# Patient Record
Sex: FEMALE | Race: BLACK OR AFRICAN AMERICAN | Employment: FULL TIME | URBAN - METROPOLITAN AREA
[De-identification: names, ages, dates, MRNs, and addresses within clinical notes are randomized per-mention and may not be internally consistent; named-entity substitution may affect disease eponyms.]

---

## 2017-02-03 ENCOUNTER — LAB ENCOUNTER (OUTPATIENT)
Dept: LAB | Age: 47
End: 2017-02-03
Attending: FAMILY MEDICINE
Payer: COMMERCIAL

## 2017-02-03 ENCOUNTER — OFFICE VISIT (OUTPATIENT)
Dept: FAMILY MEDICINE CLINIC | Facility: CLINIC | Age: 47
End: 2017-02-03

## 2017-02-03 VITALS
TEMPERATURE: 98 F | BODY MASS INDEX: 33.65 KG/M2 | WEIGHT: 222 LBS | HEIGHT: 68 IN | HEART RATE: 71 BPM | SYSTOLIC BLOOD PRESSURE: 130 MMHG | DIASTOLIC BLOOD PRESSURE: 80 MMHG

## 2017-02-03 DIAGNOSIS — J30.2 SEASONAL ALLERGIC RHINITIS, UNSPECIFIED ALLERGIC RHINITIS TRIGGER: ICD-10-CM

## 2017-02-03 DIAGNOSIS — Z00.00 ROUTINE GENERAL MEDICAL EXAMINATION AT A HEALTH CARE FACILITY: Primary | ICD-10-CM

## 2017-02-03 DIAGNOSIS — Z00.00 ROUTINE GENERAL MEDICAL EXAMINATION AT A HEALTH CARE FACILITY: ICD-10-CM

## 2017-02-03 LAB
ALBUMIN SERPL BCP-MCNC: 4 G/DL (ref 3.5–4.8)
ALBUMIN/GLOB SERPL: 1.1 {RATIO} (ref 1–2)
ALP SERPL-CCNC: 52 U/L (ref 32–100)
ALT SERPL-CCNC: 14 U/L (ref 14–54)
ANION GAP SERPL CALC-SCNC: 8 MMOL/L (ref 0–18)
AST SERPL-CCNC: 17 U/L (ref 15–41)
BASOPHILS # BLD: 0 K/UL (ref 0–0.2)
BASOPHILS NFR BLD: 1 %
BILIRUB SERPL-MCNC: 0.5 MG/DL (ref 0.3–1.2)
BUN SERPL-MCNC: 10 MG/DL (ref 8–20)
BUN/CREAT SERPL: 14.1 (ref 10–20)
CALCIUM SERPL-MCNC: 9 MG/DL (ref 8.5–10.5)
CHLORIDE SERPL-SCNC: 103 MMOL/L (ref 95–110)
CHOLEST SERPL-MCNC: 227 MG/DL (ref 110–200)
CO2 SERPL-SCNC: 26 MMOL/L (ref 22–32)
CREAT SERPL-MCNC: 0.71 MG/DL (ref 0.5–1.5)
EOSINOPHIL # BLD: 0.2 K/UL (ref 0–0.7)
EOSINOPHIL NFR BLD: 2 %
ERYTHROCYTE [DISTWIDTH] IN BLOOD BY AUTOMATED COUNT: 13.5 % (ref 11–15)
GLOBULIN PLAS-MCNC: 3.6 G/DL (ref 2.5–3.7)
GLUCOSE SERPL-MCNC: 88 MG/DL (ref 70–99)
HCT VFR BLD AUTO: 39.2 % (ref 35–48)
HDLC SERPL-MCNC: 75 MG/DL
HGB BLD-MCNC: 13 G/DL (ref 12–16)
LDLC SERPL CALC-MCNC: 140 MG/DL (ref 0–99)
LYMPHOCYTES # BLD: 2.8 K/UL (ref 1–4)
LYMPHOCYTES NFR BLD: 30 %
MCH RBC QN AUTO: 31.1 PG (ref 27–32)
MCHC RBC AUTO-ENTMCNC: 33.2 G/DL (ref 32–37)
MCV RBC AUTO: 93.7 FL (ref 80–100)
MONOCYTES # BLD: 0.5 K/UL (ref 0–1)
MONOCYTES NFR BLD: 5 %
NEUTROPHILS # BLD AUTO: 5.6 K/UL (ref 1.8–7.7)
NEUTROPHILS NFR BLD: 62 %
NONHDLC SERPL-MCNC: 152 MG/DL
OSMOLALITY UR CALC.SUM OF ELEC: 282 MOSM/KG (ref 275–295)
PLATELET # BLD AUTO: 315 K/UL (ref 140–400)
PMV BLD AUTO: 8.9 FL (ref 7.4–10.3)
POTASSIUM SERPL-SCNC: 3.6 MMOL/L (ref 3.3–5.1)
PROT SERPL-MCNC: 7.6 G/DL (ref 5.9–8.4)
RBC # BLD AUTO: 4.19 M/UL (ref 3.7–5.4)
SODIUM SERPL-SCNC: 137 MMOL/L (ref 136–144)
TRIGL SERPL-MCNC: 60 MG/DL (ref 1–149)
TSH SERPL-ACNC: 1.26 UIU/ML (ref 0.34–5.6)
WBC # BLD AUTO: 9.1 K/UL (ref 4–11)

## 2017-02-03 PROCEDURE — 99396 PREV VISIT EST AGE 40-64: CPT | Performed by: FAMILY MEDICINE

## 2017-02-03 PROCEDURE — 36415 COLL VENOUS BLD VENIPUNCTURE: CPT

## 2017-02-03 PROCEDURE — 80061 LIPID PANEL: CPT

## 2017-02-03 PROCEDURE — 80053 COMPREHEN METABOLIC PANEL: CPT

## 2017-02-03 PROCEDURE — 84443 ASSAY THYROID STIM HORMONE: CPT

## 2017-02-03 PROCEDURE — 85025 COMPLETE CBC W/AUTO DIFF WBC: CPT

## 2017-02-03 NOTE — PROGRESS NOTES
HPI:    Patient ID: Siddhartha Eli is a 42year old female. HPI  Patient presents with:  Routine Physical    Review of Systems   Constitutional: Negative. HENT: Negative. Eyes: Negative. Respiratory: Negative. Cardiovascular: Negative.     G tenderness. There is no CVA tenderness. Musculoskeletal:        Lumbar back: Normal.   Lymphadenopathy:     She has no cervical adenopathy. Neurological: She is alert and oriented to person, place, and time. She has normal strength and normal reflexes.

## 2017-05-16 NOTE — TELEPHONE ENCOUNTER
Call reviewed and noted. Patient last seen in June 2016. Potassium refilled ×1.   Patient will need yearly follow-up in June 2017

## 2017-05-16 NOTE — TELEPHONE ENCOUNTER
Dr. Mirtha Garcia,  Pt.  Requesting:   Olopatadine HCl (PAZEO) 0.7 % Ophthalmic Solution 1 Bottle 0 6/13/2016      Sig :  Apply 1 drop to eye daily.       Route:   Ophthalmic         LOV: 7/11/16  F/U appt: 1 yr  Last filled; 6//13/16       Please advise on refill

## 2017-05-16 NOTE — TELEPHONE ENCOUNTER
From: Faith Mcguire  To: Hector Diggs MD  Sent: 5/16/2017 12:09 PM CDT  Subject: Medication Renewal Request    Original authorizing provider: MD Faith Garcia would like a refill of the following medications:  Olopatadine HCl

## 2017-05-16 NOTE — TELEPHONE ENCOUNTER
Dr. Mitesh Donahue, please review medication sent ( Pazeo eye drops) and medication approved below (potassium).

## 2017-05-17 ENCOUNTER — APPOINTMENT (OUTPATIENT)
Dept: LAB | Facility: HOSPITAL | Age: 47
End: 2017-05-17
Attending: OBSTETRICS & GYNECOLOGY
Payer: COMMERCIAL

## 2017-05-17 ENCOUNTER — OFFICE VISIT (OUTPATIENT)
Dept: OBGYN CLINIC | Facility: CLINIC | Age: 47
End: 2017-05-17

## 2017-05-17 VITALS
DIASTOLIC BLOOD PRESSURE: 89 MMHG | HEART RATE: 103 BPM | HEIGHT: 67 IN | WEIGHT: 212 LBS | BODY MASS INDEX: 33.27 KG/M2 | SYSTOLIC BLOOD PRESSURE: 130 MMHG

## 2017-05-17 DIAGNOSIS — Z12.31 VISIT FOR SCREENING MAMMOGRAM: ICD-10-CM

## 2017-05-17 DIAGNOSIS — Z11.3 SCREEN FOR STD (SEXUALLY TRANSMITTED DISEASE): ICD-10-CM

## 2017-05-17 DIAGNOSIS — Z01.419 ENCOUNTER FOR GYNECOLOGICAL EXAMINATION WITHOUT ABNORMAL FINDING: Primary | ICD-10-CM

## 2017-05-17 PROCEDURE — 86803 HEPATITIS C AB TEST: CPT

## 2017-05-17 PROCEDURE — 86780 TREPONEMA PALLIDUM: CPT

## 2017-05-17 PROCEDURE — 99396 PREV VISIT EST AGE 40-64: CPT | Performed by: OBSTETRICS & GYNECOLOGY

## 2017-05-17 PROCEDURE — 87340 HEPATITIS B SURFACE AG IA: CPT

## 2017-05-17 PROCEDURE — 87389 HIV-1 AG W/HIV-1&-2 AB AG IA: CPT

## 2017-05-17 PROCEDURE — 36415 COLL VENOUS BLD VENIPUNCTURE: CPT

## 2017-05-17 NOTE — PROGRESS NOTES
Luna Peralta is a 42year old female [id number] Patient's last menstrual period was 2017. Patient presents with:  Gyn Exam: ANNUAL EXAM  STD: wishes for full testing  .     OBSTETRICS HISTORY:  OB History    Para Term  AB SAB TAB Ectopic cup daily     Social History Narrative       FAMILY HISTORY:  Family History   Problem Relation Age of Onset   • Heart Disease Father      CAD   • Diabetes Mother    • Allergies Son    • Cancer Mother      lung-smoker       MEDICATIONS:    Current outpatie affect    Pelvic Exam:  External Genitalia: normal appearance, hair distribution, and no lesions  Urethral Meatus:  normal in size, location, without lesions and prolapse  Bladder:  No fullness, masses or tenderness  Vagina:  Normal appearance without lesi

## 2017-05-22 ENCOUNTER — PATIENT MESSAGE (OUTPATIENT)
Dept: OBGYN CLINIC | Facility: CLINIC | Age: 47
End: 2017-05-22

## 2017-05-22 NOTE — TELEPHONE ENCOUNTER
From: Luis Lawson  To: Malia Owens MD  Sent: 5/22/2017 11:46 AM CDT  Subject: Test Results Question    HI, I am just resquesting my test results from my pap smear and std screening on 03/18/17

## 2017-06-13 ENCOUNTER — OFFICE VISIT (OUTPATIENT)
Dept: ALLERGY | Facility: CLINIC | Age: 47
End: 2017-06-13

## 2017-06-13 VITALS
HEART RATE: 86 BPM | WEIGHT: 210.38 LBS | TEMPERATURE: 99 F | HEIGHT: 67 IN | BODY MASS INDEX: 33.02 KG/M2 | RESPIRATION RATE: 18 BRPM | SYSTOLIC BLOOD PRESSURE: 122 MMHG | DIASTOLIC BLOOD PRESSURE: 80 MMHG

## 2017-06-13 DIAGNOSIS — J30.1 SEASONAL ALLERGIC RHINITIS DUE TO POLLEN: Primary | ICD-10-CM

## 2017-06-13 DIAGNOSIS — H10.13 ALLERGIC CONJUNCTIVITIS, BILATERAL: ICD-10-CM

## 2017-06-13 DIAGNOSIS — J30.89 ENVIRONMENTAL AND SEASONAL ALLERGIES: ICD-10-CM

## 2017-06-13 PROCEDURE — 99212 OFFICE O/P EST SF 10 MIN: CPT | Performed by: ALLERGY & IMMUNOLOGY

## 2017-06-13 PROCEDURE — 99214 OFFICE O/P EST MOD 30 MIN: CPT | Performed by: ALLERGY & IMMUNOLOGY

## 2017-06-13 RX ORDER — MONTELUKAST SODIUM 10 MG/1
10 TABLET ORAL NIGHTLY
Qty: 30 TABLET | Refills: 5 | Status: SHIPPED | OUTPATIENT
Start: 2017-06-13 | End: 2018-05-10 | Stop reason: ALTCHOICE

## 2017-06-13 RX ORDER — LEVOCETIRIZINE DIHYDROCHLORIDE 5 MG/1
5 TABLET, FILM COATED ORAL NIGHTLY
Qty: 30 TABLET | Refills: 10 | Status: SHIPPED | OUTPATIENT
Start: 2017-06-13 | End: 2018-05-22

## 2017-06-13 NOTE — PROGRESS NOTES
Ana María Rizvi is a 42year old female. HPI:   Patient presents with: Allergies: f/u has had to take xyzal twice to be effective. Patient is a 51-year-old female who presents for follow-up with a chief complaint of allergies.     Patient last seen b visit):    Current Outpatient Prescriptions:  Levocetirizine Dihydrochloride (XYZAL) 5 MG Oral Tab Take 1 tablet (5 mg total) by mouth nightly. Disp: 30 tablet Rfl: 10   Olopatadine HCl (PAZEO) 0.7 % Ophthalmic Solution Apply 1 drop to eye daily.  Disp: 1 B conjunctivitis, bilateral    Recent increase in symptoms over the past month including ocular symptoms itchy eyes watery eyes and itchy throat.   Patient currently using Xyzal 5 mg twice daily and pazeo eyedrops    Prior skin testing to environmental allerg

## 2017-06-14 ENCOUNTER — HOSPITAL ENCOUNTER (OUTPATIENT)
Dept: MAMMOGRAPHY | Age: 47
Discharge: HOME | End: 2017-06-14
Attending: OBSTETRICS & GYNECOLOGY
Payer: COMMERCIAL

## 2017-06-14 DIAGNOSIS — Z12.31 VISIT FOR SCREENING MAMMOGRAM: ICD-10-CM

## 2017-06-14 PROCEDURE — 77063 BREAST TOMOSYNTHESIS BI: CPT | Performed by: OBSTETRICS & GYNECOLOGY

## 2017-06-14 PROCEDURE — 77067 SCR MAMMO BI INCL CAD: CPT | Performed by: OBSTETRICS & GYNECOLOGY

## 2017-06-16 ENCOUNTER — PATIENT MESSAGE (OUTPATIENT)
Dept: OBGYN CLINIC | Facility: CLINIC | Age: 47
End: 2017-06-16

## 2017-06-16 NOTE — TELEPHONE ENCOUNTER
From: Theron Clarke  To: Juan Carlos Pool MD  Sent: 6/16/2017 11:22 AM CDT  Subject: Test Results Question    I would like for the results of my std and HIV screening to be mailed or emailed to me if possible.    Thank you my email address is [email or web address]

## 2018-05-10 ENCOUNTER — OFFICE VISIT (OUTPATIENT)
Dept: FAMILY MEDICINE CLINIC | Facility: CLINIC | Age: 48
End: 2018-05-10

## 2018-05-10 VITALS
BODY MASS INDEX: 32.8 KG/M2 | OXYGEN SATURATION: 98 % | SYSTOLIC BLOOD PRESSURE: 128 MMHG | HEART RATE: 76 BPM | WEIGHT: 209 LBS | DIASTOLIC BLOOD PRESSURE: 88 MMHG | RESPIRATION RATE: 17 BRPM | HEIGHT: 67 IN

## 2018-05-10 DIAGNOSIS — Z01.89 ENCOUNTER FOR ROUTINE LABORATORY TESTING: ICD-10-CM

## 2018-05-10 DIAGNOSIS — E66.09 NON MORBID OBESITY DUE TO EXCESS CALORIES: Primary | ICD-10-CM

## 2018-05-10 PROCEDURE — 99202 OFFICE O/P NEW SF 15 MIN: CPT

## 2018-05-11 NOTE — PROGRESS NOTES
HPI:    Patient ID: Luis Lawson is a 41year old female. 40 y/o female here today to establish care. She denies any acute complaints at this time, but would like to try something to help her lose some weight. No other complaints at this time.       O Solution Apply 1 drop to eye daily.  Disp: 1 Bottle Rfl: 5     Allergies:  Iodine Solution  [P*        Comment:Other reaction(s): IODINE  Shellfish                   Comment:Other reaction(s): Hives   PHYSICAL EXAM:   Physical Exam   Constitutional: She is

## 2018-05-14 ENCOUNTER — APPOINTMENT (OUTPATIENT)
Dept: LAB | Facility: HOSPITAL | Age: 48
End: 2018-05-14
Payer: COMMERCIAL

## 2018-05-14 DIAGNOSIS — E66.09 NON MORBID OBESITY DUE TO EXCESS CALORIES: ICD-10-CM

## 2018-05-14 DIAGNOSIS — Z01.89 ENCOUNTER FOR ROUTINE LABORATORY TESTING: ICD-10-CM

## 2018-05-14 PROCEDURE — 80061 LIPID PANEL: CPT

## 2018-05-14 PROCEDURE — 36415 COLL VENOUS BLD VENIPUNCTURE: CPT

## 2018-05-14 PROCEDURE — 80053 COMPREHEN METABOLIC PANEL: CPT

## 2018-05-14 PROCEDURE — 85027 COMPLETE CBC AUTOMATED: CPT

## 2018-05-14 PROCEDURE — 81001 URINALYSIS AUTO W/SCOPE: CPT

## 2018-05-14 PROCEDURE — 84443 ASSAY THYROID STIM HORMONE: CPT

## 2018-05-21 ENCOUNTER — OFFICE VISIT (OUTPATIENT)
Dept: OBGYN CLINIC | Facility: CLINIC | Age: 48
End: 2018-05-21

## 2018-05-21 ENCOUNTER — APPOINTMENT (OUTPATIENT)
Dept: LAB | Facility: HOSPITAL | Age: 48
End: 2018-05-21
Attending: OBSTETRICS & GYNECOLOGY
Payer: COMMERCIAL

## 2018-05-21 VITALS
WEIGHT: 210 LBS | HEART RATE: 62 BPM | SYSTOLIC BLOOD PRESSURE: 129 MMHG | BODY MASS INDEX: 33 KG/M2 | DIASTOLIC BLOOD PRESSURE: 88 MMHG

## 2018-05-21 DIAGNOSIS — Z11.3 SCREEN FOR STD (SEXUALLY TRANSMITTED DISEASE): ICD-10-CM

## 2018-05-21 DIAGNOSIS — Z12.31 VISIT FOR SCREENING MAMMOGRAM: ICD-10-CM

## 2018-05-21 DIAGNOSIS — Z01.419 ENCOUNTER FOR GYNECOLOGICAL EXAMINATION WITHOUT ABNORMAL FINDING: Primary | ICD-10-CM

## 2018-05-21 PROCEDURE — 36415 COLL VENOUS BLD VENIPUNCTURE: CPT

## 2018-05-21 PROCEDURE — 86780 TREPONEMA PALLIDUM: CPT

## 2018-05-21 PROCEDURE — 87340 HEPATITIS B SURFACE AG IA: CPT

## 2018-05-21 PROCEDURE — 87389 HIV-1 AG W/HIV-1&-2 AB AG IA: CPT

## 2018-05-21 PROCEDURE — 86803 HEPATITIS C AB TEST: CPT

## 2018-05-21 PROCEDURE — 99396 PREV VISIT EST AGE 40-64: CPT | Performed by: OBSTETRICS & GYNECOLOGY

## 2018-05-21 NOTE — PROGRESS NOTES
Faith Mcguire is a 41year old female [id number] Patient's last menstrual period was 2018 (approximate). Patient presents with:  Gyn Exam: Annual, mammogram order  STD: wishes for full screen  .     OBSTETRICS HISTORY:  OB History    Para Term P Other Topics Concern    Caffeine Concern Yes    Comment: coffee, 1/2 cup daily     Social History Narrative   None on file       FAMILY HISTORY:  Family History   Problem Relation Age of Onset   • Diabetes Mother    • Cancer Mother      lung CA   • Hea without palpable masses, tenderness, asymmetry, nipple discharge, nipple retraction or skin changes  Abdomen:   soft, nontender, nondistended, no masses  Skin/Hair:  no unusual rashes or bruises  Extremities:  no edema, no cyanosis  Psychiatric:   oriented

## 2018-05-22 ENCOUNTER — OFFICE VISIT (OUTPATIENT)
Dept: FAMILY MEDICINE CLINIC | Facility: CLINIC | Age: 48
End: 2018-05-22

## 2018-05-22 VITALS
OXYGEN SATURATION: 98 % | HEART RATE: 87 BPM | SYSTOLIC BLOOD PRESSURE: 110 MMHG | WEIGHT: 208 LBS | HEIGHT: 67 IN | DIASTOLIC BLOOD PRESSURE: 80 MMHG | BODY MASS INDEX: 32.65 KG/M2

## 2018-05-22 DIAGNOSIS — Z00.01 ENCOUNTER FOR ROUTINE ADULT HEALTH EXAMINATION WITH ABNORMAL FINDINGS: Primary | ICD-10-CM

## 2018-05-22 DIAGNOSIS — E78.00 HYPERCHOLESTEREMIA: ICD-10-CM

## 2018-05-22 DIAGNOSIS — R73.01 IMPAIRED FASTING GLUCOSE: ICD-10-CM

## 2018-05-22 DIAGNOSIS — E66.09 NON MORBID OBESITY DUE TO EXCESS CALORIES: ICD-10-CM

## 2018-05-22 PROCEDURE — 99396 PREV VISIT EST AGE 40-64: CPT

## 2018-05-22 RX ORDER — PHENTERMINE HYDROCHLORIDE 37.5 MG/1
37.5 TABLET ORAL
Qty: 30 TABLET | Refills: 0 | Status: SHIPPED | OUTPATIENT
Start: 2018-05-22 | End: 2018-06-19

## 2018-05-23 NOTE — PROGRESS NOTES
CC: Annual Physical Exam    HPI:   Jamshid Solano is a 41year old female who presents for a complete physical exam. Symptoms: denies discharge, itching, burning or dysuria, periods are regular. Patient denies any acute complaints at this time.      Neal Chaudhary tobacco: Never Used                      Alcohol use: Yes              Comment: occasionally    Occ: employed. : no. Children: 1 son.    Exercise: minimal.  Diet: watches minimally     REVIEW OF SYSTEMS:   CONSTITUTIONAL:  Denies unusual weight gain/ no apparent distress. HEENT:  Head:  Normocephalic, atraumatic Eyes: EOMI, PERRLA, no scleral icterus, conjunctivae clear bilaterally, no eye discharge Ears: TM's clear and intact bilaterally, no excess cerumen or erythema.  Nose: patent, no nasal discharg breakfast.  Dispense: 30 tablet; Refill: 0      The patient indicates understanding of these issues and agrees to the plan.   The patient is asked to return in 1 month for f/u of weight management and in 1 year for annual physical exam.

## 2018-05-24 ENCOUNTER — PATIENT MESSAGE (OUTPATIENT)
Dept: OBGYN CLINIC | Facility: CLINIC | Age: 48
End: 2018-05-24

## 2018-05-25 NOTE — TELEPHONE ENCOUNTER
From: Luis Lawson  To: Malia Owens MD  Sent: 5/24/2018 7:08 PM CDT  Subject: Test Results Question    I would like to have my STD and HIV test results mailed to me at  [address] , [address]

## 2018-06-19 ENCOUNTER — OFFICE VISIT (OUTPATIENT)
Dept: FAMILY MEDICINE CLINIC | Facility: CLINIC | Age: 48
End: 2018-06-19

## 2018-06-19 VITALS
BODY MASS INDEX: 33 KG/M2 | SYSTOLIC BLOOD PRESSURE: 122 MMHG | WEIGHT: 209 LBS | HEART RATE: 107 BPM | RESPIRATION RATE: 18 BRPM | DIASTOLIC BLOOD PRESSURE: 80 MMHG | OXYGEN SATURATION: 98 %

## 2018-06-19 DIAGNOSIS — N92.1 MENORRHAGIA WITH IRREGULAR CYCLE: ICD-10-CM

## 2018-06-19 DIAGNOSIS — E66.09 NON MORBID OBESITY DUE TO EXCESS CALORIES: Primary | ICD-10-CM

## 2018-06-19 PROBLEM — Z00.01 ENCOUNTER FOR ROUTINE ADULT HEALTH EXAMINATION WITH ABNORMAL FINDINGS: Status: RESOLVED | Noted: 2018-05-22 | Resolved: 2018-06-19

## 2018-06-19 PROCEDURE — 99213 OFFICE O/P EST LOW 20 MIN: CPT

## 2018-06-19 RX ORDER — PHENTERMINE HYDROCHLORIDE 37.5 MG/1
37.5 TABLET ORAL
Qty: 30 TABLET | Refills: 0 | Status: SHIPPED | OUTPATIENT
Start: 2018-06-19 | End: 2020-08-28

## 2018-06-19 RX ORDER — NAPROXEN 500 MG/1
500 TABLET ORAL 2 TIMES DAILY WITH MEALS
Qty: 60 TABLET | Refills: 0 | Status: SHIPPED | OUTPATIENT
Start: 2018-06-19 | End: 2020-08-28

## 2018-06-19 NOTE — PROGRESS NOTES
HPI:    Patient ID: Dinh Rowe is a 41year old female. Obesity   This is a chronic problem. Episode onset: few years ago. The problem occurs constantly. The problem has been waxing and waning.  Pertinent negatives include no abdominal pain, anorexi congestion, ear pain, rhinorrhea and sore throat. Eyes: Negative for photophobia, discharge and visual disturbance. Respiratory: Negative for cough and shortness of breath. Cardiovascular: Negative for chest pain and palpitations.    Cynthia Screws Menorrhagia with irregular cycle  Clinical course, prognosis, and treatment options of disease process discussed with pt. Rx for Naproxen given. RTC if symptoms worsen or fail to improve and in 1 month for f/u of weight management.       No orders of th

## 2018-06-19 NOTE — PATIENT INSTRUCTIONS
Heavy Menstrual Bleeding    Heavy menstrual bleeding means that your periods are heavier or longer than usual. You may soak through a pad or tampon every 1 to 3 hours on the heaviest days of your period. You may also pass large, dark clots. And your pretty · Heavier bleeding (soaking 1 pad or tampon every hour for 3 hours)  · Heavy bleeding that lasts longer than 1 week  · Fever of 100.4ºF (38ºC) or higher, or as directed by your provider  · Pain or cramping that gets worse instead of better  · Signs of anem

## 2018-07-22 DIAGNOSIS — N92.1 MENORRHAGIA WITH IRREGULAR CYCLE: ICD-10-CM

## 2018-07-23 RX ORDER — NAPROXEN 500 MG/1
TABLET ORAL
Qty: 60 TABLET | Refills: 0 | OUTPATIENT
Start: 2018-07-23

## 2018-08-29 ENCOUNTER — HOSPITAL ENCOUNTER (OUTPATIENT)
Dept: MAMMOGRAPHY | Age: 48
Discharge: HOME | End: 2018-08-29
Attending: OBSTETRICS & GYNECOLOGY
Payer: COMMERCIAL

## 2018-08-29 DIAGNOSIS — Z12.31 VISIT FOR SCREENING MAMMOGRAM: ICD-10-CM

## 2018-08-29 PROCEDURE — 77067 SCR MAMMO BI INCL CAD: CPT | Performed by: OBSTETRICS & GYNECOLOGY

## 2018-08-29 PROCEDURE — 77063 BREAST TOMOSYNTHESIS BI: CPT | Performed by: OBSTETRICS & GYNECOLOGY

## 2019-09-30 ENCOUNTER — LAB ENCOUNTER (OUTPATIENT)
Dept: LAB | Facility: HOSPITAL | Age: 49
End: 2019-09-30
Attending: OBSTETRICS & GYNECOLOGY
Payer: COMMERCIAL

## 2019-09-30 ENCOUNTER — OFFICE VISIT (OUTPATIENT)
Dept: OBGYN CLINIC | Facility: CLINIC | Age: 49
End: 2019-09-30
Payer: COMMERCIAL

## 2019-09-30 VITALS
HEIGHT: 67.5 IN | DIASTOLIC BLOOD PRESSURE: 90 MMHG | SYSTOLIC BLOOD PRESSURE: 142 MMHG | HEART RATE: 72 BPM | BODY MASS INDEX: 33.29 KG/M2 | WEIGHT: 214.63 LBS

## 2019-09-30 DIAGNOSIS — Z12.31 VISIT FOR SCREENING MAMMOGRAM: ICD-10-CM

## 2019-09-30 DIAGNOSIS — Z01.419 ENCOUNTER FOR GYNECOLOGICAL EXAMINATION WITHOUT ABNORMAL FINDING: Primary | ICD-10-CM

## 2019-09-30 DIAGNOSIS — Z12.4 SCREENING FOR MALIGNANT NEOPLASM OF CERVIX: ICD-10-CM

## 2019-09-30 DIAGNOSIS — Z11.3 SCREEN FOR STD (SEXUALLY TRANSMITTED DISEASE): ICD-10-CM

## 2019-09-30 PROCEDURE — 87389 HIV-1 AG W/HIV-1&-2 AB AG IA: CPT

## 2019-09-30 PROCEDURE — 86803 HEPATITIS C AB TEST: CPT

## 2019-09-30 PROCEDURE — 99396 PREV VISIT EST AGE 40-64: CPT | Performed by: OBSTETRICS & GYNECOLOGY

## 2019-09-30 PROCEDURE — 36415 COLL VENOUS BLD VENIPUNCTURE: CPT

## 2019-09-30 PROCEDURE — 87340 HEPATITIS B SURFACE AG IA: CPT

## 2019-09-30 PROCEDURE — 86780 TREPONEMA PALLIDUM: CPT

## 2019-09-30 NOTE — PROGRESS NOTES
Rose Puri is a 57year old female [id number] Patient's last menstrual period was 06/01/2019 (approximate).  Patient presents with:  Gyn Exam: annual exam,mammo order -- started new job as [employer] [address]: wishes for full sc insecurity:        Worry: Not on file        Inability: Not on file      Transportation needs:        Medical: Not on file        Non-medical: Not on file    Tobacco Use      Smoking status: Never Smoker      Smokeless tobacco: Never Used    Substance and Brother        MEDICATIONS:    Current Outpatient Medications:   •  Phentermine HCl 37.5 MG Oral Tab, Take 1 tablet (37.5 mg total) by mouth every morning before breakfast., Disp: 30 tablet, Rfl: 0  •  naproxen 500 MG Oral Tab, Take 1 tablet (500 mg total) without lesions and prolapse  Bladder:    no fullness, masses or tenderness  Vagina:    normal appearance without lesions, no abnormal discharge  Cervix:    normal without tenderness on motion  Uterus:    normal in size, contour, position, mobility, withou

## 2019-10-01 ENCOUNTER — HOSPITAL ENCOUNTER (OUTPATIENT)
Dept: MAMMOGRAPHY | Age: 49
Discharge: HOME | End: 2019-10-01
Attending: OBSTETRICS & GYNECOLOGY
Payer: COMMERCIAL

## 2019-10-01 DIAGNOSIS — Z12.31 VISIT FOR SCREENING MAMMOGRAM: ICD-10-CM

## 2019-10-01 PROCEDURE — 77067 SCR MAMMO BI INCL CAD: CPT | Performed by: OBSTETRICS & GYNECOLOGY

## 2019-10-01 PROCEDURE — 77063 BREAST TOMOSYNTHESIS BI: CPT | Performed by: OBSTETRICS & GYNECOLOGY

## 2020-06-09 ENCOUNTER — TELEPHONE (OUTPATIENT)
Dept: ALLERGY | Facility: CLINIC | Age: 50
End: 2020-06-09

## 2020-06-09 NOTE — TELEPHONE ENCOUNTER
Patient self scheduled her appointment as an exam instead of a consult. Appointment was rescheduled.

## 2020-06-09 NOTE — TELEPHONE ENCOUNTER
Please call patient to reschedule her appointment. Patient is currently scheduled at 11:30 AM on Saturday, June 20, 2020. Patient last seen by me on June 17, 2017 over 3 years ago. Patient scheduled appointment on my chart.   Please reschedule to a 30-mi

## 2020-06-17 ENCOUNTER — APPOINTMENT (OUTPATIENT)
Dept: LAB | Facility: HOSPITAL | Age: 50
End: 2020-06-17
Attending: INTERNAL MEDICINE
Payer: COMMERCIAL

## 2020-06-17 ENCOUNTER — HOSPITAL ENCOUNTER (OUTPATIENT)
Dept: GENERAL RADIOLOGY | Facility: HOSPITAL | Age: 50
Discharge: HOME | End: 2020-06-17
Attending: INTERNAL MEDICINE
Payer: COMMERCIAL

## 2020-06-17 ENCOUNTER — OFFICE VISIT (OUTPATIENT)
Dept: RHEUMATOLOGY | Facility: CLINIC | Age: 50
End: 2020-06-17
Payer: COMMERCIAL

## 2020-06-17 VITALS
SYSTOLIC BLOOD PRESSURE: 130 MMHG | HEIGHT: 67.5 IN | BODY MASS INDEX: 33.82 KG/M2 | HEART RATE: 86 BPM | WEIGHT: 218 LBS | DIASTOLIC BLOOD PRESSURE: 87 MMHG

## 2020-06-17 DIAGNOSIS — M25.562 PAIN IN LATERAL PORTION OF LEFT KNEE: ICD-10-CM

## 2020-06-17 DIAGNOSIS — M25.562 PAIN IN LATERAL PORTION OF LEFT KNEE: Primary | ICD-10-CM

## 2020-06-17 PROCEDURE — 73560 X-RAY EXAM OF KNEE 1 OR 2: CPT | Performed by: INTERNAL MEDICINE

## 2020-06-17 PROCEDURE — 85652 RBC SED RATE AUTOMATED: CPT | Performed by: INTERNAL MEDICINE

## 2020-06-17 PROCEDURE — 36415 COLL VENOUS BLD VENIPUNCTURE: CPT | Performed by: INTERNAL MEDICINE

## 2020-06-17 PROCEDURE — 86431 RHEUMATOID FACTOR QUANT: CPT | Performed by: INTERNAL MEDICINE

## 2020-06-17 PROCEDURE — 86140 C-REACTIVE PROTEIN: CPT | Performed by: INTERNAL MEDICINE

## 2020-06-17 PROCEDURE — 86200 CCP ANTIBODY: CPT | Performed by: INTERNAL MEDICINE

## 2020-06-17 PROCEDURE — 99244 OFF/OP CNSLTJ NEW/EST MOD 40: CPT | Performed by: INTERNAL MEDICINE

## 2020-06-17 NOTE — PROGRESS NOTES
Terence Gomez is a 54year old female who presents for Patient presents with:  Consult  Knee Pain: left knee swelling, \"popping\" sound,stiffness  .    HPI:   CC: L knee pain  Consult: referred by PCP Dr. Kira Booker    This is a 55 yo F with no medical conditi • Diabetes Mother    • Cancer Mother         lung CA   • Heart Disease Father    • Allergies Son    • No Known Problems Sister    • No Known Problems Brother    • No Known Problems Brother    • No Known Problems Brother       Social History:  Social Hist roll, no lateral hip pain, CHRIS test negative b/l  Knees: L knee no swelling, +FROM, nonTTP along joint line  Ankles: FROM, no pain or swelling or warmth on palpation  Feet: no pain with MTP squeeze, no toe swelling or pain or warmth on palpation with [facility]

## 2020-06-17 NOTE — PATIENT INSTRUCTIONS
You were seen today for L knee pain  Lets get some blood work  Lets get XR of knees  Try Tylenol arthritis 650 can take 3 times a day if needed  Will let you know the results

## 2020-06-19 ENCOUNTER — TELEPHONE (OUTPATIENT)
Dept: RHEUMATOLOGY | Facility: CLINIC | Age: 50
End: 2020-06-19

## 2020-06-19 NOTE — TELEPHONE ENCOUNTER
Pt added to schedule. Screen Tonic message reminder sent to pt.      Future Appointments   Date Time Provider Lacy Puentes   6/22/2020  3:30 PM Christiano Wilson MD [facility]

## 2020-08-28 ENCOUNTER — OFFICE VISIT (OUTPATIENT)
Dept: FAMILY MEDICINE CLINIC | Facility: CLINIC | Age: 50
End: 2020-08-28
Payer: COMMERCIAL

## 2020-08-28 VITALS
BODY MASS INDEX: 33.97 KG/M2 | DIASTOLIC BLOOD PRESSURE: 86 MMHG | WEIGHT: 219 LBS | HEART RATE: 91 BPM | SYSTOLIC BLOOD PRESSURE: 137 MMHG | HEIGHT: 67.5 IN

## 2020-08-28 DIAGNOSIS — F32.89 OTHER DEPRESSION: ICD-10-CM

## 2020-08-28 DIAGNOSIS — D17.0 LIPOMA OF NECK: ICD-10-CM

## 2020-08-28 DIAGNOSIS — Z00.00 ROUTINE GENERAL MEDICAL EXAMINATION AT A HEALTH CARE FACILITY: Primary | ICD-10-CM

## 2020-08-28 PROCEDURE — 3075F SYST BP GE 130 - 139MM HG: CPT | Performed by: FAMILY MEDICINE

## 2020-08-28 PROCEDURE — 3008F BODY MASS INDEX DOCD: CPT | Performed by: FAMILY MEDICINE

## 2020-08-28 PROCEDURE — 3079F DIAST BP 80-89 MM HG: CPT | Performed by: FAMILY MEDICINE

## 2020-08-28 PROCEDURE — 99396 PREV VISIT EST AGE 40-64: CPT | Performed by: FAMILY MEDICINE

## 2020-08-28 RX ORDER — ZOLPIDEM TARTRATE 10 MG/1
10 TABLET ORAL NIGHTLY PRN
Qty: 30 TABLET | Refills: 0 | Status: SHIPPED | OUTPATIENT
Start: 2020-08-28 | End: 2020-10-02

## 2020-08-28 RX ORDER — MOXIFLOXACIN 5 MG/ML
SOLUTION/ DROPS OPHTHALMIC
COMMUNITY
Start: 2020-04-10 | End: 2020-08-28

## 2020-08-28 NOTE — PROGRESS NOTES
HPI:    Patient ID: Gloria Bauer is a 54year old female. HPI  Patient presents with:  Physical: annual physical     Review of Systems   Constitutional: Negative. HENT: Negative. Eyes: Negative. Respiratory: Negative.     Cardiovascular: Nega There is no tenderness. There is no CVA tenderness. Musculoskeletal:      Lumbar back: Normal.   Lymphadenopathy:     She has no cervical adenopathy. Neurological: She is alert and oriented to person, place, and time.  She has normal strength and normal

## 2020-08-31 ENCOUNTER — LAB ENCOUNTER (OUTPATIENT)
Dept: LAB | Age: 50
End: 2020-08-31
Attending: FAMILY MEDICINE
Payer: COMMERCIAL

## 2020-08-31 DIAGNOSIS — Z00.00 ROUTINE GENERAL MEDICAL EXAMINATION AT A HEALTH CARE FACILITY: ICD-10-CM

## 2020-08-31 LAB
ALBUMIN SERPL-MCNC: 4 G/DL (ref 3.4–5)
ALBUMIN/GLOB SERPL: 0.9 {RATIO} (ref 1–2)
ALP LIVER SERPL-CCNC: 60 U/L (ref 39–100)
ALT SERPL-CCNC: 25 U/L (ref 13–56)
ANION GAP SERPL CALC-SCNC: 8 MMOL/L (ref 0–18)
AST SERPL-CCNC: 14 U/L (ref 15–37)
BASOPHILS # BLD AUTO: 0.02 X10(3) UL (ref 0–0.2)
BASOPHILS NFR BLD AUTO: 0.3 %
BILIRUB SERPL-MCNC: 0.4 MG/DL (ref 0.1–2)
BUN BLD-MCNC: 12 MG/DL (ref 7–18)
BUN/CREAT SERPL: 13.2 (ref 10–20)
CALCIUM BLD-MCNC: 9.4 MG/DL (ref 8.5–10.1)
CHLORIDE SERPL-SCNC: 105 MMOL/L (ref 98–112)
CHOLEST SMN-MCNC: 209 MG/DL (ref ?–200)
CO2 SERPL-SCNC: 26 MMOL/L (ref 21–32)
CREAT BLD-MCNC: 0.91 MG/DL (ref 0.55–1.02)
DEPRECATED RDW RBC AUTO: 41.1 FL (ref 35.1–46.3)
EOSINOPHIL # BLD AUTO: 0.09 X10(3) UL (ref 0–0.7)
EOSINOPHIL NFR BLD AUTO: 1.4 %
ERYTHROCYTE [DISTWIDTH] IN BLOOD BY AUTOMATED COUNT: 12 % (ref 11–15)
GLOBULIN PLAS-MCNC: 4.7 G/DL (ref 2.8–4.4)
GLUCOSE BLD-MCNC: 92 MG/DL (ref 70–99)
HCT VFR BLD AUTO: 37.4 % (ref 35–48)
HDLC SERPL-MCNC: 66 MG/DL (ref 40–59)
HGB BLD-MCNC: 12.4 G/DL (ref 12–16)
IMM GRANULOCYTES # BLD AUTO: 0.02 X10(3) UL (ref 0–1)
IMM GRANULOCYTES NFR BLD: 0.3 %
LDLC SERPL CALC-MCNC: 125 MG/DL (ref ?–100)
LYMPHOCYTES # BLD AUTO: 1.78 X10(3) UL (ref 1–4)
LYMPHOCYTES NFR BLD AUTO: 28.2 %
M PROTEIN MFR SERPL ELPH: 8.7 G/DL (ref 6.4–8.2)
MCH RBC QN AUTO: 30.9 PG (ref 26–34)
MCHC RBC AUTO-ENTMCNC: 33.2 G/DL (ref 31–37)
MCV RBC AUTO: 93.3 FL (ref 80–100)
MONOCYTES # BLD AUTO: 0.45 X10(3) UL (ref 0.1–1)
MONOCYTES NFR BLD AUTO: 7.1 %
NEUTROPHILS # BLD AUTO: 3.95 X10 (3) UL (ref 1.5–7.7)
NEUTROPHILS # BLD AUTO: 3.95 X10(3) UL (ref 1.5–7.7)
NEUTROPHILS NFR BLD AUTO: 62.7 %
NONHDLC SERPL-MCNC: 143 MG/DL (ref ?–130)
OSMOLALITY SERPL CALC.SUM OF ELEC: 287 MOSM/KG (ref 275–295)
PATIENT FASTING Y/N/NP: YES
PATIENT FASTING Y/N/NP: YES
PLATELET # BLD AUTO: 267 10(3)UL (ref 150–450)
POTASSIUM SERPL-SCNC: 3.7 MMOL/L (ref 3.5–5.1)
RBC # BLD AUTO: 4.01 X10(6)UL (ref 3.8–5.3)
SODIUM SERPL-SCNC: 139 MMOL/L (ref 136–145)
TRIGL SERPL-MCNC: 90 MG/DL (ref 30–149)
TSI SER-ACNC: 1.4 MIU/ML (ref 0.36–3.74)
VLDLC SERPL CALC-MCNC: 18 MG/DL (ref 0–30)
WBC # BLD AUTO: 6.3 X10(3) UL (ref 4–11)

## 2020-08-31 PROCEDURE — 36415 COLL VENOUS BLD VENIPUNCTURE: CPT

## 2020-08-31 PROCEDURE — 85025 COMPLETE CBC W/AUTO DIFF WBC: CPT

## 2020-08-31 PROCEDURE — 80053 COMPREHEN METABOLIC PANEL: CPT

## 2020-08-31 PROCEDURE — 84443 ASSAY THYROID STIM HORMONE: CPT

## 2020-08-31 PROCEDURE — 80061 LIPID PANEL: CPT

## 2020-09-01 ENCOUNTER — OFFICE VISIT (OUTPATIENT)
Dept: SURGERY | Facility: CLINIC | Age: 50
End: 2020-09-01
Payer: COMMERCIAL

## 2020-09-01 VITALS — WEIGHT: 219 LBS | BODY MASS INDEX: 34 KG/M2

## 2020-09-01 DIAGNOSIS — D17.1 LIPOMA OF TORSO: Primary | ICD-10-CM

## 2020-09-01 PROCEDURE — 99203 OFFICE O/P NEW LOW 30 MIN: CPT | Performed by: SURGERY
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
## 2020-09-21 ENCOUNTER — APPOINTMENT (OUTPATIENT)
Dept: LAB | Facility: HOSPITAL | Age: 50
End: 2020-09-21
Attending: SURGERY
Payer: COMMERCIAL

## 2020-09-21 DIAGNOSIS — Z01.818 PRE-OP TESTING: ICD-10-CM

## 2020-09-22 LAB — SARS-COV-2 RNA RESP QL NAA+PROBE: NOT DETECTED

## 2020-09-23 ENCOUNTER — TELEPHONE (OUTPATIENT)
Dept: SURGERY | Facility: CLINIC | Age: 50
End: 2020-09-23

## 2020-09-23 ENCOUNTER — ANESTHESIA (OUTPATIENT)
Dept: SURGERY | Facility: HOSPITAL | Age: 50
End: 2020-09-23
Payer: COMMERCIAL

## 2020-09-23 ENCOUNTER — HOSPITAL ENCOUNTER (OUTPATIENT)
Facility: HOSPITAL | Age: 50
Setting detail: HOSPITAL OUTPATIENT SURGERY
Discharge: HOME | End: 2020-09-23
Attending: SURGERY | Admitting: SURGERY
Payer: COMMERCIAL

## 2020-09-23 ENCOUNTER — ANESTHESIA EVENT (OUTPATIENT)
Dept: SURGERY | Facility: HOSPITAL | Age: 50
End: 2020-09-23
Payer: COMMERCIAL

## 2020-09-23 VITALS
BODY MASS INDEX: 33.43 KG/M2 | DIASTOLIC BLOOD PRESSURE: 85 MMHG | OXYGEN SATURATION: 95 % | TEMPERATURE: 98 F | WEIGHT: 213 LBS | SYSTOLIC BLOOD PRESSURE: 140 MMHG | HEART RATE: 59 BPM | HEIGHT: 67 IN | RESPIRATION RATE: 15 BRPM

## 2020-09-23 DIAGNOSIS — Z01.818 PRE-OP TESTING: Primary | ICD-10-CM

## 2020-09-23 DIAGNOSIS — D17.1 LIPOMA OF TORSO: ICD-10-CM

## 2020-09-23 DIAGNOSIS — D17.30 NEVUS LIPOMATOSUS CUTANEUS SUPERFICIALIS: ICD-10-CM

## 2020-09-23 LAB — B-HCG UR QL: NEGATIVE

## 2020-09-23 PROCEDURE — 11402 EXC TR-EXT B9+MARG 1.1-2 CM: CPT | Performed by: SURGERY

## 2020-09-23 PROCEDURE — 0JQF0ZZ REPAIR LEFT UPPER ARM SUBCUTANEOUS TISSUE AND FASCIA, OPEN APPROACH: ICD-10-PCS | Performed by: SURGERY

## 2020-09-23 PROCEDURE — 12031 INTMD RPR S/A/T/EXT 2.5 CM/<: CPT | Performed by: SURGERY

## 2020-09-23 PROCEDURE — 0HBCXZZ EXCISION OF LEFT UPPER ARM SKIN, EXTERNAL APPROACH: ICD-10-PCS | Performed by: SURGERY

## 2020-09-23 RX ORDER — NALOXONE HYDROCHLORIDE 0.4 MG/ML
80 INJECTION, SOLUTION INTRAMUSCULAR; INTRAVENOUS; SUBCUTANEOUS AS NEEDED
Status: CANCELLED | OUTPATIENT
Start: 2020-09-23 | End: 2020-09-23

## 2020-09-23 RX ORDER — HYDROCODONE BITARTRATE AND ACETAMINOPHEN 5; 325 MG/1; MG/1
1 TABLET ORAL AS NEEDED
Status: CANCELLED | OUTPATIENT
Start: 2020-09-23

## 2020-09-23 RX ORDER — MIDAZOLAM HYDROCHLORIDE 1 MG/ML
INJECTION INTRAMUSCULAR; INTRAVENOUS AS NEEDED
Status: DISCONTINUED | OUTPATIENT
Start: 2020-09-23 | End: 2020-09-23 | Stop reason: SURG

## 2020-09-23 RX ORDER — METOCLOPRAMIDE 10 MG/1
10 TABLET ORAL ONCE
Status: COMPLETED | OUTPATIENT
Start: 2020-09-23 | End: 2020-09-23

## 2020-09-23 RX ORDER — ACETAMINOPHEN 500 MG
1000 TABLET ORAL ONCE
Status: COMPLETED | OUTPATIENT
Start: 2020-09-23 | End: 2020-09-23

## 2020-09-23 RX ORDER — MORPHINE SULFATE 2 MG/ML
2 INJECTION, SOLUTION INTRAMUSCULAR; INTRAVENOUS EVERY 10 MIN PRN
Status: CANCELLED | OUTPATIENT
Start: 2020-09-23

## 2020-09-23 RX ORDER — HALOPERIDOL 5 MG/ML
0.25 INJECTION INTRAMUSCULAR ONCE AS NEEDED
Status: CANCELLED | OUTPATIENT
Start: 2020-09-23 | End: 2020-09-23

## 2020-09-23 RX ORDER — FAMOTIDINE 20 MG/1
20 TABLET ORAL ONCE
Status: COMPLETED | OUTPATIENT
Start: 2020-09-23 | End: 2020-09-23

## 2020-09-23 RX ORDER — PROCHLORPERAZINE EDISYLATE 5 MG/ML
5 INJECTION INTRAMUSCULAR; INTRAVENOUS ONCE AS NEEDED
Status: CANCELLED | OUTPATIENT
Start: 2020-09-23 | End: 2020-09-23

## 2020-09-23 RX ORDER — CEFAZOLIN SODIUM/WATER 2 G/20 ML
2 SYRINGE (ML) INTRAVENOUS ONCE
Status: DISCONTINUED | OUTPATIENT
Start: 2020-09-23 | End: 2020-09-23 | Stop reason: HOSPADM

## 2020-09-23 RX ORDER — CEFAZOLIN SODIUM/WATER 2 G/20 ML
SYRINGE (ML) INTRAVENOUS AS NEEDED
Status: DISCONTINUED | OUTPATIENT
Start: 2020-09-23 | End: 2020-09-23 | Stop reason: SURG

## 2020-09-23 RX ORDER — HYDROMORPHONE HYDROCHLORIDE 1 MG/ML
0.6 INJECTION, SOLUTION INTRAMUSCULAR; INTRAVENOUS; SUBCUTANEOUS EVERY 5 MIN PRN
Status: CANCELLED | OUTPATIENT
Start: 2020-09-23

## 2020-09-23 RX ORDER — HYDROCODONE BITARTRATE AND ACETAMINOPHEN 5; 325 MG/1; MG/1
1 TABLET ORAL EVERY 4 HOURS PRN
Qty: 30 TABLET | Refills: 0 | Status: SHIPPED | OUTPATIENT
Start: 2020-09-23 | End: 2020-10-02 | Stop reason: ALTCHOICE

## 2020-09-23 RX ORDER — MORPHINE SULFATE 10 MG/ML
6 INJECTION, SOLUTION INTRAMUSCULAR; INTRAVENOUS EVERY 10 MIN PRN
Status: CANCELLED | OUTPATIENT
Start: 2020-09-23

## 2020-09-23 RX ORDER — HYDROMORPHONE HYDROCHLORIDE 1 MG/ML
0.2 INJECTION, SOLUTION INTRAMUSCULAR; INTRAVENOUS; SUBCUTANEOUS EVERY 5 MIN PRN
Status: CANCELLED | OUTPATIENT
Start: 2020-09-23

## 2020-09-23 RX ORDER — SODIUM CHLORIDE, SODIUM LACTATE, POTASSIUM CHLORIDE, CALCIUM CHLORIDE 600; 310; 30; 20 MG/100ML; MG/100ML; MG/100ML; MG/100ML
INJECTION, SOLUTION INTRAVENOUS CONTINUOUS
Status: DISCONTINUED | OUTPATIENT
Start: 2020-09-23 | End: 2020-09-23

## 2020-09-23 RX ORDER — DEXAMETHASONE SODIUM PHOSPHATE 4 MG/ML
VIAL (ML) INJECTION AS NEEDED
Status: DISCONTINUED | OUTPATIENT
Start: 2020-09-23 | End: 2020-09-23 | Stop reason: SURG

## 2020-09-23 RX ORDER — POLYETHYLENE GLYCOL 3350 17 G/17G
17 POWDER, FOR SOLUTION ORAL DAILY
Qty: 14 PACKET | Refills: 0 | Status: SHIPPED | OUTPATIENT
Start: 2020-09-23 | End: 2020-10-02 | Stop reason: ALTCHOICE

## 2020-09-23 RX ORDER — SODIUM CHLORIDE, SODIUM LACTATE, POTASSIUM CHLORIDE, CALCIUM CHLORIDE 600; 310; 30; 20 MG/100ML; MG/100ML; MG/100ML; MG/100ML
INJECTION, SOLUTION INTRAVENOUS CONTINUOUS
Status: CANCELLED | OUTPATIENT
Start: 2020-09-23

## 2020-09-23 RX ORDER — HYDROCODONE BITARTRATE AND ACETAMINOPHEN 5; 325 MG/1; MG/1
2 TABLET ORAL AS NEEDED
Status: CANCELLED | OUTPATIENT
Start: 2020-09-23

## 2020-09-23 RX ORDER — HYDROMORPHONE HYDROCHLORIDE 1 MG/ML
0.4 INJECTION, SOLUTION INTRAMUSCULAR; INTRAVENOUS; SUBCUTANEOUS EVERY 5 MIN PRN
Status: CANCELLED | OUTPATIENT
Start: 2020-09-23

## 2020-09-23 RX ORDER — ONDANSETRON 2 MG/ML
4 INJECTION INTRAMUSCULAR; INTRAVENOUS ONCE AS NEEDED
Status: CANCELLED | OUTPATIENT
Start: 2020-09-23 | End: 2020-09-23

## 2020-09-23 RX ORDER — BUPIVACAINE HYDROCHLORIDE AND EPINEPHRINE 5; 5 MG/ML; UG/ML
INJECTION, SOLUTION PERINEURAL AS NEEDED
Status: DISCONTINUED | OUTPATIENT
Start: 2020-09-23 | End: 2020-09-23 | Stop reason: HOSPADM

## 2020-09-23 RX ORDER — LIDOCAINE HYDROCHLORIDE 10 MG/ML
INJECTION, SOLUTION EPIDURAL; INFILTRATION; INTRACAUDAL; PERINEURAL AS NEEDED
Status: DISCONTINUED | OUTPATIENT
Start: 2020-09-23 | End: 2020-09-23 | Stop reason: SURG

## 2020-09-23 RX ORDER — MORPHINE SULFATE 4 MG/ML
4 INJECTION, SOLUTION INTRAMUSCULAR; INTRAVENOUS EVERY 10 MIN PRN
Status: CANCELLED | OUTPATIENT
Start: 2020-09-23

## 2020-09-23 RX ORDER — ONDANSETRON 2 MG/ML
INJECTION INTRAMUSCULAR; INTRAVENOUS AS NEEDED
Status: DISCONTINUED | OUTPATIENT
Start: 2020-09-23 | End: 2020-09-23 | Stop reason: SURG

## 2020-09-23 RX ADMIN — DEXAMETHASONE SODIUM PHOSPHATE 4 MG: 4 MG/ML VIAL (ML) INJECTION at 11:46:00

## 2020-09-23 RX ADMIN — SODIUM CHLORIDE, SODIUM LACTATE, POTASSIUM CHLORIDE, CALCIUM CHLORIDE: 600; 310; 30; 20 INJECTION, SOLUTION INTRAVENOUS at 12:12:00

## 2020-09-23 RX ADMIN — LIDOCAINE HYDROCHLORIDE 50 MG: 10 INJECTION, SOLUTION EPIDURAL; INFILTRATION; INTRACAUDAL; PERINEURAL at 11:45:00

## 2020-09-23 RX ADMIN — MIDAZOLAM HYDROCHLORIDE 2 MG: 1 INJECTION INTRAMUSCULAR; INTRAVENOUS at 11:42:00

## 2020-09-23 RX ADMIN — ONDANSETRON 4 MG: 2 INJECTION INTRAMUSCULAR; INTRAVENOUS at 11:46:00

## 2020-09-23 RX ADMIN — CEFAZOLIN SODIUM/WATER 2 G: 2 G/20 ML SYRINGE (ML) INTRAVENOUS at 11:48:00

## 2020-09-23 NOTE — INTERVAL H&P NOTE
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

## 2020-09-23 NOTE — TELEPHONE ENCOUNTER
Per pt pharmacy has not received [facility] 5-325 MG Oral Tab.  Please advise

## 2020-09-23 NOTE — TELEPHONE ENCOUNTER
Prescription was not given to patient in PACU. Patient's son is to come and  the prescription for the patient.

## 2020-09-23 NOTE — ANESTHESIA PREPROCEDURE EVALUATION
Anesthesia PreOp Note    HPI:     Katarzyna Velez is a 54year old female who presents for preoperative consultation requested by: Meet Lambert MD    Date of Surgery: 9/23/2020    Procedure(s):  EXCISION OF LESION/LIPOMA  Indication: Nevus lipomatosus cutane Comment:Shell fish             Eyes burn,welts on body  Iodine Solution  [P*        Comment:Other reaction(s): IODINE  Shellfish                   Comment:Other reaction(s): Hives    Family History   Problem Relation Age of Onset   • Diabetes Mother    • C Asked        Blood Transfusions: Not Asked        Caffeine Concern: Yes          coffee, 1/2 cup daily        Occupational Exposure: Not Asked        Hobby Hazards: Not Asked        Sleep Concern: Not Asked        Stress Concern: Not Asked        Weight Co analgesics  Informed Consent Plan and Risks Discussed With:  Patient      I have informed Heather Garibay and/or legal guardian or family member of the nature of the anesthetic plan, benefits, risks including possible dental damage if relevant, major compl

## 2020-09-23 NOTE — OPERATIVE REPORT
Operative Report    Patient Name:  Rose Puri  MR:  [id number]  :  1970  DOS:  22    Preop Dx:  Lipoma of the left shoulder  Postop Dx:  same  Procedure:   1. Excision of lipoma left shoulder, 1.5 cm  2.  Layered closure  Surgeon:  Paddy Spurling

## 2020-09-23 NOTE — ANESTHESIA POSTPROCEDURE EVALUATION
Patient: Tory Vivar    Procedure Summary     Date: 09/23/20 Room / Location: [facility] OR  / [facility] OR    Anesthesia Start: 1224 Anesthesia Stop:     Procedure: EXCISION OF LESION/LIPOMA (Left ) Diagnosis:       Nevus lipomatosus cutaneus superfici

## 2020-09-24 ENCOUNTER — TELEPHONE (OUTPATIENT)
Dept: SURGERY | Facility: CLINIC | Age: 50
End: 2020-09-24

## 2020-09-24 ENCOUNTER — TELEPHONE (OUTPATIENT)
Dept: FAMILY MEDICINE CLINIC | Facility: CLINIC | Age: 50
End: 2020-09-24

## 2020-09-24 NOTE — TELEPHONE ENCOUNTER
Pt dropped off her Paper work to be completed, TIFFANIE was signed and placed in  TIFFANIE @ [facility] . Sent to forms.

## 2020-09-25 ENCOUNTER — TELEPHONE (OUTPATIENT)
Dept: SURGERY | Facility: CLINIC | Age: 50
End: 2020-09-25

## 2020-09-25 NOTE — TELEPHONE ENCOUNTER
Dr. Norman Snowden,    Patient needs FMLA signed to [employer] on Monday     Please sign off on form:  -Highlight the patient and hit \"Chart\" button. -In Chart Review, w/in the Encounter tab - click 1 time on the Telephone call encounter for 9/24/20 Scroll down the telephone encounter.  -Click \"scan on\" blue Hyperlink under \"Media\" heading for FMLA Dr. Norman Snowden 9/25/20 w/in the telephone enc.  -Click on Acknowledge button at the bottom right corner and left-click onto image, signature stamp appears and drag signature to Provider signature line. Stamp will turn blue. Close window.      Thank you,    Carol Arce

## 2020-09-25 NOTE — TELEPHONE ENCOUNTER
Per pt needing a note to return to be able to return to work, the dates missed were 9/22-9/24. Pt will  at .  Please advise

## 2020-10-01 ENCOUNTER — APPOINTMENT (OUTPATIENT)
Dept: CT IMAGING | Facility: HOSPITAL | Age: 50
End: 2020-10-01
Attending: EMERGENCY MEDICINE
Payer: COMMERCIAL

## 2020-10-01 ENCOUNTER — HOSPITAL ENCOUNTER (EMERGENCY)
Facility: HOSPITAL | Age: 50
Discharge: HOME | End: 2020-10-01
Attending: EMERGENCY MEDICINE
Payer: COMMERCIAL

## 2020-10-01 VITALS
TEMPERATURE: 98 F | HEART RATE: 62 BPM | RESPIRATION RATE: 20 BRPM | SYSTOLIC BLOOD PRESSURE: 141 MMHG | OXYGEN SATURATION: 98 % | HEIGHT: 67 IN | DIASTOLIC BLOOD PRESSURE: 98 MMHG | BODY MASS INDEX: 33.43 KG/M2 | WEIGHT: 213 LBS

## 2020-10-01 DIAGNOSIS — M54.50 ACUTE RIGHT-SIDED LOW BACK PAIN WITHOUT SCIATICA: Primary | ICD-10-CM

## 2020-10-01 PROCEDURE — 80048 BASIC METABOLIC PNL TOTAL CA: CPT | Performed by: EMERGENCY MEDICINE

## 2020-10-01 PROCEDURE — 85025 COMPLETE CBC W/AUTO DIFF WBC: CPT | Performed by: EMERGENCY MEDICINE

## 2020-10-01 PROCEDURE — 96374 THER/PROPH/DIAG INJ IV PUSH: CPT

## 2020-10-01 PROCEDURE — 74176 CT ABD & PELVIS W/O CONTRAST: CPT | Performed by: EMERGENCY MEDICINE

## 2020-10-01 PROCEDURE — 81025 URINE PREGNANCY TEST: CPT

## 2020-10-01 PROCEDURE — 81001 URINALYSIS AUTO W/SCOPE: CPT | Performed by: EMERGENCY MEDICINE

## 2020-10-01 PROCEDURE — 80076 HEPATIC FUNCTION PANEL: CPT | Performed by: EMERGENCY MEDICINE

## 2020-10-01 PROCEDURE — 99284 EMERGENCY DEPT VISIT MOD MDM: CPT

## 2020-10-01 RX ORDER — LIDOCAINE 50 MG/G
1 PATCH TOPICAL EVERY 24 HOURS
Qty: 7 PATCH | Refills: 0 | Status: SHIPPED | OUTPATIENT
Start: 2020-10-01 | End: 2020-10-02

## 2020-10-01 RX ORDER — IBUPROFEN 600 MG/1
600 TABLET ORAL EVERY 8 HOURS PRN
Qty: 30 TABLET | Refills: 0 | Status: SHIPPED | OUTPATIENT
Start: 2020-10-01 | End: 2020-10-08

## 2020-10-01 RX ORDER — KETOROLAC TROMETHAMINE 15 MG/ML
15 INJECTION, SOLUTION INTRAMUSCULAR; INTRAVENOUS ONCE
Status: COMPLETED | OUTPATIENT
Start: 2020-10-01 | End: 2020-10-01

## 2020-10-01 NOTE — ED NOTES
Assessment agreeable to triage note. Pt c/o right flank pain that worsens with movement since 0300a.  Pt denies any dysuria, no n/v.

## 2020-10-01 NOTE — ED PROVIDER NOTES
Patient Seen in: [facility] AND [facility] Emergency Department      History   Patient presents with:  Abdomen/Flank Pain    Stated Complaint: right flank pain    HPI    82-year-old female presenting for evaluation of right low back pain.   This started approxima Resp 20   Ht 170.2 cm (5' 7\")   Wt 96.6 kg   LMP 01/01/2020 (Approximate)   SpO2 98%   BMI 33.36 kg/m²         Physical Exam  Vitals signs and nursing note reviewed. Constitutional:       Appearance: She is well-developed.    HENT:      Head: Normoceph -----------         ------                     CBC W/ DIFFERENTIAL[[id number]]                              Final result                 Please view results for these tests on the individual orders.    RAINBOW DRAW BLUE   RAINBOW mg total) by mouth every 8 (eight) hours as needed for Pain or Fever.   Qty: 30 tablet Refills: 0

## 2020-10-02 ENCOUNTER — OFFICE VISIT (OUTPATIENT)
Dept: FAMILY MEDICINE CLINIC | Facility: CLINIC | Age: 50
End: 2020-10-02
Payer: COMMERCIAL

## 2020-10-02 VITALS
HEIGHT: 67 IN | DIASTOLIC BLOOD PRESSURE: 89 MMHG | SYSTOLIC BLOOD PRESSURE: 138 MMHG | WEIGHT: 216 LBS | BODY MASS INDEX: 33.9 KG/M2 | TEMPERATURE: 97 F | HEART RATE: 78 BPM

## 2020-10-02 DIAGNOSIS — R07.81 RIB PAIN ON RIGHT SIDE: ICD-10-CM

## 2020-10-02 DIAGNOSIS — D17.0 LIPOMA OF NECK: ICD-10-CM

## 2020-10-02 DIAGNOSIS — G47.9 SLEEP DISORDER: Primary | ICD-10-CM

## 2020-10-02 PROCEDURE — 3079F DIAST BP 80-89 MM HG: CPT | Performed by: FAMILY MEDICINE

## 2020-10-02 PROCEDURE — 3075F SYST BP GE 130 - 139MM HG: CPT | Performed by: FAMILY MEDICINE

## 2020-10-02 PROCEDURE — 3008F BODY MASS INDEX DOCD: CPT | Performed by: FAMILY MEDICINE

## 2020-10-02 PROCEDURE — 99213 OFFICE O/P EST LOW 20 MIN: CPT | Performed by: FAMILY MEDICINE

## 2020-10-02 RX ORDER — ZOLPIDEM TARTRATE 10 MG/1
10 TABLET ORAL NIGHTLY PRN
Qty: 30 TABLET | Refills: 5 | Status: SHIPPED | OUTPATIENT
Start: 2020-10-02 | End: 2021-03-30

## 2020-10-02 NOTE — PROGRESS NOTES
HPI:    Patient ID: Preethi Cavazos is a 54year old female. HPI  Not sleeping well at night. She continues to have sadness over the death of her brother. Works daily. Recently had lipoma removed form her neck.      Review of Systems   Constitutional:

## 2020-10-06 ENCOUNTER — OFFICE VISIT (OUTPATIENT)
Dept: SURGERY | Facility: CLINIC | Age: 50
End: 2020-10-06
Payer: COMMERCIAL

## 2020-10-06 VITALS — BODY MASS INDEX: 34 KG/M2 | WEIGHT: 216 LBS

## 2020-10-06 DIAGNOSIS — Z09 POSTOPERATIVE EXAMINATION: Primary | ICD-10-CM

## 2020-10-06 PROCEDURE — 99024 POSTOP FOLLOW-UP VISIT: CPT | Performed by: SURGERY

## 2020-10-06 NOTE — PROGRESS NOTES
Patient presents with:  Post-Op: S/P Excision of lesion Left shoulder 9/23/2020. Patient states there is some soreness, no pain. Denies drainage, fevers. Site appears well-healed.       O:  Wt 216 lb (98 kg)   LMP 01/01/2020 (Approximate)   BMI 33.83 kg/

## 2020-11-13 ENCOUNTER — OFFICE VISIT (OUTPATIENT)
Dept: OBGYN CLINIC | Facility: CLINIC | Age: 50
End: 2020-11-13
Payer: COMMERCIAL

## 2020-11-13 ENCOUNTER — HOSPITAL ENCOUNTER (OUTPATIENT)
Dept: MAMMOGRAPHY | Facility: HOSPITAL | Age: 50
Discharge: HOME | End: 2020-11-13
Attending: OBSTETRICS & GYNECOLOGY
Payer: COMMERCIAL

## 2020-11-13 ENCOUNTER — LAB ENCOUNTER (OUTPATIENT)
Dept: LAB | Facility: HOSPITAL | Age: 50
End: 2020-11-13
Attending: OBSTETRICS & GYNECOLOGY
Payer: COMMERCIAL

## 2020-11-13 VITALS
BODY MASS INDEX: 34 KG/M2 | DIASTOLIC BLOOD PRESSURE: 93 MMHG | HEART RATE: 61 BPM | SYSTOLIC BLOOD PRESSURE: 153 MMHG | WEIGHT: 217 LBS

## 2020-11-13 DIAGNOSIS — Z12.31 VISIT FOR SCREENING MAMMOGRAM: ICD-10-CM

## 2020-11-13 DIAGNOSIS — Z11.3 SCREEN FOR STD (SEXUALLY TRANSMITTED DISEASE): ICD-10-CM

## 2020-11-13 DIAGNOSIS — Z01.419 ENCOUNTER FOR GYNECOLOGICAL EXAMINATION WITHOUT ABNORMAL FINDING: Primary | ICD-10-CM

## 2020-11-13 PROBLEM — D17.1 LIPOMA OF TORSO: Status: RESOLVED | Noted: 2020-09-01 | Resolved: 2020-11-13

## 2020-11-13 PROCEDURE — 87591 N.GONORRHOEAE DNA AMP PROB: CPT

## 2020-11-13 PROCEDURE — 36415 COLL VENOUS BLD VENIPUNCTURE: CPT

## 2020-11-13 PROCEDURE — 87389 HIV-1 AG W/HIV-1&-2 AB AG IA: CPT

## 2020-11-13 PROCEDURE — 87491 CHLMYD TRACH DNA AMP PROBE: CPT

## 2020-11-13 PROCEDURE — 99396 PREV VISIT EST AGE 40-64: CPT | Performed by: OBSTETRICS & GYNECOLOGY

## 2020-11-13 PROCEDURE — 86803 HEPATITIS C AB TEST: CPT

## 2020-11-13 PROCEDURE — 87340 HEPATITIS B SURFACE AG IA: CPT

## 2020-11-13 PROCEDURE — 77063 BREAST TOMOSYNTHESIS BI: CPT | Performed by: OBSTETRICS & GYNECOLOGY

## 2020-11-13 PROCEDURE — 77067 SCR MAMMO BI INCL CAD: CPT | Performed by: OBSTETRICS & GYNECOLOGY

## 2020-11-13 PROCEDURE — 86780 TREPONEMA PALLIDUM: CPT

## 2020-11-13 PROCEDURE — 99072 ADDL SUPL MATRL&STAF TM PHE: CPT | Performed by: OBSTETRICS & GYNECOLOGY

## 2020-11-13 PROCEDURE — 3080F DIAST BP >= 90 MM HG: CPT | Performed by: OBSTETRICS & GYNECOLOGY

## 2020-11-13 PROCEDURE — 3077F SYST BP >= 140 MM HG: CPT | Performed by: OBSTETRICS & GYNECOLOGY

## 2020-11-13 RX ORDER — LIDOCAINE 50 MG/G
PATCH TOPICAL
COMMUNITY
Start: 2020-10-02 | End: 2020-11-13

## 2020-11-13 NOTE — PROGRESS NOTES
Dioni Plasencia is a 54year old female [id number] Patient's last menstrual period was 01/01/2020 (approximate). Patient presents with:  Gyn Exam: Annual -- had lipoma removed on left shoulder this year  Std Screen: full screening  .     OBSTETRICS HISTORY:  O file      Years of education: Not on file      Highest education level: Not on file    Occupational History      Not on file    Social Needs      Financial resource strain: Not on file      Food insecurity        Worry: Not on file        Inability: Not on Cancer Mother         lung CA   • Heart Disease Father    • Allergies Son    • No Known Problems Sister    • No Known Problems Brother    • No Known Problems Brother    • No Known Problems Brother        MEDICATIONS:    Current Outpatient Medications:   • Exam:  External Genitalia:  normal appearance, hair distribution, and no lesions  Urethral Meatus:   normal in size, location, without lesions and prolapse  Bladder:    no fullness, masses or tenderness  Vagina:    normal appearance without lesions, no abn

## 2020-11-20 ENCOUNTER — HOSPITAL ENCOUNTER (OUTPATIENT)
Dept: ULTRASOUND IMAGING | Facility: HOSPITAL | Age: 50
Discharge: HOME | End: 2020-11-20
Attending: OBSTETRICS & GYNECOLOGY
Payer: COMMERCIAL

## 2020-11-20 DIAGNOSIS — R92.8 ABNORMAL MAMMOGRAM: ICD-10-CM

## 2020-11-20 PROCEDURE — 76642 ULTRASOUND BREAST LIMITED: CPT | Performed by: OBSTETRICS & GYNECOLOGY

## 2021-04-02 RX ORDER — ZOLPIDEM TARTRATE 10 MG/1
10 TABLET ORAL NIGHTLY PRN
Qty: 30 TABLET | Refills: 5 | Status: SHIPPED | OUTPATIENT
Start: 2021-04-02 | End: 2021-09-30

## 2021-05-09 ENCOUNTER — TELEPHONE (OUTPATIENT)
Dept: FAMILY MEDICINE CLINIC | Facility: CLINIC | Age: 51
End: 2021-05-09

## 2021-05-09 NOTE — TELEPHONE ENCOUNTER
Per appointment notes, patient states she is experiencing. \"Fingers sometimes get numbness. \"    Patient scheduled herself an appointment via My Chart for tomorrow 05/10 with Dr. Sunday Fernandez.

## 2021-05-10 ENCOUNTER — OFFICE VISIT (OUTPATIENT)
Dept: FAMILY MEDICINE CLINIC | Facility: CLINIC | Age: 51
End: 2021-05-10
Payer: COMMERCIAL

## 2021-05-10 ENCOUNTER — LAB ENCOUNTER (OUTPATIENT)
Dept: LAB | Age: 51
End: 2021-05-10
Attending: FAMILY MEDICINE
Payer: COMMERCIAL

## 2021-05-10 VITALS
DIASTOLIC BLOOD PRESSURE: 94 MMHG | HEART RATE: 93 BPM | WEIGHT: 209 LBS | HEIGHT: 67 IN | BODY MASS INDEX: 32.8 KG/M2 | SYSTOLIC BLOOD PRESSURE: 156 MMHG

## 2021-05-10 DIAGNOSIS — M79.641 PAIN IN RIGHT HAND: Primary | ICD-10-CM

## 2021-05-10 DIAGNOSIS — I73.00 RAYNAUD'S DISEASE WITHOUT GANGRENE: ICD-10-CM

## 2021-05-10 DIAGNOSIS — Z00.00 ROUTINE GENERAL MEDICAL EXAMINATION AT A HEALTH CARE FACILITY: ICD-10-CM

## 2021-05-10 DIAGNOSIS — I10 ESSENTIAL HYPERTENSION: ICD-10-CM

## 2021-05-10 PROCEDURE — 84443 ASSAY THYROID STIM HORMONE: CPT

## 2021-05-10 PROCEDURE — 36415 COLL VENOUS BLD VENIPUNCTURE: CPT

## 2021-05-10 PROCEDURE — 99214 OFFICE O/P EST MOD 30 MIN: CPT | Performed by: FAMILY MEDICINE

## 2021-05-10 PROCEDURE — 3080F DIAST BP >= 90 MM HG: CPT | Performed by: FAMILY MEDICINE

## 2021-05-10 PROCEDURE — 80061 LIPID PANEL: CPT

## 2021-05-10 PROCEDURE — 80053 COMPREHEN METABOLIC PANEL: CPT

## 2021-05-10 PROCEDURE — 3008F BODY MASS INDEX DOCD: CPT | Performed by: FAMILY MEDICINE

## 2021-05-10 PROCEDURE — 85025 COMPLETE CBC W/AUTO DIFF WBC: CPT

## 2021-05-10 PROCEDURE — 3077F SYST BP >= 140 MM HG: CPT | Performed by: FAMILY MEDICINE

## 2021-05-10 NOTE — PROGRESS NOTES
HPI/Subjective:   Patient ID: Arpita Quick is a 52year old female.     HPI  Patient presents with:  Numbness: had tingling numbness on RT hand middle fing and pointing finger,  fingers then turned white at the tips, happens when cold,  denies any injuri

## 2021-06-10 ENCOUNTER — OFFICE VISIT (OUTPATIENT)
Dept: FAMILY MEDICINE CLINIC | Facility: CLINIC | Age: 51
End: 2021-06-10
Payer: COMMERCIAL

## 2021-06-10 VITALS
HEIGHT: 67 IN | DIASTOLIC BLOOD PRESSURE: 93 MMHG | WEIGHT: 213 LBS | HEART RATE: 89 BPM | BODY MASS INDEX: 33.43 KG/M2 | SYSTOLIC BLOOD PRESSURE: 159 MMHG

## 2021-06-10 DIAGNOSIS — J30.2 SEASONAL ALLERGIES: ICD-10-CM

## 2021-06-10 DIAGNOSIS — I10 ESSENTIAL HYPERTENSION: Primary | ICD-10-CM

## 2021-06-10 PROCEDURE — 3077F SYST BP >= 140 MM HG: CPT | Performed by: FAMILY MEDICINE

## 2021-06-10 PROCEDURE — 3080F DIAST BP >= 90 MM HG: CPT | Performed by: FAMILY MEDICINE

## 2021-06-10 PROCEDURE — 3008F BODY MASS INDEX DOCD: CPT | Performed by: FAMILY MEDICINE

## 2021-06-10 PROCEDURE — 99214 OFFICE O/P EST MOD 30 MIN: CPT | Performed by: FAMILY MEDICINE

## 2021-06-10 RX ORDER — AMLODIPINE BESYLATE 5 MG/1
5 TABLET ORAL DAILY
Qty: 30 TABLET | Refills: 0 | Status: SHIPPED | OUTPATIENT
Start: 2021-06-10 | End: 2021-07-30

## 2021-06-10 RX ORDER — MONTELUKAST SODIUM 10 MG/1
10 TABLET ORAL DAILY
Qty: 30 TABLET | Refills: 0 | Status: SHIPPED | OUTPATIENT
Start: 2021-06-10 | End: 2021-07-30

## 2021-06-10 NOTE — PROGRESS NOTES
HPI/Subjective:   Patient ID: Adeel Mtz is a 52year old female.     HPI  Patient presents with:  Blood Pressure: follow up for blood pressure   Lab Results: lab results  Allergies: eyes are watery, itching , red, taking xzyal and otc meds not helping Sodium (SINGULAIR) 10 MG Oral Tab 30 tablet 0     Sig: Take 1 tablet (10 mg total) by mouth daily.        Imaging & Referrals:  None

## 2021-07-14 ENCOUNTER — NURSE ONLY (OUTPATIENT)
Dept: ALLERGY | Facility: CLINIC | Age: 51
End: 2021-07-14
Payer: COMMERCIAL

## 2021-07-14 ENCOUNTER — TELEPHONE (OUTPATIENT)
Dept: ALLERGY | Facility: CLINIC | Age: 51
End: 2021-07-14

## 2021-07-14 ENCOUNTER — OFFICE VISIT (OUTPATIENT)
Dept: ALLERGY | Facility: CLINIC | Age: 51
End: 2021-07-14
Payer: COMMERCIAL

## 2021-07-14 VITALS
SYSTOLIC BLOOD PRESSURE: 148 MMHG | HEART RATE: 92 BPM | DIASTOLIC BLOOD PRESSURE: 94 MMHG | HEIGHT: 67 IN | TEMPERATURE: 98 F | WEIGHT: 208.63 LBS | BODY MASS INDEX: 32.74 KG/M2 | RESPIRATION RATE: 16 BRPM

## 2021-07-14 DIAGNOSIS — J30.89 PERENNIAL ALLERGIC RHINITIS WITH SEASONAL VARIATION: ICD-10-CM

## 2021-07-14 DIAGNOSIS — Z91.018 FOOD ALLERGY: ICD-10-CM

## 2021-07-14 DIAGNOSIS — J30.89 PERENNIAL ALLERGIC RHINITIS WITH SEASONAL VARIATION: Primary | ICD-10-CM

## 2021-07-14 DIAGNOSIS — J98.01 BRONCHOSPASM: ICD-10-CM

## 2021-07-14 DIAGNOSIS — J30.2 PERENNIAL ALLERGIC RHINITIS WITH SEASONAL VARIATION: ICD-10-CM

## 2021-07-14 DIAGNOSIS — J30.2 PERENNIAL ALLERGIC RHINITIS WITH SEASONAL VARIATION: Primary | ICD-10-CM

## 2021-07-14 PROCEDURE — 95004 PERQ TESTS W/ALRGNC XTRCS: CPT | Performed by: ALLERGY & IMMUNOLOGY

## 2021-07-14 PROCEDURE — 3077F SYST BP >= 140 MM HG: CPT | Performed by: ALLERGY & IMMUNOLOGY

## 2021-07-14 PROCEDURE — 3080F DIAST BP >= 90 MM HG: CPT | Performed by: ALLERGY & IMMUNOLOGY

## 2021-07-14 PROCEDURE — 99204 OFFICE O/P NEW MOD 45 MIN: CPT | Performed by: ALLERGY & IMMUNOLOGY

## 2021-07-14 PROCEDURE — 3008F BODY MASS INDEX DOCD: CPT | Performed by: ALLERGY & IMMUNOLOGY

## 2021-07-14 PROCEDURE — 95024 IQ TESTS W/ALLERGENIC XTRCS: CPT | Performed by: ALLERGY & IMMUNOLOGY

## 2021-07-14 RX ORDER — EPINEPHRINE 0.3 MG/.3ML
INJECTION SUBCUTANEOUS
Qty: 1 EACH | Refills: 0 | Status: SHIPPED | OUTPATIENT
Start: 2021-07-14

## 2021-07-14 RX ORDER — PREDNISONE 20 MG/1
20 TABLET ORAL
COMMUNITY
Start: 2021-07-11 | End: 2021-07-30

## 2021-07-14 RX ORDER — ALBUTEROL SULFATE 90 UG/1
2 AEROSOL, METERED RESPIRATORY (INHALATION) EVERY 6 HOURS PRN
Qty: 1 EACH | Refills: 0 | Status: SHIPPED | OUTPATIENT
Start: 2021-07-14 | End: 2021-11-05

## 2021-07-14 NOTE — PATIENT INSTRUCTIONS
1. Ar  Worsening symptoms. Typically worsened on a seasonal basis in the summer. Patient required ED visit with prednisone last week.   See above skin testing to environmental allergens to screen for allergic triggers    Recs:  Handouts on allergies and a

## 2021-07-14 NOTE — TELEPHONE ENCOUNTER
Received orders for immunotherapy from Dr. Iggy Sherman. Patient to begin allergy shots on 7/19/21. Allergy shot chart completed.

## 2021-07-14 NOTE — PROGRESS NOTES
Luis Lawson is a 52year old female. HPI:   Patient presents with: Allergies: seen in ER on 7/11/21. due to congestion, facial pressure, post nasal drip. was given prednisone. has not taken any antihistamines.      Patient is a 16-year-old female wh Disease Father    • Allergies Son    • No Known Problems Sister    • No Known Problems Brother    • No Known Problems Brother    • No Known Problems Brother       Social History: Social History    Tobacco Use      Smoking status: Never Smoker      Smokeles symptoms  Neurological:  Negative for dizziness, seizures  Psychiatric:  Negative for inappropriate interaction and psychiatric symptoms  Respiratory:  Negative for cough, dyspnea and wheezing      PHYSICAL EXAM:   Constitutional: responsive, no acute dist days to take full effect  Restart Xyzal 5 mg 1-2 times per day if having prominent runny nose sneezing itchy watery eyes  Restart Singulair if having more shortness of breath coughing or chest tightness  Pataday extra strength eyedrops 1 drop per eye once answered in regards to medication administration and dosing and potential side effects.  Teaching was provided via the teach back method

## 2021-07-19 ENCOUNTER — NURSE ONLY (OUTPATIENT)
Dept: ALLERGY | Facility: CLINIC | Age: 51
End: 2021-07-19
Payer: COMMERCIAL

## 2021-07-19 ENCOUNTER — OFFICE VISIT (OUTPATIENT)
Dept: RHEUMATOLOGY | Facility: CLINIC | Age: 51
End: 2021-07-19
Payer: COMMERCIAL

## 2021-07-19 VITALS
SYSTOLIC BLOOD PRESSURE: 138 MMHG | BODY MASS INDEX: 32.65 KG/M2 | WEIGHT: 208 LBS | HEART RATE: 78 BPM | HEIGHT: 67 IN | DIASTOLIC BLOOD PRESSURE: 87 MMHG

## 2021-07-19 DIAGNOSIS — M17.12 PRIMARY OSTEOARTHRITIS OF LEFT KNEE: Primary | ICD-10-CM

## 2021-07-19 DIAGNOSIS — J30.89 ENVIRONMENTAL AND SEASONAL ALLERGIES: ICD-10-CM

## 2021-07-19 PROCEDURE — 3075F SYST BP GE 130 - 139MM HG: CPT | Performed by: INTERNAL MEDICINE

## 2021-07-19 PROCEDURE — 95165 ANTIGEN THERAPY SERVICES: CPT | Performed by: ALLERGY & IMMUNOLOGY

## 2021-07-19 PROCEDURE — 99213 OFFICE O/P EST LOW 20 MIN: CPT | Performed by: INTERNAL MEDICINE

## 2021-07-19 PROCEDURE — 3008F BODY MASS INDEX DOCD: CPT | Performed by: INTERNAL MEDICINE

## 2021-07-19 PROCEDURE — 20610 DRAIN/INJ JOINT/BURSA W/O US: CPT | Performed by: INTERNAL MEDICINE

## 2021-07-19 PROCEDURE — 95117 IMMUNOTHERAPY INJECTIONS: CPT | Performed by: ALLERGY & IMMUNOLOGY

## 2021-07-19 PROCEDURE — 3079F DIAST BP 80-89 MM HG: CPT | Performed by: INTERNAL MEDICINE

## 2021-07-19 RX ORDER — TRIAMCINOLONE ACETONIDE 40 MG/ML
40 INJECTION, SUSPENSION INTRA-ARTICULAR; INTRAMUSCULAR ONCE
Status: COMPLETED | OUTPATIENT
Start: 2021-07-19 | End: 2021-07-19

## 2021-07-19 RX ADMIN — TRIAMCINOLONE ACETONIDE 40 MG: 40 INJECTION, SUSPENSION INTRA-ARTICULAR; INTRAMUSCULAR at 11:00:00

## 2021-07-19 NOTE — PROGRESS NOTES
Faith Mcguire is a 52year old female. HPI:   No chief complaint on file. I had the pleasure of seeing Faith Mcguire on 7/19/2021 for follow up L knee pain.   She was last seen June 2020     Current Medications:  Tylenol arthritis or aleve as ne visit):  Current Outpatient Medications   Medication Sig Dispense Refill   • predniSONE 20 MG Oral Tab 20 mg.     • Albuterol Sulfate HFA (PROAIR HFA) 108 (90 Base) MCG/ACT Inhalation Aero Soln Inhale 2 puffs into the lungs every 6 (six) hours as needed fo palpation. NEURO: Cranial nerves II-XII intact grossly. 5/5 strength throughout in both upper and lower extremities, sensation intact.   PSYCH: normal mood       LABS:     Component      Latest Ref Rng & Units 6/17/2020   C-REACTIVE PROTEIN      <0.30 mg/d

## 2021-07-19 NOTE — PATIENT INSTRUCTIONS
You are seen today for left knee pain due to osteoarthritis, this is wear-and-tear of the joint and the cartilage  We injected your left knee with cortisone, hopefully that will help  We will also send you to physical therapy  On days where your knee pain

## 2021-07-19 NOTE — PROCEDURES
With paitent's consent, I injected pt's L knee with 1ml lidocaine 1 % and 1 ml kenalog 40. It was done under sterile technique using iodine and alcohol swabs and ethyl chloride was used as an anaesthetic spray. Pt.  tolerated it well.

## 2021-07-26 ENCOUNTER — NURSE ONLY (OUTPATIENT)
Dept: ALLERGY | Facility: CLINIC | Age: 51
End: 2021-07-26
Payer: COMMERCIAL

## 2021-07-26 DIAGNOSIS — J30.89 ENVIRONMENTAL AND SEASONAL ALLERGIES: ICD-10-CM

## 2021-07-26 PROCEDURE — 95117 IMMUNOTHERAPY INJECTIONS: CPT | Performed by: ALLERGY & IMMUNOLOGY

## 2021-07-30 ENCOUNTER — OFFICE VISIT (OUTPATIENT)
Dept: FAMILY MEDICINE CLINIC | Facility: CLINIC | Age: 51
End: 2021-07-30
Payer: COMMERCIAL

## 2021-07-30 VITALS
HEIGHT: 67 IN | WEIGHT: 210.81 LBS | DIASTOLIC BLOOD PRESSURE: 80 MMHG | SYSTOLIC BLOOD PRESSURE: 140 MMHG | HEART RATE: 77 BPM | BODY MASS INDEX: 33.09 KG/M2

## 2021-07-30 DIAGNOSIS — I10 ESSENTIAL HYPERTENSION: Primary | ICD-10-CM

## 2021-07-30 PROCEDURE — 3008F BODY MASS INDEX DOCD: CPT | Performed by: FAMILY MEDICINE

## 2021-07-30 PROCEDURE — 99213 OFFICE O/P EST LOW 20 MIN: CPT | Performed by: FAMILY MEDICINE

## 2021-07-30 PROCEDURE — 3077F SYST BP >= 140 MM HG: CPT | Performed by: FAMILY MEDICINE

## 2021-07-30 PROCEDURE — 3079F DIAST BP 80-89 MM HG: CPT | Performed by: FAMILY MEDICINE

## 2021-07-30 RX ORDER — AMLODIPINE BESYLATE 5 MG/1
5 TABLET ORAL DAILY
Qty: 30 TABLET | Refills: 3 | Status: SHIPPED | OUTPATIENT
Start: 2021-07-30 | End: 2021-12-17

## 2021-07-30 NOTE — PROGRESS NOTES
HPI/Subjective:   Patient ID: Ana María Rizvi is a 52year old female. HPI  Patient presents with:  Blood Pressure: f/u on blood pressure  feeling well and tolerated med. History/Other:   Review of Systems   Constitutional: Negative.     Respiratory: N

## 2021-08-03 ENCOUNTER — TELEPHONE (OUTPATIENT)
Dept: ALLERGY | Facility: CLINIC | Age: 51
End: 2021-08-03

## 2021-08-03 NOTE — TELEPHONE ENCOUNTER
Patient missed her allergy shot appointment yesterday and wants to know if she should schedule another appointment for this week. Her next allergy shot appointment is for 8/9.     Please call and advise- [phone]

## 2021-08-03 NOTE — TELEPHONE ENCOUNTER
Spoke with patient. Verified name and . Informed patient that she has up to 14 days to build in between allergy shots, last injection was 21 and since she has an appointment on 21 that will be 14 days to increase her dosing.  Patient verbalizes

## 2021-09-08 ENCOUNTER — LAB ENCOUNTER (OUTPATIENT)
Dept: LAB | Facility: HOSPITAL | Age: 51
End: 2021-09-08
Attending: INTERNAL MEDICINE
Payer: COMMERCIAL

## 2021-09-08 ENCOUNTER — OFFICE VISIT (OUTPATIENT)
Dept: RHEUMATOLOGY | Facility: CLINIC | Age: 51
End: 2021-09-08
Payer: COMMERCIAL

## 2021-09-08 VITALS
DIASTOLIC BLOOD PRESSURE: 96 MMHG | SYSTOLIC BLOOD PRESSURE: 153 MMHG | HEIGHT: 67 IN | HEART RATE: 91 BPM | WEIGHT: 210 LBS | BODY MASS INDEX: 32.96 KG/M2

## 2021-09-08 DIAGNOSIS — M25.531 RIGHT WRIST PAIN: Primary | ICD-10-CM

## 2021-09-08 LAB
CRP SERPL-MCNC: <0.29 MG/DL (ref ?–0.3)
ERYTHROCYTE [SEDIMENTATION RATE] IN BLOOD: 30 MM/HR
RHEUMATOID FACT SERPL-ACNC: <10 IU/ML (ref ?–15)

## 2021-09-08 PROCEDURE — A4570 SPLINT: HCPCS | Performed by: INTERNAL MEDICINE

## 2021-09-08 PROCEDURE — 3077F SYST BP >= 140 MM HG: CPT | Performed by: INTERNAL MEDICINE

## 2021-09-08 PROCEDURE — 3008F BODY MASS INDEX DOCD: CPT | Performed by: INTERNAL MEDICINE

## 2021-09-08 PROCEDURE — 85652 RBC SED RATE AUTOMATED: CPT | Performed by: INTERNAL MEDICINE

## 2021-09-08 PROCEDURE — 36415 COLL VENOUS BLD VENIPUNCTURE: CPT | Performed by: INTERNAL MEDICINE

## 2021-09-08 PROCEDURE — 86140 C-REACTIVE PROTEIN: CPT | Performed by: INTERNAL MEDICINE

## 2021-09-08 PROCEDURE — 3080F DIAST BP >= 90 MM HG: CPT | Performed by: INTERNAL MEDICINE

## 2021-09-08 PROCEDURE — 86431 RHEUMATOID FACTOR QUANT: CPT | Performed by: INTERNAL MEDICINE

## 2021-09-08 PROCEDURE — 86200 CCP ANTIBODY: CPT | Performed by: INTERNAL MEDICINE

## 2021-09-08 PROCEDURE — 99214 OFFICE O/P EST MOD 30 MIN: CPT | Performed by: INTERNAL MEDICINE

## 2021-09-08 RX ORDER — NAPROXEN 500 MG/1
500 TABLET ORAL 2 TIMES DAILY WITH MEALS
Qty: 60 TABLET | Refills: 0 | Status: SHIPPED | OUTPATIENT
Start: 2021-09-08 | End: 2021-10-14

## 2021-09-08 NOTE — PATIENT INSTRUCTIONS
You were seen today for right wrist and hand pain with numbness and tingling  It seems like your symptoms are possibly from carpal tunnel  Lets get some blood work today  Try naproxen 500 mg twice a day as needed, take with food.   Do not take with Aleve or

## 2021-09-08 NOTE — PROGRESS NOTES
Elizabeth Drake is a 52year old female. 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 NEURO: Cranial nerves II-XII intact grossly. 5/5 strength throughout in both upper and lower extremities, sensation intact.   PSYCH: normal mood       LABS:     Component      Latest Ref Rng & Units 6/17/2020   C-REACTIV

## 2021-09-10 LAB — CCP IGG SERPL-ACNC: 1.2 U/ML (ref 0–6.9)

## 2021-10-04 RX ORDER — ZOLPIDEM TARTRATE 10 MG/1
10 TABLET ORAL NIGHTLY PRN
Qty: 30 TABLET | Refills: 0 | Status: SHIPPED | OUTPATIENT
Start: 2021-10-04 | End: 2022-05-12

## 2021-10-06 ENCOUNTER — PROCEDURE VISIT (OUTPATIENT)
Dept: NEUROLOGY | Facility: CLINIC | Age: 51
End: 2021-10-06
Payer: COMMERCIAL

## 2021-10-06 PROCEDURE — 95909 NRV CNDJ TST 5-6 STUDIES: CPT | Performed by: OTHER

## 2021-10-06 PROCEDURE — 95886 MUSC TEST DONE W/N TEST COMP: CPT | Performed by: OTHER

## 2021-10-06 NOTE — PROCEDURES
HISTORY:    Luis Lawson is a 52year old female with a complaint of numbness and tingling in the right hand, mostly involving the first 3 fingers of the right hand.     PHYSICAL:    Cranial nerves grossly normal. Motor examination showed strength to be NCS      Nerve / Sites Muscle Latency Amplitude Rel Amp Durat Segments Distance Lat Diff Velocity     ms mV % ms  cm ms m/s   R Median - APB      Wrist APB 3.59 9.9 100 8.96 Wrist - APB 8        Elbow APB 8.28 9.9 99.8 9.22 Elbow - Wrist 25 4.69 53   R Uln mononeuropathy (such as ulnar  mononeuropathy), peripheral polyneuropathy or cervical radiculopathy. CLINICAL CORRELATION:    These abnormal findings are consistent with the patient's clinical complaints on peripheral nerve or muscle basis.     Kayley Pierre

## 2021-10-08 ENCOUNTER — OFFICE VISIT (OUTPATIENT)
Dept: RHEUMATOLOGY | Facility: CLINIC | Age: 51
End: 2021-10-08
Payer: COMMERCIAL

## 2021-10-08 VITALS
HEART RATE: 77 BPM | BODY MASS INDEX: 32.96 KG/M2 | HEIGHT: 67 IN | DIASTOLIC BLOOD PRESSURE: 91 MMHG | WEIGHT: 210 LBS | SYSTOLIC BLOOD PRESSURE: 145 MMHG

## 2021-10-08 DIAGNOSIS — G56.01 CARPAL TUNNEL SYNDROME ON RIGHT: Primary | ICD-10-CM

## 2021-10-08 PROCEDURE — 3008F BODY MASS INDEX DOCD: CPT | Performed by: INTERNAL MEDICINE

## 2021-10-08 PROCEDURE — 3080F DIAST BP >= 90 MM HG: CPT | Performed by: INTERNAL MEDICINE

## 2021-10-08 PROCEDURE — 99213 OFFICE O/P EST LOW 20 MIN: CPT | Performed by: INTERNAL MEDICINE

## 2021-10-08 PROCEDURE — 20526 THER INJECTION CARP TUNNEL: CPT | Performed by: INTERNAL MEDICINE

## 2021-10-08 PROCEDURE — 3077F SYST BP >= 140 MM HG: CPT | Performed by: INTERNAL MEDICINE

## 2021-10-08 RX ORDER — METHYLPREDNISOLONE ACETATE 80 MG/ML
40 INJECTION, SUSPENSION INTRA-ARTICULAR; INTRALESIONAL; INTRAMUSCULAR; SOFT TISSUE ONCE
Status: COMPLETED | OUTPATIENT
Start: 2021-10-08 | End: 2021-10-08

## 2021-10-08 RX ADMIN — METHYLPREDNISOLONE ACETATE 40 MG: 80 INJECTION, SUSPENSION INTRA-ARTICULAR; INTRALESIONAL; INTRAMUSCULAR; SOFT TISSUE at 03:55:00

## 2021-10-08 NOTE — PROGRESS NOTES
Tory Vivar is a 52year old female. HPI:   Patient presents with:   Follow - Up  Wrist Pain: Feels worsening pain  Test Results      I had the pleasure of seeing Tory Vivar on 10/8/2021 for follow up R wrist pain with numbness and tingling 2/2 moderate carpal tunnel symptoms  She states her symptoms are more persistent now, has chronic numbness and tingling in her right hand  She has tried prednisone, naproxen and Tylenol with minimal relief  Continues to wear the wrist splints at night  Has not RRR, no murmurs rubs or gallops. Equal 2+ distal pulses. LUNGS: CTAB, no increased work of breathing  ABDOMEN:  soft NT/ND, +BS, no HSM  SKIN: No rashes or skin lesions.  No nail findings  MSK:  Cervical spine: FROM  Hands: no synovitis in DIP, PIP and MC

## 2021-10-08 NOTE — PATIENT INSTRUCTIONS
You were seen today for right hand numbness and tingling due to carpal tunnel  The EMG/nerve test was done showing moderate carpal tunnel in that hand  We injected that right hand with cortisone, hopefully will help  We'll be referring you to the hand surg

## 2021-10-08 NOTE — PROCEDURES
With  consent, I injected the patient's  R carpal tunnel with 0.5ml lidocaine  1% and 0.5ml Depomedrol 80. It was under sterile technique using iodine and alcohol swabs and ethyl chloride was used as an anaesthetic spray. Pt.  tolerated it well. fall down stairs

## 2021-10-14 DIAGNOSIS — M25.531 RIGHT WRIST PAIN: ICD-10-CM

## 2021-10-14 NOTE — TELEPHONE ENCOUNTER
LOV: 10/8/21  Last Refilled:#60, 0rfs 9/8/21    Future Appointments   Date Time Provider Lacy Pichardoi   11/8/2021  1:00 PM Nikita Germain MD [facility]       ASSESSMENT/PLAN:      R hand CTS  - EMG done consistent with moderate carpal t

## 2021-10-15 RX ORDER — NAPROXEN 500 MG/1
500 TABLET ORAL 2 TIMES DAILY WITH MEALS
Qty: 60 TABLET | Refills: 0 | Status: SHIPPED | OUTPATIENT
Start: 2021-10-15 | End: 2021-11-08 | Stop reason: ALTCHOICE

## 2021-11-05 RX ORDER — ALBUTEROL SULFATE 90 UG/1
AEROSOL, METERED RESPIRATORY (INHALATION)
Qty: 25.5 G | Refills: 0 | OUTPATIENT
Start: 2021-11-05

## 2021-11-05 RX ORDER — ALBUTEROL SULFATE 90 UG/1
AEROSOL, METERED RESPIRATORY (INHALATION)
Qty: 8.5 G | Refills: 0 | Status: SHIPPED | OUTPATIENT
Start: 2021-11-05

## 2021-11-05 NOTE — TELEPHONE ENCOUNTER
Refill requested for:  Name from pharmacy: ALBUTEROL HFA INH (200 PUFFS)8.5GM          Will file in chart as: ALBUTEROL 108 (90 Base) MCG/ACT Inhalation Aero Soln    Sig: INHALE 2 PUFFS BY MOUTH EVERY 6 HOURS AS NEEDED FOR WHEEZING    Disp:  8.5 g    Refil

## 2021-11-05 NOTE — TELEPHONE ENCOUNTER
Refill requested for:  Name from pharmacy: ALBUTEROL HFA INH (200 PUFFS)8.5GM          Will file in chart as: ALBUTEROL 108 (90 Base) MCG/ACT Inhalation Aero Soln    Sig: INHALE 2 PUFFS BY MOUTH EVERY 6 HOURS AS NEEDED FOR WHEEZING    Disp:  25.5 g    Refi

## 2021-11-05 NOTE — TELEPHONE ENCOUNTER
RN called pt to check on asthma status. She denies any asthma difficulties. She is aware of RX refill. Follow up scheduled for 11/29/2021.

## 2021-11-08 ENCOUNTER — OFFICE VISIT (OUTPATIENT)
Dept: SURGERY | Facility: CLINIC | Age: 51
End: 2021-11-08
Payer: COMMERCIAL

## 2021-11-08 DIAGNOSIS — G56.01 RIGHT CARPAL TUNNEL SYNDROME: Primary | ICD-10-CM

## 2021-11-08 PROCEDURE — 99243 OFF/OP CNSLTJ NEW/EST LOW 30: CPT | Performed by: PLASTIC SURGERY

## 2021-11-08 RX ORDER — HYDROCODONE BITARTRATE AND ACETAMINOPHEN 7.5; 325 MG/1; MG/1
1 TABLET ORAL
Qty: 10 TABLET | Refills: 0 | Status: SHIPPED | OUTPATIENT
Start: 2021-11-08

## 2021-11-08 NOTE — H&P
Theron Clarke is a 52year old female that presents with Patient presents with:  Carpal Tunnel Syndrome: Bilateral   .    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    POST-OPERATIVE  PROTOCOL:  We discussed post-operative restrictions at length. It is critical to maintain the dressing post-operatively and to comply with post-operative instructions.   The dressing m

## 2021-11-08 NOTE — PROGRESS NOTES
Patient request for surgery signed by patient and witnessed and signed by RN. Prescription for Norco  and narcotic prescription electronically sent to pharmacy per Dr. Townsend Merlin order and patient instructed to  prescription before surgery.    Pre

## 2021-11-10 ENCOUNTER — TELEPHONE (OUTPATIENT)
Dept: SURGERY | Facility: CLINIC | Age: 51
End: 2021-11-10

## 2021-11-10 NOTE — TELEPHONE ENCOUNTER
Patient dropped off FMLA forms to . FCR and HIPPA signed and fee collected.  Original scanned emailed and Placed in [facility]

## 2021-11-12 ENCOUNTER — OFFICE VISIT (OUTPATIENT)
Dept: RHEUMATOLOGY | Facility: CLINIC | Age: 51
End: 2021-11-12
Payer: COMMERCIAL

## 2021-11-12 VITALS — HEART RATE: 90 BPM | SYSTOLIC BLOOD PRESSURE: 147 MMHG | DIASTOLIC BLOOD PRESSURE: 89 MMHG

## 2021-11-12 DIAGNOSIS — R20.2 NUMBNESS AND TINGLING IN LEFT HAND: Primary | ICD-10-CM

## 2021-11-12 DIAGNOSIS — R20.0 NUMBNESS AND TINGLING IN LEFT HAND: Primary | ICD-10-CM

## 2021-11-12 PROCEDURE — 99213 OFFICE O/P EST LOW 20 MIN: CPT | Performed by: INTERNAL MEDICINE

## 2021-11-12 PROCEDURE — 3079F DIAST BP 80-89 MM HG: CPT | Performed by: INTERNAL MEDICINE

## 2021-11-12 PROCEDURE — 3077F SYST BP >= 140 MM HG: CPT | Performed by: INTERNAL MEDICINE

## 2021-11-12 PROCEDURE — 20526 THER INJECTION CARP TUNNEL: CPT | Performed by: INTERNAL MEDICINE

## 2021-11-12 RX ORDER — METHYLPREDNISOLONE ACETATE 80 MG/ML
40 INJECTION, SUSPENSION INTRA-ARTICULAR; INTRALESIONAL; INTRAMUSCULAR; SOFT TISSUE ONCE
Status: COMPLETED | OUTPATIENT
Start: 2021-11-12 | End: 2021-11-12

## 2021-11-12 RX ADMIN — METHYLPREDNISOLONE ACETATE 40 MG: 80 INJECTION, SUSPENSION INTRA-ARTICULAR; INTRALESIONAL; INTRAMUSCULAR; SOFT TISSUE at 15:30:00

## 2021-11-12 NOTE — PATIENT INSTRUCTIONS
You were seen today for the L hand numbness and tingling   We injected the left wrist with some cortisone, hopefully will help  Again if you continue to the symptoms let me know, and we will order the EMG

## 2021-11-12 NOTE — PROCEDURES
With  consent, I injected the patient's L carpal tunnel with 0.5ml lidocaine  1% and 0.5ml Depomedrol 80. It was under sterile technique using iodine and alcohol swabs and ethyl chloride was used as an anaesthetic spray. Pt.  tolerated it well.

## 2021-11-12 NOTE — PROGRESS NOTES
Milvia Young is a 52year old female. HPI:   Patient presents with:   Follow - Up  Wrist Pain: Left wrist pain      I had the pleasure of seeing Milvia Young on 11/12/2021 for follow up R wrist pain with numbness and tingling 2/2 Carpal tunnel synd tunnel symptoms  She states her symptoms are more persistent now, has chronic numbness and tingling in her right hand  She has tried prednisone, naproxen and Tylenol with minimal relief  Continues to wear the wrist splints at night  Has noticed some numbne NAD  HEENT: EOMI, PERRLA, no injection or icterus, oral mucosa moist, no oral lesions. No lymphadenopathy. No facial rash  CVS: RRR, no murmurs rubs or gallops. Equal 2+ distal pulses.    LUNGS: CTAB, no increased work of breathing  ABDOMEN:  soft NT/ND, +B hand surgeon and will be having surgery Dec 10    L knee pain 2/2 OA- stable  - X-ray of left knee did show evidence of mild osteoarthritis and joint effusion  - s/p cortisone injection July 2021, doing well now     Pt will f/u as needed     LEA Mathias

## 2021-11-16 NOTE — TELEPHONE ENCOUNTER
Pt called to check status of forms. Informed her again of turn around time and reminded her it was stated on the FCR she signed in office. Pt expressed understanding.

## 2021-11-17 RX ORDER — NAPROXEN 500 MG/1
500 TABLET ORAL 2 TIMES DAILY WITH MEALS
Qty: 60 TABLET | Refills: 0 | Status: SHIPPED | OUTPATIENT
Start: 2021-11-17 | End: 2021-12-23

## 2021-11-17 NOTE — TELEPHONE ENCOUNTER
Last filled: 10/15/21 #60 tab with 0 refills- Discontinued    LOV:  11/12/21  Future Appointments   Date Time Provider Lacy Puentes   11/29/2021  9:45 AM Hector Diggs MD [facility]   12/10/2021  9:45 AM Nancy Maxwell MD [facility]

## 2021-11-24 ENCOUNTER — TELEPHONE (OUTPATIENT)
Dept: SURGERY | Facility: CLINIC | Age: 51
End: 2021-11-24

## 2021-11-24 NOTE — H&P
Dioni Plasencia is a 52year old female that presents with Patient presents with:  Carpal Tunnel Syndrome: Bilateral   .    REFERRED BY:  Evi Guerrero     NO LECTR    Pacemaker: No  Latex Allergy: no  Coumadin: No  Plavix: No  Other anticoagulants: No  Card Medical History:   Diagnosis Date   • Allergy     allergies   • Amenorrhea [id number]   • Decorative tattoo    • History of pregnancy 1992, 2006    1992-; -induced    • Unspecified disorder of neck     overactive gland in neck, surgical removal lung CA   • Heart Disease Father    • Allergies Son    • No Known Problems Sister    • No Known Problems Brother    • No Known Problems Brother    • No Known Problems Brother           PHYSICAL EXAM:     CONSTITUTIONAL: Overall appearance - Normal may be necessary    Even with surgery that decompresses the nerve, nerve function may not return. After relief of pressure on the nerve, the nerve must recover.   This could take up to a year, but the nerve may not recover even after pressure is relieved,

## 2021-11-24 NOTE — TELEPHONE ENCOUNTER
Dr. Ho Aguirre,     Please sign off on form: FMLA   -Highlight the patient and hit \"Chart\" button.   -In Chart Review, w/in the Encounter tab - click 1 time on the Telephone call encounter for 11/10/21 Scroll down the telephone encounter.  -Click \"scan o

## 2021-11-24 NOTE — TELEPHONE ENCOUNTER
Spoke with patient. All changes to medications and allergies, per patient report, have been documented in the medical record. Patient  has not been ill, hospitalized, or had any surgical procedures since last seen in our office on 11/8/21.

## 2021-11-29 ENCOUNTER — OFFICE VISIT (OUTPATIENT)
Dept: ALLERGY | Facility: CLINIC | Age: 51
End: 2021-11-29
Payer: COMMERCIAL

## 2021-11-29 VITALS
HEART RATE: 100 BPM | BODY MASS INDEX: 34.21 KG/M2 | OXYGEN SATURATION: 97 % | WEIGHT: 218 LBS | TEMPERATURE: 98 F | HEIGHT: 67 IN | SYSTOLIC BLOOD PRESSURE: 172 MMHG | RESPIRATION RATE: 18 BRPM | DIASTOLIC BLOOD PRESSURE: 114 MMHG

## 2021-11-29 DIAGNOSIS — J30.2 PERENNIAL ALLERGIC RHINITIS WITH SEASONAL VARIATION: ICD-10-CM

## 2021-11-29 DIAGNOSIS — J30.89 PERENNIAL ALLERGIC RHINITIS WITH SEASONAL VARIATION: ICD-10-CM

## 2021-11-29 DIAGNOSIS — Z91.018 FOOD ALLERGY: ICD-10-CM

## 2021-11-29 DIAGNOSIS — J45.909 EXTRINSIC ASTHMA WITHOUT COMPLICATION, UNSPECIFIED ASTHMA SEVERITY, UNSPECIFIED WHETHER PERSISTENT: Primary | ICD-10-CM

## 2021-11-29 DIAGNOSIS — T63.481A INSECT STINGS, ACCIDENTAL OR UNINTENTIONAL, INITIAL ENCOUNTER: ICD-10-CM

## 2021-11-29 DIAGNOSIS — Z92.29 COVID-19 VACCINE SERIES COMPLETED: ICD-10-CM

## 2021-11-29 PROCEDURE — 99214 OFFICE O/P EST MOD 30 MIN: CPT | Performed by: ALLERGY & IMMUNOLOGY

## 2021-11-29 PROCEDURE — 3080F DIAST BP >= 90 MM HG: CPT | Performed by: ALLERGY & IMMUNOLOGY

## 2021-11-29 PROCEDURE — 3077F SYST BP >= 140 MM HG: CPT | Performed by: ALLERGY & IMMUNOLOGY

## 2021-11-29 PROCEDURE — 3008F BODY MASS INDEX DOCD: CPT | Performed by: ALLERGY & IMMUNOLOGY

## 2021-11-29 RX ORDER — KETOCONAZOLE 20 MG/G
CREAM TOPICAL
Qty: 60 G | Refills: 0 | Status: SHIPPED | OUTPATIENT
Start: 2021-11-29

## 2021-11-29 NOTE — TELEPHONE ENCOUNTER
Completed FMLA faxed to [address] [place] [address] [phone] and copy sent through [facility]. Patient contacted Ortho for status and Ortho will inform patient it was faxed.

## 2021-11-29 NOTE — PROGRESS NOTES
Luna Peralta is a 52year old female. HPI:   Patient presents with:  Asthma: feels well  Rash Skin Problem: bug bite?   Left breast, itching bumps    Patient is a 61-year-old female who presents for follow-up with a chief complaint of asthma and allerg History   Problem Relation Age of Onset   • Diabetes Mother    • Cancer Mother         lung CA   • Heart Disease Father    • Allergies Son    • No Known Problems Sister    • No Known Problems Brother    • No Known Problems Brother    • No Known Problems Br diarrhea and vomiting  Integumentary: see hpi   Respiratory:   See hpi   PHYSICAL EXAM:   Constitutional: responsive, no acute distress noted  Head/Face: NC/Atraumatic  Eyes/Vision: conjunctiva and lids are normal extraocular motion is intact   Ears/Audiom having prominent nasal congestion      4. Covid vaccination up-to-date x2 doses. Reviewed booster once indicated      5. Recommend flu vaccination       Orders This Visit:  No orders of the defined types were placed in this encounter.       Meds This Vis

## 2021-11-29 NOTE — PATIENT INSTRUCTIONS
1.  Dermatitis  Secondary most likely to insect sting to the left breast.  Start trial of triamcinolone 0.1% ointment twice a day.   Keep clean and dry with soap and water twice a day  If area under the breast is not improving then we will add a topical ant

## 2021-12-09 ENCOUNTER — LAB ENCOUNTER (OUTPATIENT)
Dept: LAB | Facility: HOSPITAL | Age: 51
End: 2021-12-09
Attending: PLASTIC SURGERY
Payer: COMMERCIAL

## 2021-12-09 DIAGNOSIS — Z01.818 PREOP TESTING: ICD-10-CM

## 2021-12-10 ENCOUNTER — ANESTHESIA (OUTPATIENT)
Dept: SURGERY | Facility: HOSPITAL | Age: 51
End: 2021-12-10
Payer: COMMERCIAL

## 2021-12-10 ENCOUNTER — HOSPITAL ENCOUNTER (OUTPATIENT)
Facility: HOSPITAL | Age: 51
Setting detail: HOSPITAL OUTPATIENT SURGERY
Discharge: HOME | End: 2021-12-10
Attending: PLASTIC SURGERY | Admitting: PLASTIC SURGERY
Payer: COMMERCIAL

## 2021-12-10 ENCOUNTER — ANESTHESIA EVENT (OUTPATIENT)
Dept: SURGERY | Facility: HOSPITAL | Age: 51
End: 2021-12-10
Payer: COMMERCIAL

## 2021-12-10 ENCOUNTER — HOSPITAL DOCUMENTATION (OUTPATIENT)
Dept: SURGERY | Facility: CLINIC | Age: 51
End: 2021-12-10

## 2021-12-10 VITALS
SYSTOLIC BLOOD PRESSURE: 121 MMHG | DIASTOLIC BLOOD PRESSURE: 88 MMHG | TEMPERATURE: 97 F | WEIGHT: 216 LBS | HEIGHT: 67 IN | OXYGEN SATURATION: 98 % | HEART RATE: 79 BPM | RESPIRATION RATE: 18 BRPM | BODY MASS INDEX: 33.9 KG/M2

## 2021-12-10 DIAGNOSIS — G56.01 RIGHT CARPAL TUNNEL SYNDROME: Primary | ICD-10-CM

## 2021-12-10 DIAGNOSIS — Z01.818 PREOP TESTING: Primary | ICD-10-CM

## 2021-12-10 DIAGNOSIS — G56.01 RIGHT CARPAL TUNNEL SYNDROME: ICD-10-CM

## 2021-12-10 PROCEDURE — 29848 WRIST ENDOSCOPY/SURGERY: CPT | Performed by: PLASTIC SURGERY

## 2021-12-10 PROCEDURE — 01N54ZZ RELEASE MEDIAN NERVE, PERCUTANEOUS ENDOSCOPIC APPROACH: ICD-10-PCS | Performed by: PLASTIC SURGERY

## 2021-12-10 RX ORDER — FAMOTIDINE 20 MG/1
20 TABLET ORAL ONCE
Status: COMPLETED | OUTPATIENT
Start: 2021-12-10 | End: 2021-12-10

## 2021-12-10 RX ORDER — PROCHLORPERAZINE EDISYLATE 5 MG/ML
5 INJECTION INTRAMUSCULAR; INTRAVENOUS ONCE AS NEEDED
Status: DISCONTINUED | OUTPATIENT
Start: 2021-12-10 | End: 2021-12-10

## 2021-12-10 RX ORDER — HYDROMORPHONE HYDROCHLORIDE 1 MG/ML
0.6 INJECTION, SOLUTION INTRAMUSCULAR; INTRAVENOUS; SUBCUTANEOUS EVERY 5 MIN PRN
Status: DISCONTINUED | OUTPATIENT
Start: 2021-12-10 | End: 2021-12-10

## 2021-12-10 RX ORDER — SODIUM CHLORIDE, SODIUM LACTATE, POTASSIUM CHLORIDE, CALCIUM CHLORIDE 600; 310; 30; 20 MG/100ML; MG/100ML; MG/100ML; MG/100ML
INJECTION, SOLUTION INTRAVENOUS CONTINUOUS
Status: DISCONTINUED | OUTPATIENT
Start: 2021-12-10 | End: 2021-12-10

## 2021-12-10 RX ORDER — HYDROMORPHONE HYDROCHLORIDE 1 MG/ML
0.2 INJECTION, SOLUTION INTRAMUSCULAR; INTRAVENOUS; SUBCUTANEOUS EVERY 5 MIN PRN
Status: DISCONTINUED | OUTPATIENT
Start: 2021-12-10 | End: 2021-12-10

## 2021-12-10 RX ORDER — DEXAMETHASONE SODIUM PHOSPHATE 4 MG/ML
VIAL (ML) INJECTION AS NEEDED
Status: DISCONTINUED | OUTPATIENT
Start: 2021-12-10 | End: 2021-12-10 | Stop reason: SURG

## 2021-12-10 RX ORDER — MORPHINE SULFATE 4 MG/ML
2 INJECTION, SOLUTION INTRAMUSCULAR; INTRAVENOUS EVERY 10 MIN PRN
Status: DISCONTINUED | OUTPATIENT
Start: 2021-12-10 | End: 2021-12-10

## 2021-12-10 RX ORDER — MORPHINE SULFATE 4 MG/ML
4 INJECTION, SOLUTION INTRAMUSCULAR; INTRAVENOUS EVERY 10 MIN PRN
Status: DISCONTINUED | OUTPATIENT
Start: 2021-12-10 | End: 2021-12-10

## 2021-12-10 RX ORDER — HYDROCODONE BITARTRATE AND ACETAMINOPHEN 5; 325 MG/1; MG/1
2 TABLET ORAL AS NEEDED
Status: COMPLETED | OUTPATIENT
Start: 2021-12-10 | End: 2021-12-10

## 2021-12-10 RX ORDER — HYDROCODONE BITARTRATE AND ACETAMINOPHEN 5; 325 MG/1; MG/1
1 TABLET ORAL AS NEEDED
Status: COMPLETED | OUTPATIENT
Start: 2021-12-10 | End: 2021-12-10

## 2021-12-10 RX ORDER — METOCLOPRAMIDE 10 MG/1
10 TABLET ORAL ONCE
Status: COMPLETED | OUTPATIENT
Start: 2021-12-10 | End: 2021-12-10

## 2021-12-10 RX ORDER — HYDROMORPHONE HYDROCHLORIDE 1 MG/ML
0.4 INJECTION, SOLUTION INTRAMUSCULAR; INTRAVENOUS; SUBCUTANEOUS EVERY 5 MIN PRN
Status: DISCONTINUED | OUTPATIENT
Start: 2021-12-10 | End: 2021-12-10

## 2021-12-10 RX ORDER — NALOXONE HYDROCHLORIDE 0.4 MG/ML
80 INJECTION, SOLUTION INTRAMUSCULAR; INTRAVENOUS; SUBCUTANEOUS AS NEEDED
Status: DISCONTINUED | OUTPATIENT
Start: 2021-12-10 | End: 2021-12-10

## 2021-12-10 RX ORDER — MORPHINE SULFATE 10 MG/ML
6 INJECTION, SOLUTION INTRAMUSCULAR; INTRAVENOUS EVERY 10 MIN PRN
Status: DISCONTINUED | OUTPATIENT
Start: 2021-12-10 | End: 2021-12-10

## 2021-12-10 RX ORDER — ACETAMINOPHEN 500 MG
1000 TABLET ORAL ONCE
Status: COMPLETED | OUTPATIENT
Start: 2021-12-10 | End: 2021-12-10

## 2021-12-10 RX ORDER — LIDOCAINE HYDROCHLORIDE 10 MG/ML
INJECTION, SOLUTION EPIDURAL; INFILTRATION; INTRACAUDAL; PERINEURAL AS NEEDED
Status: DISCONTINUED | OUTPATIENT
Start: 2021-12-10 | End: 2021-12-10 | Stop reason: SURG

## 2021-12-10 RX ORDER — ONDANSETRON 2 MG/ML
4 INJECTION INTRAMUSCULAR; INTRAVENOUS ONCE AS NEEDED
Status: DISCONTINUED | OUTPATIENT
Start: 2021-12-10 | End: 2021-12-10

## 2021-12-10 RX ORDER — HYDROCODONE BITARTRATE AND ACETAMINOPHEN 7.5; 325 MG/1; MG/1
1 TABLET ORAL EVERY 4 HOURS PRN
Status: DISCONTINUED | OUTPATIENT
Start: 2021-12-10 | End: 2021-12-10

## 2021-12-10 RX ORDER — ONDANSETRON 2 MG/ML
INJECTION INTRAMUSCULAR; INTRAVENOUS AS NEEDED
Status: DISCONTINUED | OUTPATIENT
Start: 2021-12-10 | End: 2021-12-10 | Stop reason: SURG

## 2021-12-10 RX ORDER — MIDAZOLAM HYDROCHLORIDE 1 MG/ML
INJECTION INTRAMUSCULAR; INTRAVENOUS AS NEEDED
Status: DISCONTINUED | OUTPATIENT
Start: 2021-12-10 | End: 2021-12-10 | Stop reason: SURG

## 2021-12-10 RX ADMIN — LIDOCAINE HYDROCHLORIDE 50 MG: 10 INJECTION, SOLUTION EPIDURAL; INFILTRATION; INTRACAUDAL; PERINEURAL at 10:11:00

## 2021-12-10 RX ADMIN — MIDAZOLAM HYDROCHLORIDE 2 MG: 1 INJECTION INTRAMUSCULAR; INTRAVENOUS at 10:07:00

## 2021-12-10 RX ADMIN — ONDANSETRON 4 MG: 2 INJECTION INTRAMUSCULAR; INTRAVENOUS at 10:11:00

## 2021-12-10 RX ADMIN — SODIUM CHLORIDE, SODIUM LACTATE, POTASSIUM CHLORIDE, CALCIUM CHLORIDE: 600; 310; 30; 20 INJECTION, SOLUTION INTRAVENOUS at 10:48:00

## 2021-12-10 RX ADMIN — DEXAMETHASONE SODIUM PHOSPHATE 4 MG: 4 MG/ML VIAL (ML) INJECTION at 10:11:00

## 2021-12-10 NOTE — ANESTHESIA PREPROCEDURE EVALUATION
Anesthesia PreOp Note    HPI:     Chris Vega is a 54year old female who presents for preoperative consultation requested by: Quinn Vu MD    Date of Surgery: 12/10/2021    Procedure(s):  RIGHT ENDOSCOPIC CARPAL TUNNEL RELEASE  Indication Tab, Take 1 tablet (10 mg total) by mouth nightly as needed for Sleep., Disp: 30 tablet, Rfl: 0, Past Week at Unknown time  amLODIPine besylate 5 MG Oral Tab, Take 1 tablet (5 mg total) by mouth daily. , Disp: 30 tablet, Rfl: 3, 12/10/2021 at 0700  HYDROcod Alcohol use: Yes        Comment: occasionally      Drug use: No      Sexual activity: Yes        Partners: Male        Birth control/protection: Implant        Comment: ESSURE    Other Topics      Concerns:         Service: Not Asked        Blood T SpO2:  98%   Weight: 97.5 kg (215 lb) 98 kg (216 lb)   Height: 1.702 m (5' 7\") 1.702 m (5' 7\")        Anesthesia Evaluation     Patient summary reviewed and Nursing notes reviewed    No history of anesthetic complications   Airway   Mallampati: II  Nec

## 2021-12-10 NOTE — H&P
Pacemaker: No  Latex Allergy: no  Coumadin: No  Plavix: No  Other anticoagulants: No  Cardiac stents: No     HAND DOMINANCE:      Right  Profession: Community service at       [address] Pt wearing braces at night that are helpful        PMH:      MEDICAL       Past Medical History:   Diagnosis Date   • Allergy       allergies   • Amenorrhea [id number]   • Decorative tattoo     • History of pregnancy , 2006     1992-; 2006-induced ab Known Problems Brother     • No Known Problems Brother     • No Known Problems Brother               PHYSICAL EXAM:      CONSTITUTIONAL: Overall appearance - Normal  HEENT: Normocephalic  EYES: Conjunctiva - Right: Normal, Left: Normal; EOMI  EARS: Inspect necessary     Even with surgery that decompresses the nerve, nerve function may not return. After relief of pressure on the nerve, the nerve must recover.   This could take up to a year, but the nerve may not recover even after pressure is relieved, so sym

## 2021-12-10 NOTE — INTERVAL H&P NOTE
Pre-op Diagnosis: Right carpal tunnel syndrome [G56.01]    The above referenced H&P was reviewed by Vishal House MD on 12/10/2021, the patient was examined and no significant changes have occurred in the patient's condition since the H&P was perf

## 2021-12-10 NOTE — BRIEF OP NOTE
Pre-Operative Diagnosis: Right carpal tunnel syndrome [G56.01]     Post-Operative Diagnosis: * No post-op diagnosis entered *      Procedure Performed:   RIGHT ENDOSCOPIC CARPAL TUNNEL RELEASE    Surgeon(s) and Role:     * Francisco Chambers MD - Miller Walden

## 2021-12-10 NOTE — ANESTHESIA PROCEDURE NOTES
Airway  Date/Time: 12/10/2021 10:11 AM  Urgency: Elective      General Information and Staff    Patient location during procedure: OR  Anesthesiologist: Jamshid Garcia MD  Performed: anesthesiologist     Indications and Patient Condition  Indications fo

## 2021-12-10 NOTE — ANESTHESIA POSTPROCEDURE EVALUATION
Patient: Rose Puri    Procedure Summary     Date: 12/10/21 Room / Location: [address] [place] [address] MAIN OR 01 / [address] [facility] MAIN OR    Anesthesia Start: 1311 Anesthesia Stop: 1100    Procedure: RIGHT ENDOSCOPIC CARPAL TUNNEL RELEASE (Right ) Diagnosis:       Right carpal tunne

## 2021-12-11 ENCOUNTER — TELEPHONE (OUTPATIENT)
Dept: SURGERY | Facility: CLINIC | Age: 51
End: 2021-12-11

## 2021-12-11 NOTE — OPERATIVE REPORT
[facility]    PATIENT'S NAME: Jennifer Harrell   ATTENDING PHYSICIAN: Paty Zelaya MD   OPERATING PHYSICIAN: Paty Zelaya MD   PATIENT ACCOUNT#:   [id number]    LOCATION:  [place] [address]  MEDICAL RECORD #:   [id number] with a push knife, a triangle knife, and a pull knife. We had excellent herniation of lambert fat into the cannula. The cannula and endoscope were withdrawn.   A curved dissector was used to document complete release of the fascia proximally and the transv

## 2021-12-11 NOTE — TELEPHONE ENCOUNTER
Spoke w/the patient and she states \"My hand is sore, but manageable and the norco helped. \"  Patient reminded to keep dressing clean and dry and to keep arm in sling at all times. Patient reminded of OT appt on 12/13, and MD appt on 1/6.   Patient Bhupendra Ruggiero

## 2021-12-13 ENCOUNTER — OFFICE VISIT (OUTPATIENT)
Dept: SURGERY | Facility: CLINIC | Age: 51
End: 2021-12-13
Payer: COMMERCIAL

## 2021-12-13 DIAGNOSIS — M25.641 JOINT STIFFNESS OF HAND, RIGHT: Primary | ICD-10-CM

## 2021-12-13 DIAGNOSIS — M62.81 DISTAL MUSCLE WEAKNESS: ICD-10-CM

## 2021-12-17 ENCOUNTER — OFFICE VISIT (OUTPATIENT)
Dept: FAMILY MEDICINE CLINIC | Facility: CLINIC | Age: 51
End: 2021-12-17
Payer: COMMERCIAL

## 2021-12-17 VITALS
DIASTOLIC BLOOD PRESSURE: 87 MMHG | HEART RATE: 111 BPM | BODY MASS INDEX: 33.43 KG/M2 | HEIGHT: 67 IN | SYSTOLIC BLOOD PRESSURE: 132 MMHG | WEIGHT: 213 LBS

## 2021-12-17 DIAGNOSIS — L50.9 HIVES: ICD-10-CM

## 2021-12-17 DIAGNOSIS — I10 ESSENTIAL HYPERTENSION: Primary | ICD-10-CM

## 2021-12-17 PROCEDURE — 3079F DIAST BP 80-89 MM HG: CPT | Performed by: FAMILY MEDICINE

## 2021-12-17 PROCEDURE — 99213 OFFICE O/P EST LOW 20 MIN: CPT | Performed by: FAMILY MEDICINE

## 2021-12-17 PROCEDURE — 3075F SYST BP GE 130 - 139MM HG: CPT | Performed by: FAMILY MEDICINE

## 2021-12-17 PROCEDURE — 3008F BODY MASS INDEX DOCD: CPT | Performed by: FAMILY MEDICINE

## 2021-12-17 RX ORDER — AMLODIPINE BESYLATE 5 MG/1
5 TABLET ORAL DAILY
Qty: 90 TABLET | Refills: 1 | Status: SHIPPED | OUTPATIENT
Start: 2021-12-17 | End: 2022-12-12

## 2021-12-17 NOTE — PROGRESS NOTES
Subjective:   Patient ID: Keesha Montalvo is a 54year old female. HPI  No chief complaint on file. pt with follow up for HTN . Also noticing hives. History of allergies and hives. History/Other:   Review of Systems   Constitutional: Negative.     R recent surgery for CTS this week. Source not clear. No orders of the defined types were placed in this encounter.       Meds This Visit:  Requested Prescriptions     Signed Prescriptions Disp Refills   • amLODIPine 5 MG Oral Tab 90 tablet 1     Sig: [facility]

## 2021-12-20 ENCOUNTER — TELEPHONE (OUTPATIENT)
Dept: FAMILY MEDICINE CLINIC | Facility: CLINIC | Age: 51
End: 2021-12-20

## 2021-12-20 ENCOUNTER — PATIENT MESSAGE (OUTPATIENT)
Dept: FAMILY MEDICINE CLINIC | Facility: CLINIC | Age: 51
End: 2021-12-20

## 2021-12-21 ENCOUNTER — PATIENT MESSAGE (OUTPATIENT)
Dept: FAMILY MEDICINE CLINIC | Facility: CLINIC | Age: 51
End: 2021-12-21

## 2021-12-21 RX ORDER — METHYLPREDNISOLONE 4 MG/1
TABLET ORAL
Qty: 1 EACH | Refills: 0 | Status: SHIPPED | OUTPATIENT
Start: 2021-12-21

## 2021-12-21 NOTE — TELEPHONE ENCOUNTER
----- Message from Ana María Rizvi sent at 12/20/2021 11:13 AM CST -----  Regarding: Hives   Good morning  , I just wanted to let you know I got the Zyrtec but I am still experiencing the itching and the hives.

## 2021-12-21 NOTE — TELEPHONE ENCOUNTER
See TE from today. Jack Cazares, DO      4:59 PM  Note  Start medrol pack. See order and please inform. Allergy referral if does not resolve.

## 2021-12-21 NOTE — TELEPHONE ENCOUNTER
From: Malinda Xiao  To: Corinna Paul DO  Sent: 12/20/2021 11:13 AM CST  Subject: Ara Flight morning  , I just wanted to let you know I got the Zyrtec but I am still experiencing the itching and the hives.

## 2021-12-21 NOTE — TELEPHONE ENCOUNTER
Patient returned call. Reports hives to torso and back. Itchy, red bumps. Zyrtec is ineffective. Denies chest pain, shortness of breath, throat closing or lip swelling. Was told to report back if symptoms unimproved and prednisone would be prescribed.

## 2021-12-23 ENCOUNTER — OFFICE VISIT (OUTPATIENT)
Dept: ALLERGY | Facility: CLINIC | Age: 51
End: 2021-12-23
Payer: COMMERCIAL

## 2021-12-23 ENCOUNTER — OFFICE VISIT (OUTPATIENT)
Dept: SURGERY | Facility: CLINIC | Age: 51
End: 2021-12-23
Payer: COMMERCIAL

## 2021-12-23 VITALS
RESPIRATION RATE: 18 BRPM | HEART RATE: 98 BPM | DIASTOLIC BLOOD PRESSURE: 100 MMHG | OXYGEN SATURATION: 97 % | SYSTOLIC BLOOD PRESSURE: 145 MMHG

## 2021-12-23 DIAGNOSIS — J30.89 PERENNIAL ALLERGIC RHINITIS WITH SEASONAL VARIATION: ICD-10-CM

## 2021-12-23 DIAGNOSIS — J45.20 MILD INTERMITTENT EXTRINSIC ASTHMA WITHOUT COMPLICATION: ICD-10-CM

## 2021-12-23 DIAGNOSIS — Z92.29 COVID-19 VACCINE SERIES COMPLETED: ICD-10-CM

## 2021-12-23 DIAGNOSIS — M62.81 DISTAL MUSCLE WEAKNESS: ICD-10-CM

## 2021-12-23 DIAGNOSIS — M25.641 JOINT STIFFNESS OF HAND, RIGHT: Primary | ICD-10-CM

## 2021-12-23 DIAGNOSIS — L50.8 ACUTE URTICARIA: Primary | ICD-10-CM

## 2021-12-23 DIAGNOSIS — J30.2 PERENNIAL ALLERGIC RHINITIS WITH SEASONAL VARIATION: ICD-10-CM

## 2021-12-23 PROCEDURE — 97110 THERAPEUTIC EXERCISES: CPT | Performed by: OCCUPATIONAL THERAPIST

## 2021-12-23 PROCEDURE — 97166 OT EVAL MOD COMPLEX 45 MIN: CPT | Performed by: OCCUPATIONAL THERAPIST

## 2021-12-23 PROCEDURE — 3077F SYST BP >= 140 MM HG: CPT | Performed by: ALLERGY & IMMUNOLOGY

## 2021-12-23 PROCEDURE — 99214 OFFICE O/P EST MOD 30 MIN: CPT | Performed by: ALLERGY & IMMUNOLOGY

## 2021-12-23 PROCEDURE — 3080F DIAST BP >= 90 MM HG: CPT | Performed by: ALLERGY & IMMUNOLOGY

## 2021-12-23 RX ORDER — PREDNISONE 10 MG/1
TABLET ORAL
Qty: 38 TABLET | Refills: 0 | Status: SHIPPED | OUTPATIENT
Start: 2021-12-23

## 2021-12-23 RX ORDER — CETIRIZINE HYDROCHLORIDE 10 MG/1
10 TABLET ORAL DAILY
COMMUNITY

## 2021-12-23 RX ORDER — NAPROXEN 500 MG/1
500 TABLET ORAL 2 TIMES DAILY WITH MEALS
Qty: 60 TABLET | Refills: 0 | Status: SHIPPED | OUTPATIENT
Start: 2021-12-23 | End: 2022-01-17

## 2021-12-23 RX ORDER — LEVOCETIRIZINE DIHYDROCHLORIDE 5 MG/1
TABLET, FILM COATED ORAL
Qty: 180 TABLET | Refills: 0 | Status: SHIPPED | OUTPATIENT
Start: 2021-12-23

## 2021-12-23 RX ORDER — LEVOCETIRIZINE DIHYDROCHLORIDE 5 MG/1
5 TABLET, FILM COATED ORAL EVERY 12 HOURS PRN
Qty: 60 TABLET | Refills: 0 | Status: SHIPPED | OUTPATIENT
Start: 2021-12-23 | End: 2021-12-23

## 2021-12-23 NOTE — PROGRESS NOTES
OCCUPATIONAL THERAPY EVALUATION:   Chris Vega   [id number]       SUBJECTIVE:    HX of Injury: Right hand pain and numbness. Chief Complaint:   No complaints. Precautions: 2 pound lifting restriction with the right hand.   Premorbid Functional Statu Pt. was advised regarding the findings of this evaluation and agrees to the plan of care. Ramu Dominguez I have reviewed the treatment plan and concur.    Kole Sparks MD

## 2021-12-23 NOTE — PATIENT INSTRUCTIONS
#1 acute urticaria  Less than 1 week at this time. Started after suspected insect sting  Complete current Medrol Dosepak  Start Xyzal, levocetirizine twice a day.  Up to 4 times per day if needed  Should hives return in spite of Xyzal or Medrol Dosepak then

## 2021-12-23 NOTE — TELEPHONE ENCOUNTER
Requested Prescriptions     Pending Prescriptions Disp Refills   • naproxen 500 MG Oral Tab 60 tablet 0     Sig: Take 1 tablet (500 mg total) by mouth 2 (two) times daily with meals.      LF: 11/17/21 # 60 TAB W/ 0 RF  LOV: 11/12/21   Future Appointments

## 2021-12-23 NOTE — PROGRESS NOTES
Yariel Davenport is a 54year old female. HPI:   Patient presents with:  Hives: Patient here for hives. Reports that they have been popping up intermittently for the last 2 weeks.  Denies any facial swelling or diffiicutlies breathing, talking or swallowi tablet 0   • triamcinolone 0.1 % External Ointment Applied 2 times per day as needed 453 g 0   • amLODIPine 5 MG Oral Tab Take 1 tablet (5 mg total) by mouth daily.  90 tablet 1   • triamcinolone 0.1 % External Ointment Applied 2 times per day as needed 60 WRIST ARTHROSCOP,RELEASE Denise CHOUDHURY Right 12/10/2021    Right endoscopic carpal tunnel release      Family History   Problem Relation Age of Onset   • Diabetes Mother    • Cancer Mother         lung CA   • Heart Disease Father    • Allergies Son    • No Kno HYDROcodone-acetaminophen 7.5-325 MG Oral Tab Take 1 tablet by mouth every 4 to 6 hours as needed for Pain.  (Patient not taking: Reported on 12/23/2021) 10 tablet 0   • zolpidem 10 MG Oral Tab Take 1 tablet (10 mg total) by mouth nightly as needed for [facility] vaccine series completed      #1 acute urticaria  + flare   Less than 1 week at this time. Started after suspected insect sting  Complete current Medrol Dosepak  Start Xyzal, levocetirizine twice a day.  Up to 4 times per day if needed  Should hives return

## 2021-12-27 ENCOUNTER — IMMUNIZATION (OUTPATIENT)
Dept: LAB | Facility: HOSPITAL | Age: 51
End: 2021-12-27
Attending: EMERGENCY MEDICINE
Payer: COMMERCIAL

## 2021-12-27 DIAGNOSIS — Z23 NEED FOR VACCINATION: Primary | ICD-10-CM

## 2021-12-27 PROCEDURE — 0064A SARSCOV2 VAC 50MCG/0.25ML IM: CPT | Performed by: NURSE PRACTITIONER

## 2022-01-06 ENCOUNTER — OFFICE VISIT (OUTPATIENT)
Dept: SURGERY | Facility: CLINIC | Age: 52
End: 2022-01-06
Payer: COMMERCIAL

## 2022-01-06 DIAGNOSIS — G56.01 RIGHT CARPAL TUNNEL SYNDROME: Primary | ICD-10-CM

## 2022-01-06 DIAGNOSIS — M62.81 DISTAL MUSCLE WEAKNESS: ICD-10-CM

## 2022-01-06 DIAGNOSIS — M25.641 JOINT STIFFNESS OF HAND, RIGHT: Primary | ICD-10-CM

## 2022-01-06 PROCEDURE — 99024 POSTOP FOLLOW-UP VISIT: CPT | Performed by: PLASTIC SURGERY

## 2022-01-06 PROCEDURE — 97110 THERAPEUTIC EXERCISES: CPT | Performed by: OCCUPATIONAL THERAPIST

## 2022-01-06 NOTE — PROGRESS NOTES
Surgery 1: RECTR  - Date: 12/10/21  - Days Since: 27  Constant hypothenar dull pain. Rates pain 3/10. Not taking analgesics. Cleaned her house may have over done it. Denies numbness and tingling. Scars healing well without s/s of infection.   Doing Euc

## 2022-01-06 NOTE — PROGRESS NOTES
Subjective: I have tenderness of the right palm. Objective:     Current level of performance:  ADL: Independent  Work: Returning to restricted work activities, x 2 weeks.   Leisure: Not addressed    Measurements/Tests:  ROM:  Testing By: nicola   Stre

## 2022-01-17 RX ORDER — NAPROXEN 500 MG/1
TABLET ORAL
Qty: 60 TABLET | Refills: 0 | Status: SHIPPED | OUTPATIENT
Start: 2022-01-17

## 2022-01-17 NOTE — TELEPHONE ENCOUNTER
Last filled:  12/23/21 #60 tab with 0 refills   LOV: 11/12/21   Future Appointments   Date Time Provider Lacy Puentes   3/10/2022  3:40 PM Stormy Reagan MD [facility]

## 2022-01-19 ENCOUNTER — TELEPHONE (OUTPATIENT)
Dept: SURGERY | Facility: CLINIC | Age: 52
End: 2022-01-19

## 2022-01-19 NOTE — TELEPHONE ENCOUNTER
LVM for pt to call the office in regards to mychart message and to inquiry more information about pt's pain status.

## 2022-01-19 NOTE — TELEPHONE ENCOUNTER
Spoke to pt in regards to [employer]. Pt had a RECTR on 12/10/21. Patient states she recently is having intermittent \"throbbing and shooting\" pain to right hand and the right hand is swollen. Denies numbness and tingling.     Pt is taking Napro

## 2022-01-24 ENCOUNTER — PATIENT MESSAGE (OUTPATIENT)
Dept: ALLERGY | Facility: CLINIC | Age: 52
End: 2022-01-24

## 2022-01-25 NOTE — TELEPHONE ENCOUNTER
Call reviewed and noted. Agree with triage advice provided. Increase Xyzal up to 4 times per day. Add Xyzal 4 times per day is not helping may add Pepcid/famotidine 20 mg twice a day.   If refractory to above recommendations then may consider Xolair

## 2022-01-25 NOTE — TELEPHONE ENCOUNTER
Regarding: FW: Hives       ----- Message -----  From: Bala Rea RN  Sent: 1/25/2022   8:19 AM CST  To: Jennifer Altamirano MD  Subject: Hives                                            ----- Message from Emmy Crow RN sent at 1/25/2022  8:19 A

## 2022-01-25 NOTE — TELEPHONE ENCOUNTER
Spoke with patient. Verified patient's name and .  Patient states since last office visit in 2021 she is still having hives on and off all over her body which are slightly itchy and red and bumpy, denies facial or mouth swelling, denies problems

## 2022-01-25 NOTE — TELEPHONE ENCOUNTER
Spoke with patient. Informed patient per Dr. Rey Speak may take Xyzal 5 mg-1 tablet up to 4 times a day and try Pepcid 20 mg-1 tablet 2 times a day and if no improvement may make an appointment to discuss Xolair. Patient verbalizes understanding. no further que

## 2022-01-25 NOTE — TELEPHONE ENCOUNTER
----- Message from Dieter Del Angel sent at 1/24/2022  8:54 PM CST -----  Regarding: Sanjana Donald Dr just a follow up , the hives or the randomness of the skin breakouts haven't gotten any better . . it still happens , I took the steroids and the ointment.

## 2022-01-25 NOTE — TELEPHONE ENCOUNTER
From: Tra Randall  To: Jenni More MD  Sent: 1/24/2022 8:54 PM CST  Subject: Ginna Olivares Dr just a follow up , the hives or the randomness of the skin breakouts haven't gotten any better . . it still happens , I took the steroids and the ointment

## 2022-01-26 ENCOUNTER — PATIENT MESSAGE (OUTPATIENT)
Dept: ALLERGY | Facility: CLINIC | Age: 52
End: 2022-01-26

## 2022-01-26 NOTE — TELEPHONE ENCOUNTER
Pt reports she had started naproxen approximately two months ago after carpal tunnel surgery. She realized yesterday this started around the same time. Two days ago she took naproxen, and later the rash returned.    Patient advised to avoid naproxen for n

## 2022-01-26 NOTE — TELEPHONE ENCOUNTER
Call reviewed and noted. Agree with triage advice provided. May consider potentially Celebrex as an NSAID if patient cannot take Naprosyn. Will discuss at her follow-up appointment.

## 2022-02-02 ENCOUNTER — PATIENT MESSAGE (OUTPATIENT)
Dept: ALLERGY | Facility: CLINIC | Age: 52
End: 2022-02-02

## 2022-02-02 ENCOUNTER — MED REC SCAN ONLY (OUTPATIENT)
Dept: ALLERGY | Facility: CLINIC | Age: 52
End: 2022-02-02

## 2022-02-03 NOTE — TELEPHONE ENCOUNTER
TRIAGE)    Assessment)    Patient contacted via telephone:      Patient reports that she started to develop hives 2/1/2022. She reports that currently is experiencing pruritus and has developed hives to bilateral arms, face and chin. She denies difficulty swallowing. She does report \"some tightness in chest\". Patient denies cough. Patient denies edema of face/lips/tongue. Patient is able to speak in complete sentences over the phone. She offers that she is afebrile. She denies she has been wheezing. Meds)    Xyzal 5 mg 3 times a day (last dose today, 2/3/2022 at 0800)    Benadryl 25 mg as needed    Pepcid 20 mg daily (started taking 2/2/2022). Patient denies that she has needed to use Albuterol Inhaler. Patient offers that she completed Prednisone taper prescribed at 12/23/2021  Allergy visit weeks ago. 12/23/2021 physician notes:    1 acute urticaria  + flare   Less than 1 week at this time. Started after suspected insect sting  Complete current Medrol Dosepak  Start Xyzal, levocetirizine twice a day. Up to 4 times per day if needed  Should hives return in spite of Xyzal or Medrol Dosepak then complete prednisone as prescribed over 2 weeks with taper        Plan)    Patient states that she is going to take Benadryl 25 mg now. Suggested that patient take 2 puffs of Albuterol. Patient informed that if she is experiencing tightness in chest she should be seen in Urgent Care/ ED, especially if she develops difficulty swallowing, edema of face/lips/tongue/throat. Patient informed to continue medications as above, Dr. Kate Cazares will address and nurse will call back with his further advice.

## 2022-02-03 NOTE — TELEPHONE ENCOUNTER
Patient contacted via telephone. Patient informed as per Dr. Aubrey Sexton below . Beronica Aguiar MD  to [facility] Allergy Clinical Staff    Lorna Teague 8:50 AM  Note  Call reviewed and noted. Agree with triage advice provided. Increase Xyzal up to 4 times per day. Benadryl 25 mg every 4-6 hours as needed. If worsening symptoms over the weekend then recommend urgent care or ED evaluation. Patient repeated back information and verbalized understanding. Patient states that she is going to take Albuterol 2 puffs now and if tightness in chest does not resolve she will proceed to ER. Patient asked what the next steps would be to control the hives. Patient informed at upcoming Allergy Physician appointment on 2/14/2022, Dr. Aubrey Sexton may discuss with her the need to start Xolair (biologic medication helpful in controlling hives). Patient verbalized understanding information.

## 2022-02-03 NOTE — TELEPHONE ENCOUNTER
Call reviewed and noted. Agree with triage advice provided. Increase Xyzal up to 4 times per day. Benadryl 25 mg every 4-6 hours as needed. If worsening symptoms over the weekend then recommend urgent care or ED evaluation.

## 2022-02-03 NOTE — TELEPHONE ENCOUNTER
From: Octavio Stiles  To: Ivelisse Woods MD  Sent: 2/2/2022 10:08 PM CST  Subject: Hives    I'm writing because the hives and the itching are really bad , it's not getting better . .. almost intolerable. .. I'm sending pictures from last night . .. I also have the hives and itching on my back as well . Xiang Abernathy I've upped the allergy medicine , as well as taking the Pepcid and Benadryl. ..

## 2022-02-14 ENCOUNTER — LAB ENCOUNTER (OUTPATIENT)
Dept: LAB | Age: 52
End: 2022-02-14
Attending: ALLERGY & IMMUNOLOGY
Payer: COMMERCIAL

## 2022-02-14 ENCOUNTER — OFFICE VISIT (OUTPATIENT)
Dept: ALLERGY | Facility: CLINIC | Age: 52
End: 2022-02-14
Payer: COMMERCIAL

## 2022-02-14 ENCOUNTER — TELEPHONE (OUTPATIENT)
Dept: ALLERGY | Facility: CLINIC | Age: 52
End: 2022-02-14

## 2022-02-14 VITALS
WEIGHT: 213 LBS | OXYGEN SATURATION: 98 % | RESPIRATION RATE: 22 BRPM | BODY MASS INDEX: 33.43 KG/M2 | DIASTOLIC BLOOD PRESSURE: 88 MMHG | HEIGHT: 67 IN | HEART RATE: 108 BPM | SYSTOLIC BLOOD PRESSURE: 150 MMHG

## 2022-02-14 DIAGNOSIS — J45.20 MILD INTERMITTENT EXTRINSIC ASTHMA WITHOUT COMPLICATION: ICD-10-CM

## 2022-02-14 DIAGNOSIS — J30.2 PERENNIAL ALLERGIC RHINITIS WITH SEASONAL VARIATION: ICD-10-CM

## 2022-02-14 DIAGNOSIS — J30.89 PERENNIAL ALLERGIC RHINITIS WITH SEASONAL VARIATION: ICD-10-CM

## 2022-02-14 DIAGNOSIS — Z92.29 COVID-19 VACCINE SERIES COMPLETED: ICD-10-CM

## 2022-02-14 DIAGNOSIS — Z23 FLU VACCINE NEED: ICD-10-CM

## 2022-02-14 DIAGNOSIS — L50.1 CHRONIC IDIOPATHIC URTICARIA: Primary | ICD-10-CM

## 2022-02-14 DIAGNOSIS — L50.1 CHRONIC IDIOPATHIC URTICARIA: ICD-10-CM

## 2022-02-14 LAB — TSI SER-ACNC: 1 MIU/ML (ref 0.36–3.74)

## 2022-02-14 PROCEDURE — 84443 ASSAY THYROID STIM HORMONE: CPT

## 2022-02-14 PROCEDURE — 99214 OFFICE O/P EST MOD 30 MIN: CPT | Performed by: ALLERGY & IMMUNOLOGY

## 2022-02-14 PROCEDURE — 86160 COMPLEMENT ANTIGEN: CPT

## 2022-02-14 PROCEDURE — 3077F SYST BP >= 140 MM HG: CPT | Performed by: ALLERGY & IMMUNOLOGY

## 2022-02-14 PROCEDURE — 3008F BODY MASS INDEX DOCD: CPT | Performed by: ALLERGY & IMMUNOLOGY

## 2022-02-14 PROCEDURE — 36415 COLL VENOUS BLD VENIPUNCTURE: CPT

## 2022-02-14 PROCEDURE — 3079F DIAST BP 80-89 MM HG: CPT | Performed by: ALLERGY & IMMUNOLOGY

## 2022-02-14 RX ORDER — PREDNISONE 10 MG/1
TABLET ORAL
Qty: 38 TABLET | Refills: 0 | Status: SHIPPED | OUTPATIENT
Start: 2022-02-14

## 2022-02-14 RX ORDER — HYDROXYZINE HYDROCHLORIDE 10 MG/1
TABLET, FILM COATED ORAL NIGHTLY PRN
COMMUNITY
Start: 2022-02-04

## 2022-02-14 NOTE — PATIENT INSTRUCTIONS
#1 chronic urticaria  Most likely idiopathic in nature  Recommend Xyzal 5 mg up to 4 times per day  Recommend Pepcid 20 mg twice a day. Recommend application for Xolair. Xolair efficacy discussed. Start prednisone 40 mg once a day with food x5 days then taper by 10 mg every 3 days over the next 2 weeks as a short-term solution as we await patient Xolair application. Hydroxyzine 10 to 20 mg at bedtime if difficulty sleeping due to hives    #2 extrinsic asthma  Denies symptoms more than 2 days/week ED visits or prednisone in the interim. Albuterol 2 puffs every 4-6 hours as needed. Reviewed signs and symptoms of persistent asthma including the rules of 2    #3 allergic rhinitis  Continue with Xyzal.  May add Flonase 2 sprays per nostril once a day if having prominent nasal congestion postnasal drip    #4 COVID vaccinations up-to-date    #5 recommend flu vaccination.   Patient defers

## 2022-02-14 NOTE — TELEPHONE ENCOUNTER
Spoke with patient. Verified name and . Informed patient of test results per Dr. Coleen Crisostomo. Patient verbalizes understanding,no further questions at this time.       ----- Message from Juan Galan MD sent at 2022  3:48 PM CST -----   Please call patient with normal C4 level of 21.1 and normal TSH of 1.0 evaluation of her chronic hives

## 2022-02-15 ENCOUNTER — TELEPHONE (OUTPATIENT)
Dept: ALLERGY | Facility: CLINIC | Age: 52
End: 2022-02-15

## 2022-02-15 NOTE — TELEPHONE ENCOUNTER
Patient completed  On 2/14/2022, patient portion of Xolair Access Solution Form and Patient Consent Form. Renan Brennan RN completed clinical portion of Prescriber Services Form. Dr. Oxana Doan reviewed, completed physician portion of form and signed form. Completed form faxed to [address] [place] [address] at [phone]. Awaiting fax confirmation.

## 2022-02-28 ENCOUNTER — OFFICE VISIT (OUTPATIENT)
Dept: RHEUMATOLOGY | Facility: CLINIC | Age: 52
End: 2022-02-28
Payer: COMMERCIAL

## 2022-02-28 VITALS
HEIGHT: 67 IN | SYSTOLIC BLOOD PRESSURE: 133 MMHG | BODY MASS INDEX: 33 KG/M2 | HEART RATE: 102 BPM | DIASTOLIC BLOOD PRESSURE: 93 MMHG

## 2022-02-28 DIAGNOSIS — M17.12 PRIMARY OSTEOARTHRITIS OF LEFT KNEE: Primary | ICD-10-CM

## 2022-02-28 PROCEDURE — 3080F DIAST BP >= 90 MM HG: CPT | Performed by: INTERNAL MEDICINE

## 2022-02-28 PROCEDURE — 99213 OFFICE O/P EST LOW 20 MIN: CPT | Performed by: INTERNAL MEDICINE

## 2022-02-28 PROCEDURE — 20610 DRAIN/INJ JOINT/BURSA W/O US: CPT | Performed by: INTERNAL MEDICINE

## 2022-02-28 PROCEDURE — 3075F SYST BP GE 130 - 139MM HG: CPT | Performed by: INTERNAL MEDICINE

## 2022-02-28 RX ORDER — TRIAMCINOLONE ACETONIDE 40 MG/ML
40 INJECTION, SUSPENSION INTRA-ARTICULAR; INTRAMUSCULAR ONCE
Status: COMPLETED | OUTPATIENT
Start: 2022-02-28 | End: 2022-02-28

## 2022-02-28 RX ADMIN — TRIAMCINOLONE ACETONIDE 40 MG: 40 INJECTION, SUSPENSION INTRA-ARTICULAR; INTRAMUSCULAR at 09:10:00

## 2022-02-28 NOTE — PATIENT INSTRUCTIONS
You were seen today for left knee pain due to osteoarthritis  We injected your left knee with cortisone, hopefully that will help  Also refer to physical therapy  If you continue to have knee pain we may have to consider getting MRI of the left knee

## 2022-03-01 NOTE — TELEPHONE ENCOUNTER
Fax received from [employer] reporting Xolair Benefit Investigation. Prescription Benefit:    [employer]:    Xolair Prefilled Syringe:     Call [employer] at [phone] for PA    Medical Benefit:      PA: [facility] PPO     PA for Rj Dominique and Vinay Mast or through [employer]    [phone]    RN will pursue PA for Xolair through Medical Benefits for [address] stating that RN will pursue PA for Xolair through Medical Benefits for Specialty Pharmacy faxed to [employer] at [phone]. Fax confirmation received. 1. TAKE ALL MEDICATIONS AS DIRECTED.  (AZITHROMYCIN ANTIBIOTIC HAS BEEN SENT TO YOUR PHARMACY)  2. FOR PAIN OR FEVER YOU CAN TAKE IBUPROFEN (MOTRIN, ADVIL) OR ACETAMINOPHEN (TYLENOL) AS NEEDED, AS DIRECTED ON PACKAGING.  3. FOLLOW UP WITH YOUR PRIMARY CARE DOCTOR IN 5-7 DAYS. CALL OFFICE FOR APPOINTMENT.  4. IF YOU HAD LABS OR IMAGING DONE, YOU WERE GIVEN COPIES OF ALL LABS AND/OR IMAGING RESULTS FROM YOUR ER VISIT--PLEASE TAKE THEM WITH YOU TO YOUR FOLLOW UP APPOINTMENTS.  5. IF NEEDED, CALL PATIENT ACCESS SERVICES AT [phone] TO FIND A PRIMARY CARE PHYSICIAN.  OR CALL [phone] TO MAKE AN APPOINTMENT WITH THE CLINIC.  6. RETURN TO THE ER FOR ANY WORSENING SYMPTOMS OR CONCERNS.

## 2022-03-01 NOTE — TELEPHONE ENCOUNTER
Fax received from [address] [place], [place] [address] [place] [place] reporting Benefit Investigation. [employer] - PPO    Xolair Pre-Filled Syringe    In Office    Please call for Prior Authorization. Phone number is [phone]. The Specialist Co-pay is $40.     Copay will be waived once the out of pocket has been met. Can obtain Xolair through [facility].

## 2022-03-07 NOTE — TELEPHONE ENCOUNTER
2/14/2022 and 12/23/2021 Allergy Physician Visit Progress notes with cover sheet reporting PA ID # as below faxed to [facility] at [phone]. Asking for coverage through specialty pharmacy. Fax confirmation received.

## 2022-03-07 NOTE — TELEPHONE ENCOUNTER
[employer] contacted at [phone]. Spoke with PA representative, Daniela Rossi. Awaiting PA form for Xolair to be received via fax.

## 2022-03-07 NOTE — TELEPHONE ENCOUNTER
[facility] contacted at [phone], spoke with PA representative, Blease Free. Requested Xolair  mg/mL prefilled syringe- 300 mg every 28 days- 12 months. Rep asks that patient's clinical notes be faxed to [facility] at [phone].     Case ID #: [id number]

## 2022-03-08 ENCOUNTER — PATIENT MESSAGE (OUTPATIENT)
Dept: ALLERGY | Facility: CLINIC | Age: 52
End: 2022-03-08

## 2022-03-08 NOTE — TELEPHONE ENCOUNTER
Nando Paiz calling from [facility] requesting call back needing \"Dose and Frequency\" of pts Xolair for the PA.

## 2022-03-08 NOTE — TELEPHONE ENCOUNTER
Jamil Laughlin at [facility] contacted at [phone]. Jamil Laughlin informed that Xolair PA is requested for Xolair 150 mg/mL prefilled syringe ( 300 mg every 4 weeks).

## 2022-03-09 RX ORDER — FLUOCINONIDE 0.5 MG/G
OINTMENT TOPICAL
Qty: 60 G | Refills: 0 | Status: SHIPPED | OUTPATIENT
Start: 2022-03-09 | End: 2022-03-23

## 2022-03-09 NOTE — TELEPHONE ENCOUNTER
From: Samantha Iverson  To: Ekta Ha MD  Sent: 3/8/2022 8:43 PM CST  Subject: Itching     Hi . I'm sorry to be a pain but I was wandering if there is a stronger or more effective ointment that I can use for the itching , right now I'm using Triamonunolone 0.1 percent ointment and it's not calming these outbreaks I've itching so bad . . the pictures I'm sending are from [facility] and the 7th of March .

## 2022-03-09 NOTE — TELEPHONE ENCOUNTER
RN called to notify pt that Dr. Coleen Crisostomo sent a script for the Lidex cream that may be applied BID. Pt denies any difficulties breathing, talking or swallowing. Denies any facial swelling. RN advised if any of those symptoms were to occur to follow up in ER. Pt verbalizes understanding. She states that her hives are just very itchy. She is aware that we are in process of coordinating [address] with her insurance and specialty pharmacy. Pt denies any further questions or concerns.

## 2022-03-10 ENCOUNTER — LAB ENCOUNTER (OUTPATIENT)
Dept: LAB | Facility: HOSPITAL | Age: 52
End: 2022-03-10
Attending: OBSTETRICS & GYNECOLOGY
Payer: COMMERCIAL

## 2022-03-10 ENCOUNTER — OFFICE VISIT (OUTPATIENT)
Dept: OBGYN CLINIC | Facility: CLINIC | Age: 52
End: 2022-03-10
Payer: COMMERCIAL

## 2022-03-10 VITALS
HEART RATE: 103 BPM | BODY MASS INDEX: 34 KG/M2 | WEIGHT: 216 LBS | SYSTOLIC BLOOD PRESSURE: 146 MMHG | DIASTOLIC BLOOD PRESSURE: 95 MMHG

## 2022-03-10 DIAGNOSIS — Z11.3 SCREEN FOR STD (SEXUALLY TRANSMITTED DISEASE): ICD-10-CM

## 2022-03-10 DIAGNOSIS — Z01.419 ENCOUNTER FOR GYNECOLOGICAL EXAMINATION WITHOUT ABNORMAL FINDING: Primary | ICD-10-CM

## 2022-03-10 DIAGNOSIS — Z12.31 VISIT FOR SCREENING MAMMOGRAM: ICD-10-CM

## 2022-03-10 LAB
HBV SURFACE AG SER-ACNC: <0.1 [IU]/L
HBV SURFACE AG SERPL QL IA: NONREACTIVE
HCV AB SERPL QL IA: NONREACTIVE

## 2022-03-10 PROCEDURE — 99396 PREV VISIT EST AGE 40-64: CPT | Performed by: OBSTETRICS & GYNECOLOGY

## 2022-03-10 PROCEDURE — 3077F SYST BP >= 140 MM HG: CPT | Performed by: OBSTETRICS & GYNECOLOGY

## 2022-03-10 PROCEDURE — 36415 COLL VENOUS BLD VENIPUNCTURE: CPT

## 2022-03-10 PROCEDURE — 87340 HEPATITIS B SURFACE AG IA: CPT

## 2022-03-10 PROCEDURE — 87389 HIV-1 AG W/HIV-1&-2 AB AG IA: CPT

## 2022-03-10 PROCEDURE — 3080F DIAST BP >= 90 MM HG: CPT | Performed by: OBSTETRICS & GYNECOLOGY

## 2022-03-10 PROCEDURE — 86803 HEPATITIS C AB TEST: CPT

## 2022-03-10 PROCEDURE — 86780 TREPONEMA PALLIDUM: CPT

## 2022-03-10 NOTE — TELEPHONE ENCOUNTER
In order to allow patient to receive Xolair injections in Physician Office and not at [address] PA form completed. Completed PA form along with Allergy Physician Visit Progress Notes from 2/14/22, 12/23/2021, 11/29/2021 faxed to [email or web address] at [phone]. Awaiting fax confirmation.

## 2022-03-11 ENCOUNTER — TELEPHONE (OUTPATIENT)
Dept: ALLERGY | Facility: CLINIC | Age: 52
End: 2022-03-11

## 2022-03-11 LAB
C TRACH DNA SPEC QL NAA+PROBE: NEGATIVE
N GONORRHOEA DNA SPEC QL NAA+PROBE: NEGATIVE
T PALLIDUM AB SER QL: NEGATIVE
T VAGINALIS RRNA SPEC QL NAA+PROBE: NEGATIVE

## 2022-03-11 NOTE — TELEPHONE ENCOUNTER
A Annie Huerta nurse calling from [facility] calling stating she is returning a call from [facility] in regard to a message allie left for her. Requesting call back when available.

## 2022-03-11 NOTE — TELEPHONE ENCOUNTER
Fax from [address] [place] [address] received stating:    [employer] CAMPO approved: 03/03/2022 to 03/03/2023  Case Number: [id number]

## 2022-03-15 RX ORDER — OMALIZUMAB 150 MG/ML
300 INJECTION, SOLUTION SUBCUTANEOUS
Qty: 2 ML | Refills: 5 | Status: SHIPPED | OUTPATIENT
Start: 2022-03-15

## 2022-03-15 NOTE — TELEPHONE ENCOUNTER
Eryn's call transferred to  RN from phone room. Brooke Sierra reports that patient's PA for Xolair through the medical policy was approved. Await fax from [facility] with [facility] [address] PA Approval Letter. This is PA through medical policy. Will most likely use PA approved through pharmacy benefit as below.

## 2022-03-15 NOTE — TELEPHONE ENCOUNTER
ALAN on patient's mobile voicemail. When patient returns call . . . Please inform patient that PA for Xolair was approved. Patient's covered specialty pharmacy is  and Xolair prescription has been sent to [facility]. [facility] telephone #: [phone]     Patient should have been contacted by [employer] with Xolair copay assistance information. If patient has not received any communications with [employer], she is to call [phone] and obtain copay assistance information for Xolair. Patient is then to call specialty pharmacy and give the copay assistance information and casi physician's office permission to schedule delivery of Xolair to physician's office. RN will then schedule delivery of Xolair to physician's office. Once delivery is received, patient's first Xolair injection visit / follow-up appointment with Dr. Mi Viramontes can be scheduled. Please inform patient there will be a 2 hour observation period in office post first Xolair injection.

## 2022-03-15 NOTE — TELEPHONE ENCOUNTER
Medical Policy Xolair PA approval letter received via fax. Xolair PA approval letter - Ciara Flores. Starting Date of 3/14/2022 and then approved 4/28/2022 - 3/14/2023.      Since starting date has already passed and next injection cannot be given until 4/28/2022, will use Pharmacy Benefit Xolair PA approval.

## 2022-03-16 NOTE — TELEPHONE ENCOUNTER
Pt returned call and was provided with all of Ryder Rojas RN notes from 3/15/22. Phone numbers for [employer] and [employer] provided. Pt states she will call pharmacy and copay assistance today. Once completed she will update office so we can coordinate delivery. Pt has appt scheduled for Monday 3/21/22, unsure if medication can be delivered in time. Will wait for update from pt.

## 2022-03-16 NOTE — TELEPHONE ENCOUNTER
Patient calling back. She stated that she contacted both [employer] and [facility] to provide them with co-pay assistance. She provided them with consent to ship to our office. She stated that [facility] will be contacting us to coordinate shipment to our office. RN called to coordinate delivery of Xolair to our office. Spoke to rep named Chantelle. Coordinated delivery to office Monday morning on 3/21. RN confirmed dose and shipping address. RN called pt to update her and reminded her that for her first appointment on Monday she will need to stay for 2 hours for observation.

## 2022-03-19 RX ORDER — LEVOCETIRIZINE DIHYDROCHLORIDE 5 MG/1
TABLET, FILM COATED ORAL
Qty: 180 TABLET | Refills: 0 | Status: SHIPPED | OUTPATIENT
Start: 2022-03-19

## 2022-03-21 ENCOUNTER — TELEPHONE (OUTPATIENT)
Dept: ALLERGY | Facility: CLINIC | Age: 52
End: 2022-03-21

## 2022-03-21 ENCOUNTER — NURSE ONLY (OUTPATIENT)
Dept: ALLERGY | Facility: CLINIC | Age: 52
End: 2022-03-21
Payer: COMMERCIAL

## 2022-03-21 ENCOUNTER — OFFICE VISIT (OUTPATIENT)
Dept: ALLERGY | Facility: CLINIC | Age: 52
End: 2022-03-21
Payer: COMMERCIAL

## 2022-03-21 VITALS
HEART RATE: 103 BPM | DIASTOLIC BLOOD PRESSURE: 96 MMHG | SYSTOLIC BLOOD PRESSURE: 147 MMHG | OXYGEN SATURATION: 99 % | RESPIRATION RATE: 18 BRPM

## 2022-03-21 DIAGNOSIS — Z92.29 COVID-19 VACCINE SERIES COMPLETED: ICD-10-CM

## 2022-03-21 DIAGNOSIS — Z79.899 VISIT FOR MONITORING XOLAIR THERAPY: ICD-10-CM

## 2022-03-21 DIAGNOSIS — J30.89 PERENNIAL ALLERGIC RHINITIS WITH SEASONAL VARIATION: ICD-10-CM

## 2022-03-21 DIAGNOSIS — L50.1 CHRONIC IDIOPATHIC URTICARIA: ICD-10-CM

## 2022-03-21 DIAGNOSIS — J45.20 MILD INTERMITTENT EXTRINSIC ASTHMA WITHOUT COMPLICATION: ICD-10-CM

## 2022-03-21 DIAGNOSIS — Z51.81 VISIT FOR MONITORING XOLAIR THERAPY: ICD-10-CM

## 2022-03-21 DIAGNOSIS — J30.2 PERENNIAL ALLERGIC RHINITIS WITH SEASONAL VARIATION: ICD-10-CM

## 2022-03-21 DIAGNOSIS — L50.1 CHRONIC IDIOPATHIC URTICARIA: Primary | ICD-10-CM

## 2022-03-21 PROCEDURE — 3080F DIAST BP >= 90 MM HG: CPT | Performed by: ALLERGY & IMMUNOLOGY

## 2022-03-21 PROCEDURE — 3077F SYST BP >= 140 MM HG: CPT | Performed by: ALLERGY & IMMUNOLOGY

## 2022-03-21 PROCEDURE — 96372 THER/PROPH/DIAG INJ SC/IM: CPT | Performed by: ALLERGY & IMMUNOLOGY

## 2022-03-21 PROCEDURE — 99214 OFFICE O/P EST MOD 30 MIN: CPT | Performed by: ALLERGY & IMMUNOLOGY

## 2022-03-21 RX ORDER — FLUTICASONE PROPIONATE 50 MCG
2 SPRAY, SUSPENSION (ML) NASAL DAILY
Qty: 3 EACH | Refills: 0 | Status: SHIPPED | OUTPATIENT
Start: 2022-03-21

## 2022-03-21 NOTE — TELEPHONE ENCOUNTER
Dr. Darlene Carver may we have orders in the [facility] for patient's first Xolair? She receives 300mg every 14 days for CIU. Thank you.

## 2022-03-22 NOTE — TELEPHONE ENCOUNTER
[facility] contacted at [phone], spoke with pharmacy ,Sis. Inquired of rep what PA is on file for Xolair as the one approved under medical benefit was only approved for 2 months. Rep states that she sees the Medical Benefit PA. Informed rep that Medical Benefit will  2022, and that patient's Xolair should be covered under the pharmacy benefit PA approval.      PA approval for pharmacy benefit as below reported. Rep states that she will send information to the insurance verification department.       [employer] PA approval:  [employer] CAMPO approved: 2022 to 2023  Case Number: [id number]

## 2022-03-24 ENCOUNTER — TELEMEDICINE (OUTPATIENT)
Dept: ALLERGY | Facility: CLINIC | Age: 52
End: 2022-03-24

## 2022-03-24 DIAGNOSIS — L50.1 CHRONIC IDIOPATHIC URTICARIA: Primary | ICD-10-CM

## 2022-03-24 PROCEDURE — 99213 OFFICE O/P EST LOW 20 MIN: CPT | Performed by: ALLERGY & IMMUNOLOGY

## 2022-03-24 RX ORDER — PREDNISONE 10 MG/1
TABLET ORAL
Qty: 38 TABLET | Refills: 0 | Status: SHIPPED | OUTPATIENT
Start: 2022-03-24

## 2022-03-24 RX ORDER — FLUOCINONIDE 0.5 MG/G
OINTMENT TOPICAL
Qty: 60 G | Refills: 0 | Status: SHIPPED | OUTPATIENT
Start: 2022-03-24

## 2022-03-24 RX ORDER — PREDNISONE 10 MG/1
TABLET ORAL
Qty: 38 TABLET | Refills: 0 | OUTPATIENT
Start: 2022-03-24

## 2022-03-24 NOTE — PATIENT INSTRUCTIONS
Recs: Continue with Xyzal 4 times per day, Pepcid twice per day with Benadryl as needed  Start prednisone 40 mg once a day with food x5 days. If hives are resolved after 5 days then may stop prednisone. If hives are improved but not resolved then complete the taper including 30 mg a day x3 days then 20 mg a day x3 days then 10 mg once a day x3 days with food for a total of 2 weeks  Continue with Xolair every 4 weeks. Reviewed Xolair can take up to 3 months did show full efficacy.   Follow-up in 4 weeks for next Xolair dosing  Recommend ED evaluation of worsening symptoms

## 2022-03-31 ENCOUNTER — HOSPITAL ENCOUNTER (OUTPATIENT)
Dept: MAMMOGRAPHY | Facility: HOSPITAL | Age: 52
Discharge: HOME | End: 2022-03-31
Attending: OBSTETRICS & GYNECOLOGY
Payer: COMMERCIAL

## 2022-03-31 DIAGNOSIS — Z12.31 VISIT FOR SCREENING MAMMOGRAM: ICD-10-CM

## 2022-03-31 PROCEDURE — 77067 SCR MAMMO BI INCL CAD: CPT | Performed by: OBSTETRICS & GYNECOLOGY

## 2022-03-31 PROCEDURE — 77063 BREAST TOMOSYNTHESIS BI: CPT | Performed by: OBSTETRICS & GYNECOLOGY

## 2022-03-31 NOTE — TELEPHONE ENCOUNTER
[facility] contacted at [phone], spoke with pharmacy service rep. Rep offers that Xolair PA under Pharmacy Benefit as below is noted in patient's file. Will monitor if there is any difficulty in Xolair processing due to Carleen Sanford PA, as medical PA for Xolair will  after 2022.

## 2022-04-12 ENCOUNTER — TELEPHONE (OUTPATIENT)
Dept: ALLERGY | Facility: CLINIC | Age: 52
End: 2022-04-12

## 2022-04-12 RX ORDER — FLUOCINONIDE 0.5 MG/G
OINTMENT TOPICAL
Qty: 60 G | Refills: 0 | Status: SHIPPED | OUTPATIENT
Start: 2022-04-12

## 2022-04-12 RX ORDER — PREDNISONE 10 MG/1
TABLET ORAL
Qty: 38 TABLET | Refills: 0 | Status: CANCELLED | OUTPATIENT
Start: 2022-04-12

## 2022-04-12 RX ORDER — HYDROXYZINE HYDROCHLORIDE 25 MG/1
25 TABLET, FILM COATED ORAL EVERY 4 HOURS PRN
Qty: 30 TABLET | Refills: 0 | Status: SHIPPED | OUTPATIENT
Start: 2022-04-12

## 2022-04-12 NOTE — TELEPHONE ENCOUNTER
Spoke with patient. Verified name and . Patient states in the past 2-3 days the hives have returned on arms and face, eyes are slightly swollen, hives are red and itchy. patient denies problems swallowing, denies breathing problems, is speaking in clear sentences. .     Patient states she is taking Xyzal 4 times a day and Allegra 180 mg- 2 times a day,states Benadryl does not help. Patient is requesting a refill of Prednisone. Informed patient will forward this message to Dr. Rey Speak for further recommendations and may nee to seek care through urgent care. Patient verbalizes understanding.     LOV  3/24/22-telemed

## 2022-04-12 NOTE — TELEPHONE ENCOUNTER
RN called pt back with Dr. Higinio Garay recommendations. She will try hydroxyzine. She has a e-visit scheduled tomorrow with Dr. Nicky Tavera. She is aware to follow up in ER or Urgent Care if she develops any difficulties breathing, talking or swallowing. She denies any difficulties breathing, talking or swallowing at this time. She reports hives are on face but will continue to monitor for any worsening.

## 2022-04-12 NOTE — TELEPHONE ENCOUNTER
Prescription for hydroxyzine 25 mg every 4-6 hours sent to pharmacy in place of prednisone. Would prefer to try hydroxyzine prior to more prednisone.   Patient still feels she needs prednisone she may consider a video visit appointment later this week or proceed to urgent care for evaluation

## 2022-04-13 ENCOUNTER — TELEMEDICINE (OUTPATIENT)
Dept: ALLERGY | Facility: CLINIC | Age: 52
End: 2022-04-13

## 2022-04-13 DIAGNOSIS — L50.1 CHRONIC IDIOPATHIC URTICARIA: Primary | ICD-10-CM

## 2022-04-13 PROCEDURE — 99214 OFFICE O/P EST MOD 30 MIN: CPT | Performed by: ALLERGY & IMMUNOLOGY

## 2022-04-13 RX ORDER — PREDNISONE 20 MG/1
TABLET ORAL
Qty: 10 TABLET | Refills: 0 | Status: SHIPPED | OUTPATIENT
Start: 2022-04-13

## 2022-04-13 NOTE — PATIENT INSTRUCTIONS
Recs: Started Xolair 300 mg every 4 weeks last month. Patient due for second Xolair dosing next week. Recent flare in spite of current Xyzal Allegra Pepcid  Currently on prednisone 20 to 40 mg a day x1 day from urgent care  Prescription placed for prednisone 20 mg twice a day for the next 5 days to get her to her next office visit  Reviewed potential side effects with prednisone.   Recommend to take prednisone with food  Continue with Xyzal up to 4 times per day Allegra once per day Pepcid twice per day  Xolair dosing at next office visit next week  ED urgent care if worsening symptoms after hours

## 2022-04-18 ENCOUNTER — TELEPHONE (OUTPATIENT)
Dept: ALLERGY | Facility: CLINIC | Age: 52
End: 2022-04-18

## 2022-04-18 ENCOUNTER — OFFICE VISIT (OUTPATIENT)
Dept: ALLERGY | Facility: CLINIC | Age: 52
End: 2022-04-18
Payer: COMMERCIAL

## 2022-04-18 ENCOUNTER — NURSE ONLY (OUTPATIENT)
Dept: ALLERGY | Facility: CLINIC | Age: 52
End: 2022-04-18
Payer: COMMERCIAL

## 2022-04-18 VITALS
WEIGHT: 216 LBS | RESPIRATION RATE: 20 BRPM | BODY MASS INDEX: 33.9 KG/M2 | OXYGEN SATURATION: 97 % | HEIGHT: 67 IN | DIASTOLIC BLOOD PRESSURE: 96 MMHG | HEART RATE: 103 BPM | TEMPERATURE: 98 F | SYSTOLIC BLOOD PRESSURE: 142 MMHG

## 2022-04-18 DIAGNOSIS — L50.1 CHRONIC IDIOPATHIC URTICARIA: ICD-10-CM

## 2022-04-18 DIAGNOSIS — J45.909 EXTRINSIC ASTHMA WITHOUT COMPLICATION, UNSPECIFIED ASTHMA SEVERITY, UNSPECIFIED WHETHER PERSISTENT: ICD-10-CM

## 2022-04-18 DIAGNOSIS — Z79.899 VISIT FOR MONITORING XOLAIR THERAPY: Primary | ICD-10-CM

## 2022-04-18 DIAGNOSIS — J30.2 PERENNIAL ALLERGIC RHINITIS WITH SEASONAL VARIATION: ICD-10-CM

## 2022-04-18 DIAGNOSIS — Z51.81 VISIT FOR MONITORING XOLAIR THERAPY: Primary | ICD-10-CM

## 2022-04-18 DIAGNOSIS — J30.89 PERENNIAL ALLERGIC RHINITIS WITH SEASONAL VARIATION: ICD-10-CM

## 2022-04-18 PROCEDURE — 3080F DIAST BP >= 90 MM HG: CPT | Performed by: ALLERGY & IMMUNOLOGY

## 2022-04-18 PROCEDURE — 99214 OFFICE O/P EST MOD 30 MIN: CPT | Performed by: ALLERGY & IMMUNOLOGY

## 2022-04-18 PROCEDURE — 3008F BODY MASS INDEX DOCD: CPT | Performed by: ALLERGY & IMMUNOLOGY

## 2022-04-18 PROCEDURE — 3077F SYST BP >= 140 MM HG: CPT | Performed by: ALLERGY & IMMUNOLOGY

## 2022-04-18 RX ORDER — PREDNISONE 10 MG/1
20 TABLET ORAL 2 TIMES DAILY
Qty: 120 TABLET | Refills: 0 | Status: SHIPPED | OUTPATIENT
Start: 2022-04-18

## 2022-04-18 NOTE — PROGRESS NOTES
Per standing order patient to continue Xolair injections every 28 days- 300 mg. Patient verified name, , and injection to be given. Xolair 150 mg/mL prefilled syringe x2  NDC#: 29463-372-83  Lot #: [id number]  Exp Date: 2023    At 1005, Xolair 150 mg/mL prefilled syringe administered subcutaneously to Right and Left Upper Arms. ?  Patient monitored x 30 min after administration. At [phone], patient released from office without any sign/symptoms of local or systemic reaction.     Patient scheduled next Biologic Injection Appointment  : 2022, 0945 am

## 2022-04-18 NOTE — PATIENT INSTRUCTIONS
#1 chronic idiopathic urticaria  Recent flare that required prednisone. Currently on prednisone 20 mg twice a day. Will attempt to stepdown prednisone to 10 mg twice a day  Continue with Xyzal 5 mg 4 times per day, Pepcid twice per day  Xolair dose #2 today followed by 30-minute observation peer without issue or incident  Follow-up in 1 month or sooner if needed  Should hives worsen in the interim they may increase prednisone to 20 mg twice a day with food    #2 asthma  Denies active or persistent symptoms more than 2 days/week with current regimen. Continue with current medications albuterol every 4-6 hours as needed. Reviewed signs and symptoms of persistent asthma including the rules of 2    #3 allergic rhinitis  Continue with Xyzal.  Add Flonase if refractory.   Reviewed avoidance measures and potential treatment option of immunotherapy    Follow-up in 1 month or sooner if needed

## 2022-04-18 NOTE — TELEPHONE ENCOUNTER
Jimmy from [facility] calling to schedule delivery of Xolair. Expected delivery is Wednesday 4/20 via [employer]. Confirmed shipping address and dose.

## 2022-04-25 NOTE — TELEPHONE ENCOUNTER
Received a fax from patient's insurance [employer] regarding approval for Xyzal from 4/22/2022 through 4/22/2023. Spoke with pharmacist, Harley Reyes regarding PA approval for patient for Xyzal. pharmacist verbalizes understanding. Contacted patient regarding PA approval for Xyzal and that pharmacy has been contacted regarding this. Patient verbalizes understanding. Will send to scan.

## 2022-04-29 ENCOUNTER — PATIENT MESSAGE (OUTPATIENT)
Dept: ALLERGY | Facility: CLINIC | Age: 52
End: 2022-04-29

## 2022-04-30 NOTE — TELEPHONE ENCOUNTER
Patient contacted and given advice as per Dr. Arlette Zhang below. Patient repeated back information, stating that she will take Prednisone 10 mg 1-2 times a day until f/u appt with Dr. Arlette Zhang 5/16. Patient agreed to contact PCP for screening for elevated glucose in blood and  Glucose in urine if urinary frequency does not dissipate. Sindi Reyes MD  to [facility] Allergy Clinical Staff    Milagros Sen 8:52 AM  Note  Call reviewed is noted. Recommend to take prednisone 10 mg once a day up to twice a day till we see her next.   Would not recommend stopping prednisone altogether as she will need to be tapered off of prednisone  If patient continues to have urinary frequency I would recommend to follow-up with PCP to screen for elevated blood sugar levels or glucose in her urine

## 2022-04-30 NOTE — TELEPHONE ENCOUNTER
Triage)    Assessment)    Patient contacted via telephone. Patient reports that 4/18/2022 since last Xolair and since taking Prednisone 10 mg sometimes 2 times daily, sometimes 1 time daily, sometimes not at all depending if she feels she needs Prednisone or not. Patient complains of resolving hives and less pruritus. If she feels more pruritus she will take Prednisone 1-2 times daily. She reports she has been taking Prednisone 10 mg daily for the last 2 days. Patient c/o since taking Prednisone as above she has been experiencing urinary frequency, headaches and burping. Patient denies chest pain, shortness of breath, feeling of heaviness in chest, or wheezing. Patient denies edema anywhere throughout body. Meds)    Xyzal 5 mg up to 4 times a day as needed    Hydroxyzine 25 mg prior to bed    Prednisone as above    Plan)    Patient informed that alternating dose of Prednisone may cause symptoms she is experiencing. Patient informed Dr. Catherine Valentine will address and nurse will call back with further advise.

## 2022-04-30 NOTE — TELEPHONE ENCOUNTER
Call reviewed is noted. Recommend to take prednisone 10 mg once a day up to twice a day till we see her next.   Would not recommend stopping prednisone altogether as she will need to be tapered off of prednisone  If patient continues to have urinary frequency I would recommend to follow-up with PCP to screen for elevated blood sugar levels or glucose in her urine

## 2022-04-30 NOTE — TELEPHONE ENCOUNTER
From: Pretty Jimenez  To: Robert Maharaj MD  Sent: 4/29/2022 12:41 PM CDT  Subject: Just a quick question    Since the last appt the hives have been better and controlled better. ... I've been taking the lower dose   Of prednisone. .... but I've also been extremely thirty and running to the bathroom  Almost every 1.5 hours even when I'm trying to sleep . ... and also I don't have chest pains but I've been feeling like I have heart burn and indigestion when I eat . .. I don't know if this is a side effect or cause for concern. ..

## 2022-05-10 ENCOUNTER — OFFICE VISIT (OUTPATIENT)
Dept: FAMILY MEDICINE CLINIC | Facility: CLINIC | Age: 52
End: 2022-05-10
Payer: COMMERCIAL

## 2022-05-10 ENCOUNTER — HOSPITAL ENCOUNTER (INPATIENT)
Facility: HOSPITAL | Age: 52
LOS: 2 days | Discharge: HOME | End: 2022-05-12
Attending: EMERGENCY MEDICINE | Admitting: HOSPITALIST
Payer: COMMERCIAL

## 2022-05-10 VITALS
BODY MASS INDEX: 31.55 KG/M2 | SYSTOLIC BLOOD PRESSURE: 125 MMHG | HEIGHT: 67 IN | HEART RATE: 120 BPM | DIASTOLIC BLOOD PRESSURE: 81 MMHG | WEIGHT: 201 LBS

## 2022-05-10 DIAGNOSIS — I10 ESSENTIAL HYPERTENSION: ICD-10-CM

## 2022-05-10 DIAGNOSIS — R73.9 HYPERGLYCEMIA: Primary | ICD-10-CM

## 2022-05-10 DIAGNOSIS — R73.9 HYPERGLYCEMIA: ICD-10-CM

## 2022-05-10 DIAGNOSIS — R35.0 FREQUENT URINATION: Primary | ICD-10-CM

## 2022-05-10 LAB
ANION GAP SERPL CALC-SCNC: 15 MMOL/L (ref 0–18)
APPEARANCE: CLEAR
BASOPHILS # BLD AUTO: 0.02 X10(3) UL (ref 0–0.2)
BASOPHILS NFR BLD AUTO: 0.2 %
BILIRUB UR QL: NEGATIVE
BUN BLD-MCNC: 26 MG/DL (ref 7–18)
BUN/CREAT SERPL: 16.8 (ref 10–20)
CALCIUM BLD-MCNC: 10 MG/DL (ref 8.5–10.1)
CARTRIDGE LOT#: NORMAL NUMERIC
CHLORIDE SERPL-SCNC: 88 MMOL/L (ref 98–112)
CLARITY UR: CLEAR
CO2 SERPL-SCNC: 22 MMOL/L (ref 21–32)
COLOR UR: COLORLESS
CREAT BLD-MCNC: 1.55 MG/DL
DEPRECATED RDW RBC AUTO: 39.8 FL (ref 35.1–46.3)
EOSINOPHIL # BLD AUTO: 0.01 X10(3) UL (ref 0–0.7)
EOSINOPHIL NFR BLD AUTO: 0.1 %
ERYTHROCYTE [DISTWIDTH] IN BLOOD BY AUTOMATED COUNT: 11.6 % (ref 11–15)
EST. AVERAGE GLUCOSE BLD GHB EST-MCNC: 332 MG/DL (ref 68–126)
GLUCOSE BLD-MCNC: 793 MG/DL (ref 70–99)
GLUCOSE BLDC GLUCOMTR-MCNC: 202 MG/DL (ref 70–99)
GLUCOSE BLDC GLUCOMTR-MCNC: 320 MG/DL (ref 70–99)
GLUCOSE BLDC GLUCOMTR-MCNC: 411 MG/DL (ref 70–99)
GLUCOSE BLDC GLUCOMTR-MCNC: >600 MG/DL (ref 70–99)
GLUCOSE BLDC GLUCOMTR-MCNC: >600 MG/DL (ref 70–99)
GLUCOSE UR-MCNC: >=1000 MG/DL
HBA1C MFR BLD: 13.2 % (ref ?–5.7)
HCT VFR BLD AUTO: 43.5 %
HEMOGLOBIN A1C: >14 % (ref 4.3–5.6)
HGB BLD-MCNC: 14.3 G/DL
HGB UR QL STRIP.AUTO: NEGATIVE
IMM GRANULOCYTES # BLD AUTO: 0.04 X10(3) UL (ref 0–1)
IMM GRANULOCYTES NFR BLD: 0.3 %
KETONES (URINE DIPSTICK): 40 MG/DL
KETONES UR-MCNC: 15 MG/DL
LEUKOCYTE ESTERASE UR QL STRIP.AUTO: NEGATIVE
LEUKOCYTES: NEGATIVE
LYMPHOCYTES # BLD AUTO: 1.36 X10(3) UL (ref 1–4)
LYMPHOCYTES NFR BLD AUTO: 11.3 %
MCH RBC QN AUTO: 31 PG (ref 26–34)
MCHC RBC AUTO-ENTMCNC: 32.9 G/DL (ref 31–37)
MCV RBC AUTO: 94.4 FL
MONOCYTES # BLD AUTO: 0.46 X10(3) UL (ref 0.1–1)
MONOCYTES NFR BLD AUTO: 3.8 %
MULTISTIX LOT#: ABNORMAL NUMERIC
NEUTROPHILS # BLD AUTO: 10.1 X10 (3) UL (ref 1.5–7.7)
NEUTROPHILS # BLD AUTO: 10.1 X10(3) UL (ref 1.5–7.7)
NEUTROPHILS NFR BLD AUTO: 84.3 %
NITRITE UR QL STRIP.AUTO: NEGATIVE
NITRITE, URINE: NEGATIVE
OCCULT BLOOD: NEGATIVE
OSMOLALITY SERPL CALC.SUM OF ELEC: 303 MOSM/KG (ref 275–295)
PH UR: 5.5 [PH] (ref 5–8)
PH, URINE: 6 (ref 4.5–8)
PLATELET # BLD AUTO: 333 10(3)UL (ref 150–450)
POTASSIUM SERPL-SCNC: 4.9 MMOL/L (ref 3.5–5.1)
PROT UR-MCNC: NEGATIVE MG/DL
PROTEIN (URINE DIPSTICK): NEGATIVE MG/DL
RBC # BLD AUTO: 4.61 X10(6)UL
SARS-COV-2 RNA RESP QL NAA+PROBE: NOT DETECTED
SODIUM SERPL-SCNC: 125 MMOL/L (ref 136–145)
SP GR UR STRIP: 1.01 (ref 1–1.03)
SPECIFIC GRAVITY: 1 (ref 1–1.03)
TEST STRIP LOT #: ABNORMAL NUMERIC
URINE-COLOR: YELLOW
UROBILINOGEN UR STRIP-ACNC: 0.2
UROBILINOGEN,SEMI-QN: 0.2 MG/DL (ref 0–1.9)
WBC # BLD AUTO: 12 X10(3) UL (ref 4–11)

## 2022-05-10 PROCEDURE — 3078F DIAST BP <80 MM HG: CPT | Performed by: HOSPITALIST

## 2022-05-10 PROCEDURE — 99223 1ST HOSP IP/OBS HIGH 75: CPT | Performed by: HOSPITALIST

## 2022-05-10 PROCEDURE — 3074F SYST BP LT 130 MM HG: CPT | Performed by: FAMILY MEDICINE

## 2022-05-10 PROCEDURE — 3075F SYST BP GE 130 - 139MM HG: CPT | Performed by: HOSPITALIST

## 2022-05-10 PROCEDURE — 81002 URINALYSIS NONAUTO W/O SCOPE: CPT | Performed by: FAMILY MEDICINE

## 2022-05-10 PROCEDURE — 82947 ASSAY GLUCOSE BLOOD QUANT: CPT | Performed by: FAMILY MEDICINE

## 2022-05-10 PROCEDURE — 99214 OFFICE O/P EST MOD 30 MIN: CPT | Performed by: FAMILY MEDICINE

## 2022-05-10 PROCEDURE — 3008F BODY MASS INDEX DOCD: CPT | Performed by: FAMILY MEDICINE

## 2022-05-10 PROCEDURE — 3079F DIAST BP 80-89 MM HG: CPT | Performed by: FAMILY MEDICINE

## 2022-05-10 PROCEDURE — 83036 HEMOGLOBIN GLYCOSYLATED A1C: CPT | Performed by: FAMILY MEDICINE

## 2022-05-10 PROCEDURE — 3046F HEMOGLOBIN A1C LEVEL >9.0%: CPT | Performed by: FAMILY MEDICINE

## 2022-05-10 PROCEDURE — 3079F DIAST BP 80-89 MM HG: CPT | Performed by: HOSPITALIST

## 2022-05-10 RX ORDER — PROCHLORPERAZINE EDISYLATE 5 MG/ML
5 INJECTION INTRAMUSCULAR; INTRAVENOUS EVERY 8 HOURS PRN
Status: DISCONTINUED | OUTPATIENT
Start: 2022-05-10 | End: 2022-05-12

## 2022-05-10 RX ORDER — ONDANSETRON 2 MG/ML
4 INJECTION INTRAMUSCULAR; INTRAVENOUS EVERY 6 HOURS PRN
Status: DISCONTINUED | OUTPATIENT
Start: 2022-05-10 | End: 2022-05-12

## 2022-05-10 RX ORDER — HEPARIN SODIUM 5000 [USP'U]/ML
5000 INJECTION, SOLUTION INTRAVENOUS; SUBCUTANEOUS EVERY 12 HOURS SCHEDULED
Status: DISCONTINUED | OUTPATIENT
Start: 2022-05-10 | End: 2022-05-12

## 2022-05-10 RX ORDER — NICOTINE POLACRILEX 4 MG
30 LOZENGE BUCCAL
Status: DISCONTINUED | OUTPATIENT
Start: 2022-05-10 | End: 2022-05-12

## 2022-05-10 RX ORDER — SODIUM CHLORIDE 9 MG/ML
INJECTION, SOLUTION INTRAVENOUS CONTINUOUS
Status: DISCONTINUED | OUTPATIENT
Start: 2022-05-10 | End: 2022-05-11

## 2022-05-10 RX ORDER — NICOTINE POLACRILEX 4 MG
15 LOZENGE BUCCAL
Status: DISCONTINUED | OUTPATIENT
Start: 2022-05-10 | End: 2022-05-12

## 2022-05-10 RX ORDER — ACETAMINOPHEN 325 MG/1
650 TABLET ORAL EVERY 6 HOURS PRN
Status: DISCONTINUED | OUTPATIENT
Start: 2022-05-10 | End: 2022-05-12

## 2022-05-10 RX ORDER — TEMAZEPAM 15 MG/1
15 CAPSULE ORAL NIGHTLY PRN
Status: DISCONTINUED | OUTPATIENT
Start: 2022-05-10 | End: 2022-05-12

## 2022-05-10 RX ORDER — DEXTROSE MONOHYDRATE 25 G/50ML
50 INJECTION, SOLUTION INTRAVENOUS
Status: DISCONTINUED | OUTPATIENT
Start: 2022-05-10 | End: 2022-05-12

## 2022-05-10 NOTE — ED INITIAL ASSESSMENT (HPI)
Patient states she has been taking prednisone since December, states she has had polyuria and polydipsia for about a week and a half, states she had her blood levels checked and her a1c was >14

## 2022-05-11 ENCOUNTER — TELEPHONE (OUTPATIENT)
Dept: ENDOCRINOLOGY CLINIC | Facility: CLINIC | Age: 52
End: 2022-05-11

## 2022-05-11 ENCOUNTER — TELEPHONE (OUTPATIENT)
Dept: ALLERGY | Facility: CLINIC | Age: 52
End: 2022-05-11

## 2022-05-11 LAB
ANION GAP SERPL CALC-SCNC: 6 MMOL/L (ref 0–18)
BASOPHILS # BLD AUTO: 0.01 X10(3) UL (ref 0–0.2)
BASOPHILS NFR BLD AUTO: 0.1 %
BUN BLD-MCNC: 18 MG/DL (ref 7–18)
BUN/CREAT SERPL: 22.5 (ref 10–20)
CALCIUM BLD-MCNC: 8.7 MG/DL (ref 8.5–10.1)
CHLORIDE SERPL-SCNC: 104 MMOL/L (ref 98–112)
CO2 SERPL-SCNC: 28 MMOL/L (ref 21–32)
CREAT BLD-MCNC: 0.8 MG/DL
DEPRECATED RDW RBC AUTO: 39.8 FL (ref 35.1–46.3)
EOSINOPHIL # BLD AUTO: 0.04 X10(3) UL (ref 0–0.7)
EOSINOPHIL NFR BLD AUTO: 0.5 %
ERYTHROCYTE [DISTWIDTH] IN BLOOD BY AUTOMATED COUNT: 11.5 % (ref 11–15)
GLUCOSE BLD-MCNC: 113 MG/DL (ref 70–99)
GLUCOSE BLDC GLUCOMTR-MCNC: 108 MG/DL (ref 70–99)
GLUCOSE BLDC GLUCOMTR-MCNC: 118 MG/DL (ref 70–99)
GLUCOSE BLDC GLUCOMTR-MCNC: 122 MG/DL (ref 70–99)
GLUCOSE BLDC GLUCOMTR-MCNC: 126 MG/DL (ref 70–99)
GLUCOSE BLDC GLUCOMTR-MCNC: 126 MG/DL (ref 70–99)
GLUCOSE BLDC GLUCOMTR-MCNC: 127 MG/DL (ref 70–99)
GLUCOSE BLDC GLUCOMTR-MCNC: 136 MG/DL (ref 70–99)
GLUCOSE BLDC GLUCOMTR-MCNC: 140 MG/DL (ref 70–99)
GLUCOSE BLDC GLUCOMTR-MCNC: 140 MG/DL (ref 70–99)
GLUCOSE BLDC GLUCOMTR-MCNC: 141 MG/DL (ref 70–99)
GLUCOSE BLDC GLUCOMTR-MCNC: 143 MG/DL (ref 70–99)
GLUCOSE BLDC GLUCOMTR-MCNC: 151 MG/DL (ref 70–99)
GLUCOSE BLDC GLUCOMTR-MCNC: 157 MG/DL (ref 70–99)
GLUCOSE BLDC GLUCOMTR-MCNC: 161 MG/DL (ref 70–99)
GLUCOSE BLDC GLUCOMTR-MCNC: 168 MG/DL (ref 70–99)
GLUCOSE BLDC GLUCOMTR-MCNC: 198 MG/DL (ref 70–99)
GLUCOSE BLDC GLUCOMTR-MCNC: 250 MG/DL (ref 70–99)
GLUCOSE BLDC GLUCOMTR-MCNC: 331 MG/DL (ref 70–99)
GLUCOSE BLDC GLUCOMTR-MCNC: 74 MG/DL (ref 70–99)
GLUCOSE BLDC GLUCOMTR-MCNC: 94 MG/DL (ref 70–99)
GLUCOSE BLDC GLUCOMTR-MCNC: 94 MG/DL (ref 70–99)
HCT VFR BLD AUTO: 38 %
HGB BLD-MCNC: 12.4 G/DL
IMM GRANULOCYTES # BLD AUTO: 0.03 X10(3) UL (ref 0–1)
IMM GRANULOCYTES NFR BLD: 0.4 %
LYMPHOCYTES # BLD AUTO: 2.28 X10(3) UL (ref 1–4)
LYMPHOCYTES NFR BLD AUTO: 26.7 %
MCH RBC QN AUTO: 30.9 PG (ref 26–34)
MCHC RBC AUTO-ENTMCNC: 32.6 G/DL (ref 31–37)
MCV RBC AUTO: 94.8 FL
MONOCYTES # BLD AUTO: 0.56 X10(3) UL (ref 0.1–1)
MONOCYTES NFR BLD AUTO: 6.6 %
NEUTROPHILS # BLD AUTO: 5.61 X10 (3) UL (ref 1.5–7.7)
NEUTROPHILS # BLD AUTO: 5.61 X10(3) UL (ref 1.5–7.7)
NEUTROPHILS NFR BLD AUTO: 65.7 %
OSMOLALITY SERPL CALC.SUM OF ELEC: 289 MOSM/KG (ref 275–295)
PLATELET # BLD AUTO: 308 10(3)UL (ref 150–450)
POTASSIUM SERPL-SCNC: 3.3 MMOL/L (ref 3.5–5.1)
RBC # BLD AUTO: 4.01 X10(6)UL
SODIUM SERPL-SCNC: 138 MMOL/L (ref 136–145)
WBC # BLD AUTO: 8.5 X10(3) UL (ref 4–11)

## 2022-05-11 PROCEDURE — 99254 IP/OBS CNSLTJ NEW/EST MOD 60: CPT | Performed by: INTERNAL MEDICINE

## 2022-05-11 PROCEDURE — 99233 SBSQ HOSP IP/OBS HIGH 50: CPT | Performed by: HOSPITALIST

## 2022-05-11 RX ORDER — PREDNISONE 10 MG/1
10 TABLET ORAL 2 TIMES DAILY WITH MEALS
Status: DISCONTINUED | OUTPATIENT
Start: 2022-05-11 | End: 2022-05-12

## 2022-05-11 RX ORDER — PEN NEEDLE, DIABETIC 32 GX3/16"
NEEDLE, DISPOSABLE MISCELLANEOUS
Qty: 200 EACH | Refills: 0 | Status: SHIPPED | OUTPATIENT
Start: 2022-05-11

## 2022-05-11 RX ORDER — LANCETS
EACH MISCELLANEOUS
Qty: 200 EACH | Refills: 0 | Status: SHIPPED | OUTPATIENT
Start: 2022-05-11

## 2022-05-11 RX ORDER — POTASSIUM CHLORIDE 20 MEQ/1
40 TABLET, EXTENDED RELEASE ORAL ONCE
Status: COMPLETED | OUTPATIENT
Start: 2022-05-11 | End: 2022-05-11

## 2022-05-11 NOTE — H&P
[facility]    PATIENT'S NAME: Manolo Ohara   ATTENDING PHYSICIAN: Darlin Holter, MD   PATIENT ACCOUNT#:   [de-identified]    LOCATION:  [address]  MEDICAL RECORD #:   [id number]       YOB: 1970  ADMISSION DATE:       05/10/2022    HISTORY AND PHYSICAL EXAMINATION    CHIEF COMPLAINT:  Hyperosmolar hyperglycemia, dehydration, and acute renal insufficiency. HISTORY OF PRESENT ILLNESS:  The patient is a 42-year-old [facility] [place] female who came into the emergency department for evaluation of polyuria, polydipsia, weight loss. Chemistries showed glucose 793. GFR 45 which is below her baseline. BUN and creatinine of 26 and 1.55. Chloride 88. CBC shows white blood cell count of 12.0. Patient was started on IV fluids, IV insulin drip, and she will be admitted to the hospital for further management. PAST MEDICAL HISTORY:  Idiopathic urticaria, has been on maintenance prednisone to control her symptoms and recently started on Xolair injections; she received 2 so far. Also has a history of hypertension and asthma. PAST SURGICAL HISTORY:  Right carpal tunnel release, left shoulder lipoma resection, anterior neck lipoma resection. MEDICATIONS:  Please see medication reconciliation list.     ALLERGIES:  Iodine and shellfish. FAMILY HISTORY:  Mother had diabetes mellitus type 2 and lung cancer. SOCIAL HISTORY:  No tobacco, alcohol, or drug use. Lives with her family. Independent for basic activities of daily living. REVIEW OF SYSTEMS:  Patient reports for the last 10 days she has been having polyuria, polydipsia, fatigue, and lost around 10 pounds. She has been on taper dose of prednisone for the last 3 months for idiopathic urticaria. Patient denied any fever or chills. No abdominal pain. Other 12-point review of systems is negative. PHYSICAL EXAMINATION:    GENERAL:  Alert, oriented to time, place, and person. Fatigued. No acute distress. VITAL SIGNS:  Temperature 98.0; pulse 120, sinus tachycardia; respiratory rate 18; blood pressure 137/88; pulse ox 05% on room air. HEENT:  Atraumatic. Oropharynx clear. Dry mucous membranes. Normal hard and soft palate. Eyes:  Anicteric sclerae. NECK:  Supple. No lymphadenopathy. Trachea midline. Full range of motion. LUNGS:  Clear to auscultation bilaterally. Normal respiratory effort. HEART:  Regular rate and rhythm. S1, S2 auscultated. No murmur. ABDOMEN:  Soft, nondistended, no tenderness. Positive bowel sounds. EXTREMITIES:  No peripheral edema, clubbing, or cyanosis. NEUROLOGIC:  Motor and sensory intact. ASSESSMENT AND PLAN:    1. Hyperosmolar hyperglycemia, new-onset diabetes mellitus type 2.   2.   Acute renal failure secondary to dehydration and polyuria. 3.   Underlying hypertension. The patient will be admitted to progressive care unit. IV fluids. IV insulin drip. Endocrinology consult. Monitor electrolytes. Monitor kidney function. DVT prophylaxis. Further recommendations to follow.       Dictated By Tereso La MD  d: 05/10/2022 18:54:32  t: 05/10/2022 19:24:32  Job [id number]  FB/

## 2022-05-11 NOTE — PLAN OF CARE
Insulin gtt continues, blood glucose levels are improving and patient reports feeling less fatigued. Patient A/Ox4 and ambulates independently after setup. Safety measures implemented.       Problem: Patient Centered Care  Goal: Patient preferences are identified and integrated in the patient's plan of care  Description: Interventions:  - What would you like us to know as we care for you?  - Provide timely, complete, and accurate information to patient/family  - Incorporate patient and family knowledge, values, beliefs, and cultural backgrounds into the planning and delivery of care  - Encourage patient/family to participate in care and decision-making at the level they choose  - Honor patient and family perspectives and choices  5/11/2022 0056 by Jayme Beasley RN  Outcome: Progressing  5/11/2022 0048 by Jayme Beasley RN  Outcome: Progressing     Problem: Diabetes/Glucose Control  Goal: Glucose maintained within prescribed range  Description: INTERVENTIONS:  - Monitor Blood Glucose as ordered  - Assess for signs and symptoms of hyperglycemia and hypoglycemia  - Administer ordered medications to maintain glucose within target range  - Assess barriers to adequate nutritional intake and initiate nutrition consult as needed  - Instruct patient on self management of diabetes  5/11/2022 0056 by Jayme Beasley RN  Outcome: Progressing  5/11/2022 0048 by Jayme Beasley RN  Outcome: Progressing     Problem: Patient/Family Goals  Goal: Patient/Family Long Term Goal  Description: Patient's Long Term Goal:     Interventions:  - See additional Care Plan goals for specific interventions  5/11/2022 0056 by Jayme Beasley RN  Outcome: Progressing  5/11/2022 0048 by Jayme Beasley RN  Outcome: Progressing  Goal: Patient/Family Short Term Goal  Description: Patient's Short Term Goal:     Interventions:   - See additional Care Plan goals for specific interventions  5/11/2022 0056 by Jayme Beasley RN  Outcome: Progressing  5/11/2022 0048 by Atul Callejas Hank Morales RN  Outcome: Progressing     Problem: PAIN - ADULT  Goal: Verbalizes/displays adequate comfort level or patient's stated pain goal  Description: INTERVENTIONS:  - Encourage pt to monitor pain and request assistance  - Assess pain using appropriate pain scale  - Administer analgesics based on type and severity of pain and evaluate response  - Implement non-pharmacological measures as appropriate and evaluate response  - Consider cultural and social influences on pain and pain management  - Manage/alleviate anxiety  - Utilize distraction and/or relaxation techniques  - Monitor for opioid side effects  - Notify MD/LIP if interventions unsuccessful or patient reports new pain  - Anticipate increased pain with activity and pre-medicate as appropriate  Outcome: Progressing     Problem: RISK FOR INFECTION - ADULT  Goal: Absence of fever/infection during anticipated neutropenic period  Description: INTERVENTIONS  - Monitor WBC  - Administer growth factors as ordered  - Implement neutropenic guidelines  Outcome: Progressing     Problem: SAFETY ADULT - FALL  Goal: Free from fall injury  Description: INTERVENTIONS:  - Assess pt frequently for physical needs  - Identify cognitive and physical deficits and behaviors that affect risk of falls.   - [place] fall precautions as indicated by assessment.  - Educate pt/family on patient safety including physical limitations  - Instruct pt to call for assistance with activity based on assessment  - Modify environment to reduce risk of injury  - Provide assistive devices as appropriate  - Consider OT/PT consult to assist with strengthening/mobility  - Encourage toileting schedule  Outcome: Progressing     Problem: DISCHARGE PLANNING  Goal: Discharge to home or other facility with appropriate resources  Description: INTERVENTIONS:  - Identify barriers to discharge w/pt and caregiver  - Include patient/family/discharge partner in discharge planning  - Arrange for needed discharge resources and transportation as appropriate  - Identify discharge learning needs (meds, wound care, etc)  - Arrange for interpreters to assist at discharge as needed  - Consider post-discharge preferences of patient/family/discharge partner  - Complete POLST form as appropriate  - Assess patient's ability to be responsible for managing their own health  - Refer to Case Management Department for coordinating discharge planning if the patient needs post-hospital services based on physician/LIP order or complex needs related to functional status, cognitive ability or social support system  Outcome: Progressing

## 2022-05-11 NOTE — PLAN OF CARE
Patient A&O x4. On RA, saturating well. All meds given per [facility]. IVF and insulin gtt stopped. Up to toilet independently. Call light within reach. All needs assessed and addressed. Will continue to monitor. Problem: Patient Centered Care  Goal: Patient preferences are identified and integrated in the patient's plan of care  Description: Interventions:  - What would you like us to know as we care for you? From home w/ family.   - Provide timely, complete, and accurate information to patient/family  - Incorporate patient and family knowledge, values, beliefs, and cultural backgrounds into the planning and delivery of care  - Encourage patient/family to participate in care and decision-making at the level they choose  - Honor patient and family perspectives and choices  Outcome: Progressing     Problem: Diabetes/Glucose Control  Goal: Glucose maintained within prescribed range  Description: INTERVENTIONS:  - Monitor Blood Glucose as ordered  - Assess for signs and symptoms of hyperglycemia and hypoglycemia  - Administer ordered medications to maintain glucose within target range  - Assess barriers to adequate nutritional intake and initiate nutrition consult as needed  - Instruct patient on self management of diabetes  Outcome: Progressing     Problem: SAFETY ADULT - FALL  Goal: Free from fall injury  Description: INTERVENTIONS:  - Assess pt frequently for physical needs  - Identify cognitive and physical deficits and behaviors that affect risk of falls.   - [place] fall precautions as indicated by assessment.  - Educate pt/family on patient safety including physical limitations  - Instruct pt to call for assistance with activity based on assessment  - Modify environment to reduce risk of injury  - Provide assistive devices as appropriate  - Consider OT/PT consult to assist with strengthening/mobility  - Encourage toileting schedule  Outcome: Progressing     Problem: DISCHARGE PLANNING  Goal: Discharge to home or other facility with appropriate resources  Description: INTERVENTIONS:  - Identify barriers to discharge w/pt and caregiver  - Include patient/family/discharge partner in discharge planning  - Arrange for needed discharge resources and transportation as appropriate  - Identify discharge learning needs (meds, wound care, etc)  - Arrange for interpreters to assist at discharge as needed  - Consider post-discharge preferences of patient/family/discharge partner  - Complete POLST form as appropriate  - Assess patient's ability to be responsible for managing their own health  - Refer to Case Management Department for coordinating discharge planning if the patient needs post-hospital services based on physician/LIP order or complex needs related to functional status, cognitive ability or social support system  Outcome: Progressing

## 2022-05-11 NOTE — TELEPHONE ENCOUNTER
F/U with CHEVY Rodriguez in OP on 5/17/22    RXs for meter/testing supplies and pen needles sent to [employer]

## 2022-05-11 NOTE — TELEPHONE ENCOUNTER
Please call patient to book an apt with [facility] in the next week  New diagnosis of DM, on steroids, will be discharged on insulin    Also, please send insurance covered meter and supplies   To check sugars twice a day  Please also send pen needles BID  I will send insulin tomorrow based on how she does today    Thanks

## 2022-05-12 VITALS
SYSTOLIC BLOOD PRESSURE: 130 MMHG | DIASTOLIC BLOOD PRESSURE: 77 MMHG | BODY MASS INDEX: 32.32 KG/M2 | HEIGHT: 67 IN | TEMPERATURE: 98 F | RESPIRATION RATE: 18 BRPM | OXYGEN SATURATION: 97 % | WEIGHT: 205.94 LBS | HEART RATE: 88 BPM

## 2022-05-12 LAB
ANION GAP SERPL CALC-SCNC: 3 MMOL/L (ref 0–18)
BASOPHILS # BLD AUTO: 0.02 X10(3) UL (ref 0–0.2)
BASOPHILS NFR BLD AUTO: 0.3 %
BUN BLD-MCNC: 12 MG/DL (ref 7–18)
BUN/CREAT SERPL: 17.1 (ref 10–20)
CALCIUM BLD-MCNC: 8.8 MG/DL (ref 8.5–10.1)
CHLORIDE SERPL-SCNC: 104 MMOL/L (ref 98–112)
CO2 SERPL-SCNC: 29 MMOL/L (ref 21–32)
CREAT BLD-MCNC: 0.7 MG/DL
DEPRECATED RDW RBC AUTO: 38.8 FL (ref 35.1–46.3)
EOSINOPHIL # BLD AUTO: 0.02 X10(3) UL (ref 0–0.7)
EOSINOPHIL NFR BLD AUTO: 0.3 %
ERYTHROCYTE [DISTWIDTH] IN BLOOD BY AUTOMATED COUNT: 11.5 % (ref 11–15)
GLUCOSE BLD-MCNC: 246 MG/DL (ref 70–99)
GLUCOSE BLDC GLUCOMTR-MCNC: 211 MG/DL (ref 70–99)
GLUCOSE BLDC GLUCOMTR-MCNC: 247 MG/DL (ref 70–99)
HCT VFR BLD AUTO: 38.3 %
HGB BLD-MCNC: 12.6 G/DL
IMM GRANULOCYTES # BLD AUTO: 0.02 X10(3) UL (ref 0–1)
IMM GRANULOCYTES NFR BLD: 0.3 %
LYMPHOCYTES # BLD AUTO: 1.58 X10(3) UL (ref 1–4)
LYMPHOCYTES NFR BLD AUTO: 22.4 %
MCH RBC QN AUTO: 30.8 PG (ref 26–34)
MCHC RBC AUTO-ENTMCNC: 32.9 G/DL (ref 31–37)
MCV RBC AUTO: 93.6 FL
MONOCYTES # BLD AUTO: 0.43 X10(3) UL (ref 0.1–1)
MONOCYTES NFR BLD AUTO: 6.1 %
NEUTROPHILS # BLD AUTO: 4.97 X10 (3) UL (ref 1.5–7.7)
NEUTROPHILS # BLD AUTO: 4.97 X10(3) UL (ref 1.5–7.7)
NEUTROPHILS NFR BLD AUTO: 70.6 %
OSMOLALITY SERPL CALC.SUM OF ELEC: 290 MOSM/KG (ref 275–295)
PLATELET # BLD AUTO: 301 10(3)UL (ref 150–450)
POTASSIUM SERPL-SCNC: 3.9 MMOL/L (ref 3.5–5.1)
POTASSIUM SERPL-SCNC: 3.9 MMOL/L (ref 3.5–5.1)
RBC # BLD AUTO: 4.09 X10(6)UL
SODIUM SERPL-SCNC: 136 MMOL/L (ref 136–145)
WBC # BLD AUTO: 7 X10(3) UL (ref 4–11)

## 2022-05-12 PROCEDURE — 99239 HOSP IP/OBS DSCHRG MGMT >30: CPT | Performed by: HOSPITALIST

## 2022-05-12 RX ORDER — LANCETS 33 GAUGE
EACH MISCELLANEOUS
Qty: 200 EACH | Refills: 0 | Status: SHIPPED | OUTPATIENT
Start: 2022-05-12

## 2022-05-12 RX ORDER — BLOOD-GLUCOSE METER
1 EACH MISCELLANEOUS AS DIRECTED
Qty: 1 KIT | Refills: 0 | Status: SHIPPED | OUTPATIENT
Start: 2022-05-12

## 2022-05-12 RX ORDER — BLOOD SUGAR DIAGNOSTIC
STRIP MISCELLANEOUS
Qty: 200 STRIP | Refills: 0 | Status: SHIPPED | OUTPATIENT
Start: 2022-05-12

## 2022-05-12 RX ORDER — INSULIN ASPART 100 [IU]/ML
34 INJECTION, SUSPENSION SUBCUTANEOUS
Qty: 61.2 ML | Refills: 0 | Status: SHIPPED | OUTPATIENT
Start: 2022-05-12 | End: 2022-05-13

## 2022-05-12 NOTE — PROGRESS NOTES
To whom it may concern:    Jayme Ibarra was admitted to [facility] from 5/10/2022 to 5/12/2022. She may return to work on Tuesday, May 17, 2022. Thank you.             Femi Smith MD  5/12/2022

## 2022-05-12 NOTE — PLAN OF CARE
Patient VSS, no acute changes during shift.  when received on unit, MD notified, corrective insulin given. Updated patient on plan of care, given education on insulin preparation and administration. Frequent rounding with bed always in the lowest position. Call light always in reach and safety precautions in place. Problem: Patient Centered Care  Goal: Patient preferences are identified and integrated in the patient's plan of care  Description: Interventions:  - What would you like us to know as we care for you?  From home with family  - Provide timely, complete, and accurate information to patient/family  - Incorporate patient and family knowledge, values, beliefs, and cultural backgrounds into the planning and delivery of care  - Encourage patient/family to participate in care and decision-making at the level they choose  - Honor patient and family perspectives and choices  Outcome: Progressing     Problem: Diabetes/Glucose Control  Goal: Glucose maintained within prescribed range  Description: INTERVENTIONS:  - Monitor Blood Glucose as ordered  - Assess for signs and symptoms of hyperglycemia and hypoglycemia  - Administer ordered medications to maintain glucose within target range  - Assess barriers to adequate nutritional intake and initiate nutrition consult as needed  - Instruct patient on self management of diabetes  Outcome: Progressing     Problem: Patient/Family Goals  Goal: Patient/Family Long Term Goal  Description: Patient's Long Term Goal: Go back home    Interventions:  - Diabetes education  -Monitor blood glucose  - See additional Care Plan goals for specific interventions  Outcome: Progressing  Goal: Patient/Family Short Term Goal  Description: Patient's Short Term Goal: Treat hyperglycemia    Interventions:   - Monitor blood glucose and treat as needed  - See additional Care Plan goals for specific interventions  Outcome: Progressing     Problem: PAIN - ADULT  Goal: Verbalizes/displays adequate comfort level or patient's stated pain goal  Description: INTERVENTIONS:  - Encourage pt to monitor pain and request assistance  - Assess pain using appropriate pain scale  - Administer analgesics based on type and severity of pain and evaluate response  - Implement non-pharmacological measures as appropriate and evaluate response  - Consider cultural and social influences on pain and pain management  - Manage/alleviate anxiety  - Utilize distraction and/or relaxation techniques  - Monitor for opioid side effects  - Notify MD/LIP if interventions unsuccessful or patient reports new pain  - Anticipate increased pain with activity and pre-medicate as appropriate  Outcome: Progressing     Problem: RISK FOR INFECTION - ADULT  Goal: Absence of fever/infection during anticipated neutropenic period  Description: INTERVENTIONS  - Monitor WBC  - Administer growth factors as ordered  - Implement neutropenic guidelines  Outcome: Progressing     Problem: SAFETY ADULT - FALL  Goal: Free from fall injury  Description: INTERVENTIONS:  - Assess pt frequently for physical needs  - Identify cognitive and physical deficits and behaviors that affect risk of falls.   - [place] fall precautions as indicated by assessment.  - Educate pt/family on patient safety including physical limitations  - Instruct pt to call for assistance with activity based on assessment  - Modify environment to reduce risk of injury  - Provide assistive devices as appropriate  - Consider OT/PT consult to assist with strengthening/mobility  - Encourage toileting schedule  Outcome: Progressing     Problem: DISCHARGE PLANNING  Goal: Discharge to home or other facility with appropriate resources  Description: INTERVENTIONS:  - Identify barriers to discharge w/pt and caregiver  - Include patient/family/discharge partner in discharge planning  - Arrange for needed discharge resources and transportation as appropriate  - Identify discharge learning needs (meds, wound care, etc)  - Arrange for interpreters to assist at discharge as needed  - Consider post-discharge preferences of patient/family/discharge partner  - Complete POLST form as appropriate  - Assess patient's ability to be responsible for managing their own health  - Refer to Case Management Department for coordinating discharge planning if the patient needs post-hospital services based on physician/LIP order or complex needs related to functional status, cognitive ability or social support system  Outcome: Progressing

## 2022-05-12 NOTE — PROGRESS NOTES
Report called to [facility]. Patient to transfer to [address] A&Ox4, RA, NSR/ST, continent of B&B, up w/ SBA. Plan for no tele. Skin intact. Meds sent with patient to next RN. Double RN skin check done prior to transfer off Unit. Skin check performed by this RN and Harriet RN. Wounds are as follows: none    Will remain available for any further questions or concerns.

## 2022-05-12 NOTE — PLAN OF CARE
Problem: Patient Centered Care  Goal: Patient preferences are identified and integrated in the patient's plan of care  Description: Interventions:  - What would you like us to know as we care for you?   - Provide timely, complete, and accurate information to patient/family  - Incorporate patient and family knowledge, values, beliefs, and cultural backgrounds into the planning and delivery of care  - Encourage patient/family to participate in care and decision-making at the level they choose  - Honor patient and family perspectives and choices  Outcome: Progressing     Problem: Diabetes/Glucose Control  Goal: Glucose maintained within prescribed range  Description: INTERVENTIONS:  - Monitor Blood Glucose as ordered  - Assess for signs and symptoms of hyperglycemia and hypoglycemia  - Administer ordered medications to maintain glucose within target range  - Assess barriers to adequate nutritional intake and initiate nutrition consult as needed  - Instruct patient on self management of diabetes  Outcome: Progressing     Problem: Patient/Family Goals  Goal: Patient/Family Long Term Goal  Description: Patient's Long Term Goal:     Interventions:  -   - See additional Care Plan goals for specific interventions  Outcome: Progressing  Goal: Patient/Family Short Term Goal  Description: Patient's Short Term Goal:     Interventions:   -   - See additional Care Plan goals for specific interventions  Outcome: Progressing     Problem: PAIN - ADULT  Goal: Verbalizes/displays adequate comfort level or patient's stated pain goal  Description: INTERVENTIONS:  - Encourage pt to monitor pain and request assistance  - Assess pain using appropriate pain scale  - Administer analgesics based on type and severity of pain and evaluate response  - Implement non-pharmacological measures as appropriate and evaluate response  - Consider cultural and social influences on pain and pain management  - Manage/alleviate anxiety  - Utilize distraction and/or relaxation techniques  - Monitor for opioid side effects  - Notify MD/LIP if interventions unsuccessful or patient reports new pain  - Anticipate increased pain with activity and pre-medicate as appropriate  Outcome: Progressing     Problem: RISK FOR INFECTION - ADULT  Goal: Absence of fever/infection during anticipated neutropenic period  Description: INTERVENTIONS  - Monitor WBC  - Administer growth factors as ordered  - Implement neutropenic guidelines  Outcome: Progressing     Problem: SAFETY ADULT - FALL  Goal: Free from fall injury  Description: INTERVENTIONS:  - Assess pt frequently for physical needs  - Identify cognitive and physical deficits and behaviors that affect risk of falls.   - [place] fall precautions as indicated by assessment.  - Educate pt/family on patient safety including physical limitations  - Instruct pt to call for assistance with activity based on assessment  - Modify environment to reduce risk of injury  - Provide assistive devices as appropriate  - Consider OT/PT consult to assist with strengthening/mobility  - Encourage toileting schedule  Outcome: Progressing     Problem: DISCHARGE PLANNING  Goal: Discharge to home or other facility with appropriate resources  Description: INTERVENTIONS:  - Identify barriers to discharge w/pt and caregiver  - Include patient/family/discharge partner in discharge planning  - Arrange for needed discharge resources and transportation as appropriate  - Identify discharge learning needs (meds, wound care, etc)  - Arrange for interpreters to assist at discharge as needed  - Consider post-discharge preferences of patient/family/discharge partner  - Complete POLST form as appropriate  - Assess patient's ability to be responsible for managing their own health  - Refer to Case Management Department for coordinating discharge planning if the patient needs post-hospital services based on physician/LIP order or complex needs related to functional status, cognitive ability or social support system  Outcome: Progressing 5/12/22 discharge today, giving own insulin injections with pen incl. Site placement, rotation sites, dial dose, per Dr. Bhupendra Johnston, pt will be on 70/30 insulin checking blood sugars morning & dinner, per MD, script called into pt's [facility] pharmacy incl. Insulin supplies. Instructed to follow up with diabetes education(reports has the info to call), follow up primary md & endocrinologist.  Anum Chappell. Understanding.

## 2022-05-12 NOTE — CM/SW NOTE
05/12/22 1000   Discharge disposition   Expected discharge disposition Home or Self   Discharge transportation Private car     Pt discussed during nursing rounds. Pt is stable for dc today. MD dc order entered. No home care needs identified on dc. Pt's friend will provide transport at dc per patient. Plan: Home today     / to remain available for support and/or discharge planning.      STEVEN LirianoN    [phone]

## 2022-05-12 NOTE — PROGRESS NOTES
All discharge instructions discussed with pt and all questions answered, peripheral IV removed, work note given to pt, this RN called pt's pharmacy to ensure that her prescription for insulin will be prepared and ready for her to , pt to be taken down to hospital entrance by PCT with all belongings and pt to drive herself home.

## 2022-05-12 NOTE — PLAN OF CARE
Problem: Patient Centered Care  Goal: Patient preferences are identified and integrated in the patient's plan of care  Description: Interventions:  - What would you like us to know as we care for you?   - Provide timely, complete, and accurate information to patient/family  - Incorporate patient and family knowledge, values, beliefs, and cultural backgrounds into the planning and delivery of care  - Encourage patient/family to participate in care and decision-making at the level they choose  - Honor patient and family perspectives and choices  5/12/2022 1128 by Feroz Maciel RN  Outcome: Completed  5/12/2022 1121 by Feroz Maciel RN  Outcome: Progressing     Problem: Diabetes/Glucose Control  Goal: Glucose maintained within prescribed range  Description: INTERVENTIONS:  - Monitor Blood Glucose as ordered  - Assess for signs and symptoms of hyperglycemia and hypoglycemia  - Administer ordered medications to maintain glucose within target range  - Assess barriers to adequate nutritional intake and initiate nutrition consult as needed  - Instruct patient on self management of diabetes  5/12/2022 1128 by Feroz Maciel RN  Outcome: Completed  5/12/2022 1121 by Feroz Maciel RN  Outcome: Progressing     Problem: Patient/Family Goals  Goal: Patient/Family Long Term Goal  Description: Patient's Long Term Goal:     Interventions:  -   - See additional Care Plan goals for specific interventions  5/12/2022 1128 by Feroz Maciel RN  Outcome: Completed  5/12/2022 1121 by Feroz Maciel RN  Outcome: Progressing  Goal: Patient/Family Short Term Goal  Description: Patient's Short Term Goal:     Interventions:   -   - See additional Care Plan goals for specific interventions  5/12/2022 1128 by Feroz Maciel RN  Outcome: Completed  5/12/2022 1121 by Feroz Maciel RN  Outcome: Progressing     Problem: PAIN - ADULT  Goal: Verbalizes/displays adequate comfort level or patient's stated pain goal  Description: INTERVENTIONS:  - Encourage pt to monitor pain and request assistance  - Assess pain using appropriate pain scale  - Administer analgesics based on type and severity of pain and evaluate response  - Implement non-pharmacological measures as appropriate and evaluate response  - Consider cultural and social influences on pain and pain management  - Manage/alleviate anxiety  - Utilize distraction and/or relaxation techniques  - Monitor for opioid side effects  - Notify MD/LIP if interventions unsuccessful or patient reports new pain  - Anticipate increased pain with activity and pre-medicate as appropriate  5/12/2022 1128 by Deepti Farrell RN  Outcome: Completed  5/12/2022 1121 by Deepti Farrell RN  Outcome: Progressing     Problem: RISK FOR INFECTION - ADULT  Goal: Absence of fever/infection during anticipated neutropenic period  Description: INTERVENTIONS  - Monitor WBC  - Administer growth factors as ordered  - Implement neutropenic guidelines  5/12/2022 1128 by Deepti Farrell RN  Outcome: Completed  5/12/2022 1121 by Deepti Farrell RN  Outcome: Progressing     Problem: SAFETY ADULT - FALL  Goal: Free from fall injury  Description: INTERVENTIONS:  - Assess pt frequently for physical needs  - Identify cognitive and physical deficits and behaviors that affect risk of falls.   - [place] fall precautions as indicated by assessment.  - Educate pt/family on patient safety including physical limitations  - Instruct pt to call for assistance with activity based on assessment  - Modify environment to reduce risk of injury  - Provide assistive devices as appropriate  - Consider OT/PT consult to assist with strengthening/mobility  - Encourage toileting schedule  5/12/2022 1128 by Deetpi Farrell RN  Outcome: Completed  5/12/2022 1121 by Deepti Farrell RN  Outcome: Progressing     Problem: DISCHARGE PLANNING  Goal: Discharge to home or other facility with appropriate resources  Description: INTERVENTIONS:  - Identify barriers to discharge w/pt and caregiver  - Include patient/family/discharge partner in discharge planning  - Arrange for needed discharge resources and transportation as appropriate  - Identify discharge learning needs (meds, wound care, etc)  - Arrange for interpreters to assist at discharge as needed  - Consider post-discharge preferences of patient/family/discharge partner  - Complete POLST form as appropriate  - Assess patient's ability to be responsible for managing their own health  - Refer to Case Management Department for coordinating discharge planning if the patient needs post-hospital services based on physician/LIP order or complex needs related to functional status, cognitive ability or social support system  5/12/2022 1128 by Natan Nuno, RN  Outcome: Completed  5/12/2022 1121 by Natan Nuno, RN  Outcome: Progressing

## 2022-05-13 ENCOUNTER — TELEPHONE (OUTPATIENT)
Dept: FAMILY MEDICINE CLINIC | Facility: CLINIC | Age: 52
End: 2022-05-13

## 2022-05-13 ENCOUNTER — PATIENT OUTREACH (OUTPATIENT)
Dept: CASE MANAGEMENT | Age: 52
End: 2022-05-13

## 2022-05-13 ENCOUNTER — TELEPHONE (OUTPATIENT)
Dept: ENDOCRINOLOGY CLINIC | Facility: CLINIC | Age: 52
End: 2022-05-13

## 2022-05-13 PROCEDURE — 1111F DSCHRG MED/CURRENT MED MERGE: CPT

## 2022-05-13 RX ORDER — INSULIN ASPART 100 [IU]/ML
34 INJECTION, SUSPENSION SUBCUTANEOUS
Qty: 61.2 ML | Refills: 0 | Status: SHIPPED | OUTPATIENT
Start: 2022-05-13

## 2022-05-13 NOTE — TELEPHONE ENCOUNTER
Dr. Wilbert Pierce,  Previous RX was for vials - RX with same instructions for pen pended for approval  (pen needles already sent)  Thanks

## 2022-05-13 NOTE — TELEPHONE ENCOUNTER
Pt discharged 5/12/22, hyperglycemia. Pt does not have HFU appt scheduled at this time. NCM tried to schedule appt with PCP however pt can only do Mon/Tues appts or after 2:30pm on other days. PCP has res24 open on 5/17 at 3:30p - please advise with PCP if ok to book and inform pt. TCM/HFU appt recommended by 5/19/22 as pt is a high risk for readmission. Thank you!     BOOK BY DATE (last date for TCM): 5/26/22

## 2022-05-13 NOTE — TELEPHONE ENCOUNTER
Spoke to patient to advise RX sent as pen - patient stated understanding  Patient stated she has to work Tuesday so f/u with Eugenia Tucker switched to Monday 5/16/22

## 2022-05-13 NOTE — TELEPHONE ENCOUNTER
Nurse  calling to inform that a Rx is needed for Flex Pen or the Syringes, one or the other. Pt has already picked up monitor, vials and pen needles. Pt only has disposable needles right now. Nurse  states that Dr Evens Anthony saw the pt in the hosp and that she is a newly diagnosed diabetic pt.

## 2022-05-16 ENCOUNTER — NURSE ONLY (OUTPATIENT)
Dept: ALLERGY | Facility: CLINIC | Age: 52
End: 2022-05-16
Payer: COMMERCIAL

## 2022-05-16 ENCOUNTER — OFFICE VISIT (OUTPATIENT)
Dept: ALLERGY | Facility: CLINIC | Age: 52
End: 2022-05-16
Payer: COMMERCIAL

## 2022-05-16 ENCOUNTER — OFFICE VISIT (OUTPATIENT)
Dept: ENDOCRINOLOGY CLINIC | Facility: CLINIC | Age: 52
End: 2022-05-16
Payer: COMMERCIAL

## 2022-05-16 ENCOUNTER — PATIENT MESSAGE (OUTPATIENT)
Dept: ENDOCRINOLOGY CLINIC | Facility: CLINIC | Age: 52
End: 2022-05-16

## 2022-05-16 ENCOUNTER — TELEPHONE (OUTPATIENT)
Dept: ENDOCRINOLOGY | Facility: HOSPITAL | Age: 52
End: 2022-05-16

## 2022-05-16 VITALS
HEART RATE: 102 BPM | WEIGHT: 216.19 LBS | BODY MASS INDEX: 34 KG/M2 | DIASTOLIC BLOOD PRESSURE: 88 MMHG | SYSTOLIC BLOOD PRESSURE: 129 MMHG

## 2022-05-16 VITALS
DIASTOLIC BLOOD PRESSURE: 80 MMHG | BODY MASS INDEX: 33.9 KG/M2 | WEIGHT: 216 LBS | TEMPERATURE: 98 F | OXYGEN SATURATION: 99 % | HEIGHT: 67 IN | RESPIRATION RATE: 18 BRPM | HEART RATE: 97 BPM | SYSTOLIC BLOOD PRESSURE: 117 MMHG

## 2022-05-16 DIAGNOSIS — Z51.81 VISIT FOR MONITORING XOLAIR THERAPY: ICD-10-CM

## 2022-05-16 DIAGNOSIS — Z91.018 FOOD ALLERGY: ICD-10-CM

## 2022-05-16 DIAGNOSIS — J30.89 PERENNIAL ALLERGIC RHINITIS WITH SEASONAL VARIATION: ICD-10-CM

## 2022-05-16 DIAGNOSIS — J30.2 PERENNIAL ALLERGIC RHINITIS WITH SEASONAL VARIATION: ICD-10-CM

## 2022-05-16 DIAGNOSIS — R73.9 HYPERGLYCEMIA: Primary | ICD-10-CM

## 2022-05-16 DIAGNOSIS — L50.1 CHRONIC IDIOPATHIC URTICARIA: Primary | ICD-10-CM

## 2022-05-16 DIAGNOSIS — J45.909 EXTRINSIC ASTHMA WITHOUT COMPLICATION, UNSPECIFIED ASTHMA SEVERITY, UNSPECIFIED WHETHER PERSISTENT: ICD-10-CM

## 2022-05-16 DIAGNOSIS — Z79.899 VISIT FOR MONITORING XOLAIR THERAPY: ICD-10-CM

## 2022-05-16 DIAGNOSIS — L50.1 CHRONIC IDIOPATHIC URTICARIA: ICD-10-CM

## 2022-05-16 LAB
GLUCOSE BLOOD: 102
TEST STRIP LOT #: NORMAL NUMERIC

## 2022-05-16 RX ORDER — METFORMIN HYDROCHLORIDE 500 MG/1
500 TABLET, EXTENDED RELEASE ORAL
Qty: 60 TABLET | Refills: 0 | Status: SHIPPED | OUTPATIENT
Start: 2022-05-16

## 2022-05-16 RX ORDER — DULAGLUTIDE 0.75 MG/.5ML
0.75 INJECTION, SOLUTION SUBCUTANEOUS WEEKLY
Qty: 2 ML | Refills: 0 | Status: SHIPPED | OUTPATIENT
Start: 2022-05-16

## 2022-05-16 RX ORDER — PREDNISONE 1 MG/1
TABLET ORAL
Qty: 25 TABLET | Refills: 0 | Status: SHIPPED | OUTPATIENT
Start: 2022-05-16

## 2022-05-16 NOTE — PROGRESS NOTES
Per standing order patient to continue Xolair injections every 28 days- 300 mg. Patient verified name, , and injection to be given. Xolair 150 mg/mL prefilled syringe x2  NDC: 10936-065-25  Lot #: [id number]  Exp Date: 3/2023    At 1018, Xolair 150 mg/mL prefilled syringe administered subcutateously to Right and Left Upper Arms. ?  Patient monitored x 30 min after administration. At [phone], patient released from office without any sign/symptoms of local or systemic reaction.     Patient scheduled next Biologic Injection Appointment for 2022 at 11:15 am.

## 2022-05-16 NOTE — PATIENT INSTRUCTIONS
#1 chronic idiopathic urticaria  No hives in the interim with current Xolair 300 mg every 4 weeks, Xyzal as well as current prednisone. Patient is receiving Xolair dose #3 today followed by 30 minutes of observation without issue or incident. Will attempt to wean prednisone at this time. Current dosing is 10 mg twice a day. We will attempt to decrease to 10 mg once a day for 1 week followed by 5 mg once a day x1 week followed by 2.5 mg once a day x1 week. If hives worsen would recommend increase Xyzal from once a day up to 4 times per day and attempt to avoid prednisone  Xolair dose #3 given in office today      #2 asthma  Denies active or persistent symptoms more than 2 days/week. No ED visits or prednisone in the interim for asthma  Continue with albuterol as needed  Reviewed signs and symptoms of persistent asthma clean the rules of 2    #3 Food allergy still avoiding shellfish. No issues in the interim. EpiPen up-to-date    #4 allergic rhinitis  Reviewed avoidance measures and potential treatment option of immunotherapy.   Continue with current medications      Follow-up in 1 month or sooner if needed

## 2022-05-17 ENCOUNTER — HOSPITAL ENCOUNTER (OUTPATIENT)
Dept: ENDOCRINOLOGY | Facility: HOSPITAL | Age: 52
Discharge: HOME | End: 2022-05-17
Attending: INTERNAL MEDICINE
Payer: COMMERCIAL

## 2022-05-17 ENCOUNTER — TELEPHONE (OUTPATIENT)
Dept: ENDOCRINOLOGY | Facility: HOSPITAL | Age: 52
End: 2022-05-17

## 2022-05-17 VITALS — BODY MASS INDEX: 34 KG/M2 | WEIGHT: 216 LBS

## 2022-05-17 DIAGNOSIS — E11.65 TYPE 2 DIABETES MELLITUS WITH HYPERGLYCEMIA, WITHOUT LONG-TERM CURRENT USE OF INSULIN (HCC): Primary | ICD-10-CM

## 2022-05-17 RX ORDER — METFORMIN HYDROCHLORIDE 500 MG/1
500 TABLET, EXTENDED RELEASE ORAL 2 TIMES DAILY WITH MEALS
Qty: 60 TABLET | Refills: 1 | Status: SHIPPED | OUTPATIENT
Start: 2022-05-17

## 2022-05-17 NOTE — TELEPHONE ENCOUNTER
Jennifer    Pt seen yesterday start started on Metformin and Trulicity 1.53SN (to 2.5WI afer 1 mo) and decreased 70/30 to 24 BID    Notes states Metformin 500mg BID  Rx states Metformin 500mg daily with breakfast

## 2022-05-17 NOTE — TELEPHONE ENCOUNTER
From: Lachelle Zavala  To: ANNAMARIE Villarreal  Sent: 5/16/2022 7:29 PM CDT  Subject: Metformin    Hi just wanted to let you know I got the medicine , but for the Metformin caitlin gave me 30 pills to take once a day a breakfast not 60 pills to take 2x a day . ..

## 2022-05-17 NOTE — TELEPHONE ENCOUNTER
Leonard Rodriguez,    Could you please sign the pended order. Butch Naranjo forgot to bring hers paper copy and the order in Epic is from her inpatient visit.     Thank you,  Karen Mcgrath

## 2022-05-17 NOTE — PROGRESS NOTES
Rajeev Rios  : 1970 attended individual initial assessment for Diabetes Education:  Steps 1/2    Date: 2022   Start time: 0503  End time: 1650      HEMOGLOBIN A1C (%)   Date Value   05/10/2022 >14     HgbA1C (%)   Date Value   05/10/2022 13.2 (H)        Assessment: New onset type 2, was in hospital when diagnosed; met with RN, Felisa Diaz BG Results-  Started yesterday  FBG- 183  predinner- 111, lower than FBG      Meds  Nieves- 70/30- 34 units bid, is being weaned down over the next 3 weeks. Metformin  Started Trulicity on Mon       Diet Hx: Used to eat 2 meals per day  B: [place] yogurt and berries, used to be a sausage biscuit from [employer]  L:    Used to drink ginger ale, sugared iced tea, and Red Bull but has stopped. Education provided on the below topics:     Diabetes Overview, pathophysiology, A1C results and treatment options for diabetes self-management:   Described basic process and treatment options for diabetes self-management. Healthy Eating:  Obtained usual diet history. Instructed on Basic Diet Guidelines which includes eating 3 meals/day with HS snack. Eat moderate amounts at consistent times from day to day. Limit/avoid sweets. Discussed balanced plate and started basic carb counting with target of 45 gms per meal.    Being Active: Encouraged Physical Activity    Taking Medications: Reviewed current diabetes medications (if applicable)    Monitoring: Instructed/reinforced blood glucose monitoring, testing schedules and target goals:   Fasting / Pre-meal  mg/dL 2 Hour Post-prandial <180 mg/dL  Demonstrated ability to perform blood glucose testing. Problem Solving: Prevention, detection and treatment of acute complications: taught symptoms of hypoglycemia, hyperglycemia, how to treat low blood sugar (Rule of 15) and actions for lowering high blood glucose levels. Initiated goal setting process and will continue to refine throughout class series.       Recommendations: Monitor blood glucose as directed. Follow Basic Diet Guidelines. Follow up set in 3 weeks. Written materials provided for all areas covered. Patient verbalized understanding and has no further questions at this time.       Nesha Sow RD

## 2022-05-18 NOTE — TELEPHONE ENCOUNTER
Jennifer,     Please see below patient's message. She states she mentioned the swelling on her ankles and hands during her LOV with you. She is denying chest pain, SOB. States this started when she started with insulin. She is still on tapering dose of prednisone. RN did mention that sometimes prednisone can cause swelling. She's asking if there are any OTC medication she can take to help with it. Please advise. Thank you.

## 2022-05-19 ENCOUNTER — OFFICE VISIT (OUTPATIENT)
Dept: FAMILY MEDICINE CLINIC | Facility: CLINIC | Age: 52
End: 2022-05-19
Payer: COMMERCIAL

## 2022-05-19 VITALS
DIASTOLIC BLOOD PRESSURE: 80 MMHG | HEART RATE: 103 BPM | HEIGHT: 67 IN | WEIGHT: 216 LBS | SYSTOLIC BLOOD PRESSURE: 139 MMHG | BODY MASS INDEX: 33.9 KG/M2

## 2022-05-19 DIAGNOSIS — I10 ESSENTIAL HYPERTENSION: ICD-10-CM

## 2022-05-19 DIAGNOSIS — E11.9 TYPE 2 DIABETES MELLITUS WITHOUT COMPLICATION, WITH LONG-TERM CURRENT USE OF INSULIN (HCC): Primary | ICD-10-CM

## 2022-05-19 DIAGNOSIS — Z79.4 TYPE 2 DIABETES MELLITUS WITHOUT COMPLICATION, WITH LONG-TERM CURRENT USE OF INSULIN (HCC): Primary | ICD-10-CM

## 2022-05-19 DIAGNOSIS — L50.9 HIVES: ICD-10-CM

## 2022-05-19 PROBLEM — E11.65 TYPE 2 DIABETES MELLITUS WITH HYPERGLYCEMIA (HCC): Status: ACTIVE | Noted: 2022-05-19

## 2022-05-19 PROCEDURE — 3075F SYST BP GE 130 - 139MM HG: CPT | Performed by: FAMILY MEDICINE

## 2022-05-19 PROCEDURE — 99495 TRANSJ CARE MGMT MOD F2F 14D: CPT | Performed by: FAMILY MEDICINE

## 2022-05-19 PROCEDURE — 3008F BODY MASS INDEX DOCD: CPT | Performed by: FAMILY MEDICINE

## 2022-05-19 PROCEDURE — 3079F DIAST BP 80-89 MM HG: CPT | Performed by: FAMILY MEDICINE

## 2022-05-19 RX ORDER — HYDROCHLOROTHIAZIDE 25 MG/1
25 TABLET ORAL DAILY PRN
Qty: 30 TABLET | Refills: 0 | Status: SHIPPED | OUTPATIENT
Start: 2022-05-19

## 2022-05-19 RX ORDER — HYDROCHLOROTHIAZIDE 25 MG/1
TABLET ORAL
Qty: 90 TABLET | Refills: 0 | OUTPATIENT
Start: 2022-05-19

## 2022-05-20 ENCOUNTER — TELEPHONE (OUTPATIENT)
Dept: ENDOCRINOLOGY | Facility: HOSPITAL | Age: 52
End: 2022-05-20

## 2022-05-23 NOTE — TELEPHONE ENCOUNTER
Please see my chart msg from dated 5/23l22. Patient has had some hypoglycemic episodes in the past 2 days. Please advice, Thank you.

## 2022-05-31 ENCOUNTER — APPOINTMENT (OUTPATIENT)
Dept: ENDOCRINOLOGY | Facility: HOSPITAL | Age: 52
End: 2022-05-31
Attending: INTERNAL MEDICINE
Payer: COMMERCIAL

## 2022-05-31 RX ORDER — DULAGLUTIDE 1.5 MG/.5ML
1.5 INJECTION, SOLUTION SUBCUTANEOUS WEEKLY
Qty: 2 ML | Refills: 3 | Status: SHIPPED | OUTPATIENT
Start: 2022-05-31

## 2022-05-31 NOTE — TELEPHONE ENCOUNTER
Jennifer,    Please see attached glucose log.  Decreased to 12 units BID for Novolin 70/30 on May 26th    Metformin 404PV BID  Trulicity 1.71QZ/TVISVE with increasing to 1.5mg after 1mo

## 2022-06-03 ENCOUNTER — HOSPITAL ENCOUNTER (OUTPATIENT)
Dept: ENDOCRINOLOGY | Facility: HOSPITAL | Age: 52
Discharge: HOME | End: 2022-06-03
Attending: INTERNAL MEDICINE
Payer: COMMERCIAL

## 2022-06-03 VITALS — BODY MASS INDEX: 32 KG/M2 | WEIGHT: 206.63 LBS

## 2022-06-03 DIAGNOSIS — E11.65 TYPE 2 DIABETES MELLITUS WITH HYPERGLYCEMIA, WITHOUT LONG-TERM CURRENT USE OF INSULIN (HCC): Primary | ICD-10-CM

## 2022-06-03 NOTE — PROGRESS NOTES
Kayla Balbuena  YLM31/ attended Step 3 Group Diabetes Education Class: Individual    Date: 6/3/2022  Start time: 7884  End time: 3204    Wt: Wt Readings from Last 1 Encounters:  22 : 206 lb 9.6 oz    Weight change since start of program: 10# loss      Blood Glucose: Controlled: Stable       FB-124  Predinner:      Pt has been on the Trulicity 0.2PI for about 3 weeks and says she may come off insulin at her next endo appt in . She states that the Trulicity has really reduced her appetite; is tempted to skip meals but tries to eat a small amount. Education:    Healthy Eating:  Reviewed Basic Diet Guidelines as foundation of diabetes meal planning. Reinforced the balanced plate method. Taught nutrition basics defining carbohydrate, protein, and fat. Taught label reading, including an option to count carbohydrate grams or servings. Provided patient with goals for carbohydrate grams/choices for meals and snacks. Discussed sugar substitutes, ETOH and effect on blood glucose. Guerita's helpful for smart phones, as well as websites for examining carbohydrate levels provided. Reviewed and reinforced macronutrient's, carbohydrate counting and meal planning. Problem Solving:  Reinforced signs, symptoms, and treatment of hypoglycemia using the Rule of 15. Discussed impact of different food items and portion sizes on blood glucose levels. Discussed blood glucose target of 180 mg/dL, if testing 2 hours post meal.    Monitoring:  Reviewed blood glucose records and importance of tracking foods/blood glucose levels. Reinforced the importance of taking medications as prescribed. Behavior Change:  Reviewed and updated individual goals and action plan set by patient. Addressed barriers to tracking foods/blood glucose levels and following guidelines presented/achieving goals, as a group discussion. Recommendations: Follow individual meal plan recommended by Educator (Lillie Hall).   Continue implementing individual goals. Attend remaining sessions. Begin implementing carbohydrate counting and continue dietary and blood glucose tracking. Written materials provided for all areas covered. Patient verbalized understanding and has no further questions currently     Follow up for Steps 4/5 in 4 weeks.       Marco A Rivero RD

## 2022-06-07 ENCOUNTER — TELEPHONE (OUTPATIENT)
Dept: ALLERGY | Facility: CLINIC | Age: 52
End: 2022-06-07

## 2022-06-07 NOTE — TELEPHONE ENCOUNTER
[facility] contacted at [phone]. Attempted to schedule Xolair delivery. Informed by rep that patient's Xolair coverage is still in insurance verification. Patient's Xolair injection appointment is scheduled for 6/13/2022. Will attempt to schedule Xolair delivery 6/8/2022.

## 2022-06-08 NOTE — TELEPHONE ENCOUNTER
[facility] contacted at [phone]. Spoke with pharmacy , Sandra Kang. Attempted to schedule deliver of Xolair to physician's office. Rep offers that patient's Xolair coverage has not yet been verified for insurance. Rep contacted insurance verification department. Insurance verification department is to determine Xolair coverage STAT and call nurse today to schedule delivery of Xolair today, to be then delivered overnight.

## 2022-06-08 NOTE — TELEPHONE ENCOUNTER
RN called [facility]. Spoke with Genet Fowler, she said she can get it delivered by Friday morning via [address] RN explained importance of it being delivered by Friday morning and no later than 12:45 as a nurse will not be in the office after that time until Monday morning. She assured me that it would be delivered Friday morning and if it was not to call them  and check. RN confirmed dose and shipping address.     Scheduled for delivery on Friday morning 6/10

## 2022-06-13 ENCOUNTER — OFFICE VISIT (OUTPATIENT)
Dept: ALLERGY | Facility: CLINIC | Age: 52
End: 2022-06-13
Payer: COMMERCIAL

## 2022-06-13 ENCOUNTER — NURSE ONLY (OUTPATIENT)
Dept: ALLERGY | Facility: CLINIC | Age: 52
End: 2022-06-13
Payer: COMMERCIAL

## 2022-06-13 VITALS
RESPIRATION RATE: 18 BRPM | HEART RATE: 104 BPM | SYSTOLIC BLOOD PRESSURE: 126 MMHG | OXYGEN SATURATION: 98 % | DIASTOLIC BLOOD PRESSURE: 86 MMHG

## 2022-06-13 DIAGNOSIS — Z91.018 FOOD ALLERGY: ICD-10-CM

## 2022-06-13 DIAGNOSIS — L50.1 CHRONIC IDIOPATHIC URTICARIA: ICD-10-CM

## 2022-06-13 DIAGNOSIS — J30.89 PERENNIAL ALLERGIC RHINITIS WITH SEASONAL VARIATION: ICD-10-CM

## 2022-06-13 DIAGNOSIS — J45.20 MILD INTERMITTENT EXTRINSIC ASTHMA WITHOUT COMPLICATION: ICD-10-CM

## 2022-06-13 DIAGNOSIS — Z79.899 VISIT FOR MONITORING XOLAIR THERAPY: Primary | ICD-10-CM

## 2022-06-13 DIAGNOSIS — J30.2 PERENNIAL ALLERGIC RHINITIS WITH SEASONAL VARIATION: ICD-10-CM

## 2022-06-13 DIAGNOSIS — Z51.81 VISIT FOR MONITORING XOLAIR THERAPY: Primary | ICD-10-CM

## 2022-06-13 NOTE — PROGRESS NOTES
Xolair Progress Note:     Pre-treatment Peak Flow: NA  Post treatment Peak Flow: NA  See \"vitals\" flow sheet for vital sign detail. See MAR for injection detail. Per standing order pt to continue Xolair injections every 4  weeks 300 mg. PT verified name, , and injection to be given. Xolair 150 mg SC given upper right and left arms at 1151. LOT: [id number]  EXP: 2023        Patient Status: Patient d/c'd home 30 minutes s/p Xolair injections for idopathic urticaria. Patient  tolerates injection without complications. Site WNL. No further questions or concerns at this time. Return to clinic for next injection as advised by Dr. Le Mccauley.      Time d/c to home 1211  Next appointment scheduled for: 2022

## 2022-06-16 RX ORDER — AMLODIPINE BESYLATE 5 MG/1
5 TABLET ORAL DAILY
Qty: 90 TABLET | Refills: 1 | Status: SHIPPED | OUTPATIENT
Start: 2022-06-16

## 2022-06-16 NOTE — TELEPHONE ENCOUNTER
Refill passed per [facility] protocol   Requested Prescriptions   Pending Prescriptions Disp Refills    AMLODIPINE 5 MG Oral Tab [Pharmacy Med Name: AMLODIPINE BESYLATE 5MG TABLETS] 90 tablet 1     Sig: TAKE 1 TABLET(5 MG) BY MOUTH DAILY        Hypertensive Medications Protocol Passed - 6/16/2022  5:27 AM        Passed - CMP or BMP in past 12 months        Passed - Appointment in past 6 or next 3 months        Passed - GFR  > 50     Lab Results   Component Value Date    GFRAA 118 05/12/2022

## 2022-06-20 ENCOUNTER — OFFICE VISIT (OUTPATIENT)
Dept: ENDOCRINOLOGY CLINIC | Facility: CLINIC | Age: 52
End: 2022-06-20
Payer: COMMERCIAL

## 2022-06-20 VITALS
HEART RATE: 103 BPM | SYSTOLIC BLOOD PRESSURE: 132 MMHG | BODY MASS INDEX: 32 KG/M2 | WEIGHT: 204.38 LBS | DIASTOLIC BLOOD PRESSURE: 86 MMHG

## 2022-06-20 DIAGNOSIS — R73.9 HYPERGLYCEMIA: Primary | ICD-10-CM

## 2022-06-20 DIAGNOSIS — E11.65 UNCONTROLLED TYPE 2 DIABETES MELLITUS WITH HYPERGLYCEMIA (HCC): ICD-10-CM

## 2022-06-20 LAB
CARTRIDGE LOT#: ABNORMAL NUMERIC
GLUCOSE BLOOD: 137
HEMOGLOBIN A1C: 9.5 % (ref 4.3–5.6)
TEST STRIP LOT #: NORMAL NUMERIC

## 2022-06-20 PROCEDURE — 3075F SYST BP GE 130 - 139MM HG: CPT

## 2022-06-20 PROCEDURE — 83036 HEMOGLOBIN GLYCOSYLATED A1C: CPT

## 2022-06-20 PROCEDURE — 3046F HEMOGLOBIN A1C LEVEL >9.0%: CPT

## 2022-06-20 PROCEDURE — 99214 OFFICE O/P EST MOD 30 MIN: CPT

## 2022-06-20 PROCEDURE — 82947 ASSAY GLUCOSE BLOOD QUANT: CPT

## 2022-06-20 PROCEDURE — 3079F DIAST BP 80-89 MM HG: CPT

## 2022-06-28 RX ORDER — HYDROCHLOROTHIAZIDE 25 MG/1
25 TABLET ORAL DAILY PRN
Qty: 90 TABLET | Refills: 0 | Status: SHIPPED | OUTPATIENT
Start: 2022-06-28 | End: 2022-08-01

## 2022-07-02 ENCOUNTER — LAB ENCOUNTER (OUTPATIENT)
Dept: LAB | Facility: HOSPITAL | Age: 52
End: 2022-07-02
Payer: COMMERCIAL

## 2022-07-02 ENCOUNTER — HOSPITAL ENCOUNTER (OUTPATIENT)
Dept: ENDOCRINOLOGY | Facility: HOSPITAL | Age: 52
Discharge: HOME | End: 2022-07-02
Attending: INTERNAL MEDICINE
Payer: COMMERCIAL

## 2022-07-02 VITALS — WEIGHT: 198.63 LBS | BODY MASS INDEX: 31 KG/M2

## 2022-07-02 DIAGNOSIS — E11.65 TYPE 2 DIABETES MELLITUS WITH HYPERGLYCEMIA, WITHOUT LONG-TERM CURRENT USE OF INSULIN (HCC): ICD-10-CM

## 2022-07-02 DIAGNOSIS — E11.65 UNCONTROLLED TYPE 2 DIABETES MELLITUS WITH HYPERGLYCEMIA (HCC): ICD-10-CM

## 2022-07-02 LAB
ALBUMIN SERPL-MCNC: 4.2 G/DL (ref 3.4–5)
ALBUMIN/GLOB SERPL: 0.9 {RATIO} (ref 1–2)
ALP LIVER SERPL-CCNC: 52 U/L
ALT SERPL-CCNC: 34 U/L
ANION GAP SERPL CALC-SCNC: 5 MMOL/L (ref 0–18)
AST SERPL-CCNC: 30 U/L (ref 15–37)
BILIRUB SERPL-MCNC: 0.4 MG/DL (ref 0.1–2)
BUN BLD-MCNC: 7 MG/DL (ref 7–18)
BUN/CREAT SERPL: 10.4 (ref 10–20)
CALCIUM BLD-MCNC: 9.4 MG/DL (ref 8.5–10.1)
CHLORIDE SERPL-SCNC: 105 MMOL/L (ref 98–112)
CHOLEST SERPL-MCNC: 169 MG/DL (ref ?–200)
CO2 SERPL-SCNC: 27 MMOL/L (ref 21–32)
CREAT BLD-MCNC: 0.67 MG/DL
CREAT UR-SCNC: 160 MG/DL
FASTING PATIENT LIPID ANSWER: YES
FASTING STATUS PATIENT QL REPORTED: YES
GLOBULIN PLAS-MCNC: 4.5 G/DL (ref 2.8–4.4)
GLUCOSE BLD-MCNC: 88 MG/DL (ref 70–99)
HDLC SERPL-MCNC: 58 MG/DL (ref 40–59)
LDLC SERPL CALC-MCNC: 97 MG/DL (ref ?–100)
MICROALBUMIN UR-MCNC: 1.87 MG/DL
MICROALBUMIN/CREAT 24H UR-RTO: 11.7 UG/MG (ref ?–30)
NONHDLC SERPL-MCNC: 111 MG/DL (ref ?–130)
OSMOLALITY SERPL CALC.SUM OF ELEC: 281 MOSM/KG (ref 275–295)
POTASSIUM SERPL-SCNC: 3.4 MMOL/L (ref 3.5–5.1)
PROT SERPL-MCNC: 8.7 G/DL (ref 6.4–8.2)
SODIUM SERPL-SCNC: 137 MMOL/L (ref 136–145)
TRIGL SERPL-MCNC: 71 MG/DL (ref 30–149)
VLDLC SERPL CALC-MCNC: 12 MG/DL (ref 0–30)

## 2022-07-02 PROCEDURE — 82570 ASSAY OF URINE CREATININE: CPT

## 2022-07-02 PROCEDURE — 3061F NEG MICROALBUMINURIA REV: CPT

## 2022-07-02 PROCEDURE — 82043 UR ALBUMIN QUANTITATIVE: CPT

## 2022-07-02 PROCEDURE — 36415 COLL VENOUS BLD VENIPUNCTURE: CPT

## 2022-07-02 PROCEDURE — 80061 LIPID PANEL: CPT

## 2022-07-02 PROCEDURE — 80053 COMPREHEN METABOLIC PANEL: CPT

## 2022-07-02 NOTE — PROGRESS NOTES
Alyssa Taylor  DOB11/30/1970 attended Step 4/5  Diabetes Education: 1:1     Date: 7/2/2022  Start time: 36  End time: 18    Wt: Wt Readings from Last 1 Encounters:  06/20/22 : 204 lb 6.4 oz    Weight change since start of program: 17.4#    Blood Glucose: Controlled: Stable, all in target levels per [employer] phone rupinder        Reviewed information covered in previous classes including benefits of maintaining good blood glucose control and healthy weight in decreasing diabetes complications. Prevention, detection, and treatment of chronic complications  Reviewed how to reduce risks of complications including eye, foot, dental, renal, and cardiovascular. Discussed American Diabetes Association guidelines for testing including eye exam, lipid panels, urine protein tests, dental and foot exams. Encouraged to get annual flu shot. Discussed importance of immunizations, vaccinations, and guidelines for sick day management. Problem Solving  Discussed signs, symptoms, and prevention of DKA and HHNS. Discussed disaster preparedness and Diabetes. Discussed Diabetes medication assistance and supply management. Healthy Eating  Reviewed and reinforced macronutrients, carbohydrate counting, and meal planning. Discussed healthy eating approaches for dining out. Taught principles of heart healthy eating (fats, cholesterol, fiber, and sodium intake). Taught label guidelines for choosing fat controlled proteins, snacks and desserts. Discussed how to integrate information for meal planning. Medication   Guidelines for cholesterol medications for persons with Diabetes. Healthy Coping  Instructed on developing and implementing a support plan. Discussed avoiding Diabetes burnout, dealing with stress and stress reduction techniques. Discussed recognizing and dealing with depression and diabetes. Being Active  Reinforced the benefits of exercise and safety precautions.  Discussed the effects of blood glucose levels and role in weight loss. Discussed strategies for increasing activity and maintaining regular exercise regimen. Problem Solving  List of community resources provided and discussed. Behavior Change  Reviewed and updated individual goals set by patient. Action plan completed and Diabetes support plan initiated    Patient has identified the following goal(s) and actions related to: Healthy Eating  Diabetes goal assessment and action plan scanned into Media  Kayla Balbuena has chosen the following ongoing Diabetes Support Plan: The use of Diabetes Websites or Apps       Written materials provided for all areas covered. Patient verbalized understanding and has no further questions at this time.        Anny Fleming RD, [facility]

## 2022-07-06 ENCOUNTER — TELEPHONE (OUTPATIENT)
Dept: ALLERGY | Facility: CLINIC | Age: 52
End: 2022-07-06

## 2022-07-06 RX ORDER — METFORMIN HYDROCHLORIDE 500 MG/1
TABLET, EXTENDED RELEASE ORAL
Qty: 180 TABLET | Refills: 0 | Status: SHIPPED | OUTPATIENT
Start: 2022-07-06

## 2022-07-06 NOTE — TELEPHONE ENCOUNTER
Marlys from [facility] calling to set up delivery. Delivery set for 7/7/2022  Xolair 150 PFS x2 syringes for 28 day supply. Two refills remain.

## 2022-07-11 ENCOUNTER — NURSE ONLY (OUTPATIENT)
Dept: ALLERGY | Facility: CLINIC | Age: 52
End: 2022-07-11
Payer: COMMERCIAL

## 2022-07-11 VITALS — SYSTOLIC BLOOD PRESSURE: 131 MMHG | OXYGEN SATURATION: 98 % | DIASTOLIC BLOOD PRESSURE: 88 MMHG | HEART RATE: 88 BPM

## 2022-07-11 DIAGNOSIS — L50.1 CHRONIC IDIOPATHIC URTICARIA: ICD-10-CM

## 2022-07-11 NOTE — PROGRESS NOTES
Xolair Progress Note:     See \"vitals\" flow sheet for vital sign detail. See MAR for injection detail. Per standing order pt to continue Xolair injections every 4 weeks 300 mg. PT verified name, , and injection to be given. Xolair 150 mg SC given upper right and left arms at 1400. Xolair Exp: 2023  Xolair Lot #[id number]    Patient Status: Patient d/c'd home 30 minutes s/p Xolair injections for idopathic urticaria. Patient  tolerates injection without complications. Site WNL. No further questions or concerns at this time. Return to clinic for next injection as advised by Dr. Uriel Oleary.      Time d/c to home 1430  Next appointment scheduled for: 2022

## 2022-07-20 ENCOUNTER — PATIENT MESSAGE (OUTPATIENT)
Dept: ENDOCRINOLOGY CLINIC | Facility: CLINIC | Age: 52
End: 2022-07-20

## 2022-07-20 NOTE — TELEPHONE ENCOUNTER
From: Rajeev Rios  To: ANNAMARIE Verduzco  Sent: 7/20/2022 7:58 AM CDT  Subject: Glucose levels     Just wanted to share glucose levels with you .

## 2022-07-20 NOTE — TELEPHONE ENCOUNTER
Virgen Juarez,    Last seen 6/20/2022 (recently dx 5/2022) with a1c improving with 9.5%  Current Medications:  - Metformin  PO BID  - Trulicity 1.9KE subcutaneous weekly

## 2022-08-01 ENCOUNTER — OFFICE VISIT (OUTPATIENT)
Dept: ENDOCRINOLOGY CLINIC | Facility: CLINIC | Age: 52
End: 2022-08-01
Payer: COMMERCIAL

## 2022-08-01 VITALS
WEIGHT: 198 LBS | HEART RATE: 91 BPM | BODY MASS INDEX: 31 KG/M2 | DIASTOLIC BLOOD PRESSURE: 85 MMHG | SYSTOLIC BLOOD PRESSURE: 126 MMHG

## 2022-08-01 DIAGNOSIS — E11.65 UNCONTROLLED TYPE 2 DIABETES MELLITUS WITH HYPERGLYCEMIA (HCC): Primary | ICD-10-CM

## 2022-08-01 LAB
CARTRIDGE LOT#: ABNORMAL NUMERIC
GLUCOSE BLOOD: 108
HEMOGLOBIN A1C: 6.4 % (ref 4.3–5.6)
TEST STRIP LOT #: NORMAL NUMERIC

## 2022-08-01 PROCEDURE — 83036 HEMOGLOBIN GLYCOSYLATED A1C: CPT

## 2022-08-01 PROCEDURE — 3044F HG A1C LEVEL LT 7.0%: CPT

## 2022-08-01 PROCEDURE — 3079F DIAST BP 80-89 MM HG: CPT

## 2022-08-01 PROCEDURE — 99214 OFFICE O/P EST MOD 30 MIN: CPT

## 2022-08-01 PROCEDURE — 3074F SYST BP LT 130 MM HG: CPT

## 2022-08-01 PROCEDURE — 82947 ASSAY GLUCOSE BLOOD QUANT: CPT

## 2022-08-01 RX ORDER — HYDROCHLOROTHIAZIDE 25 MG/1
25 TABLET ORAL DAILY PRN
Qty: 90 TABLET | Refills: 0 | Status: SHIPPED | OUTPATIENT
Start: 2022-08-01

## 2022-08-05 ENCOUNTER — TELEPHONE (OUTPATIENT)
Dept: ALLERGY | Facility: CLINIC | Age: 52
End: 2022-08-05

## 2022-08-05 NOTE — TELEPHONE ENCOUNTER
[facility] contacted at [phone], spoke with pharmacy , Edilberto Monroy. Xolair 150 mg/mL prefilled syringe x2 scheduled for delivery: 8/9//2022    Patient's next Xolair injection appt 8/8/2022, patient either has one remaining Xolair dose in med refrigerator or Sample will need to be used for 8/8/2022 appt.

## 2022-08-08 ENCOUNTER — NURSE ONLY (OUTPATIENT)
Dept: ALLERGY | Facility: CLINIC | Age: 52
End: 2022-08-08
Payer: COMMERCIAL

## 2022-08-08 VITALS
DIASTOLIC BLOOD PRESSURE: 89 MMHG | SYSTOLIC BLOOD PRESSURE: 128 MMHG | HEART RATE: 96 BPM | BODY MASS INDEX: 31 KG/M2 | OXYGEN SATURATION: 98 % | WEIGHT: 198 LBS

## 2022-08-08 DIAGNOSIS — L50.1 IDIOPATHIC URTICARIA: ICD-10-CM

## 2022-08-08 NOTE — PROGRESS NOTES
Patient presents to the office for Xolair injections for chronic hives. See vital sign template for vital signs. Xolair 150 mg given left upper arm subcutaneously. Xolair 150 mg given right upper arm subcutaneously. Patient was observed in the office for 30 minutes, was reassessed and was released without complications. Cara Steven LOT# [id number]               EXP.02/2023               [address] [id number]    NEXT APPOINTMENT-9/6/22 with Dr. Arlette Zhang and Xolair injections.

## 2022-08-16 RX ORDER — OMALIZUMAB 150 MG/ML
300 INJECTION, SOLUTION SUBCUTANEOUS
Qty: 2 ML | Refills: 2 | Status: SHIPPED | OUTPATIENT
Start: 2022-08-16

## 2022-08-23 RX ORDER — OMALIZUMAB 150 MG/ML
300 INJECTION, SOLUTION SUBCUTANEOUS
Qty: 2 ML | Refills: 2 | Status: SHIPPED | OUTPATIENT
Start: 2022-08-23

## 2022-08-24 ENCOUNTER — PATIENT MESSAGE (OUTPATIENT)
Dept: ALLERGY | Facility: CLINIC | Age: 52
End: 2022-08-24

## 2022-08-25 NOTE — TELEPHONE ENCOUNTER
RN reached out to pt to see whether she intended to send message about bladder pain to our office or to see whether it was intended to be sent to her PCP. RN advised she reach out to PCP for advisement regarding bladder pain. From: Rl Fleming  To: Lanie Velasquez MD  Sent: 8/24/2022  9:14 PM CDT  Subject: Bladder pain    I have been having bladder pains for the past few days . ..not painful urination, just pain . Genie Irvin Genie Irvin It's not sexual because I haven't been sexual ,  I did drink a lil ( a lot on Sunday ) my numbers have been good , seun if I'm not drinking enough water . ..

## 2022-08-29 ENCOUNTER — TELEPHONE (OUTPATIENT)
Dept: ALLERGY | Facility: CLINIC | Age: 52
End: 2022-08-29

## 2022-08-29 NOTE — TELEPHONE ENCOUNTER
Request to switch 9/6/2022 at 2:30 pm to f/u appt at 1:45 (as Dr. Aubrey Sexton needs 2:30 appt if possible for urgent Consult need. Message left on patient's voicemail asking if she would be willing to move her f/u appt on 9/6/2022 to the 1:45 f/u appt slot. Patient asked to call the Allergy office at her earliest convenience. Awaiting patient's return call.

## 2022-08-30 ENCOUNTER — TELEPHONE (OUTPATIENT)
Dept: ALLERGY | Facility: CLINIC | Age: 52
End: 2022-08-30

## 2022-09-07 RX ORDER — DULAGLUTIDE 1.5 MG/.5ML
1.5 INJECTION, SOLUTION SUBCUTANEOUS WEEKLY
Qty: 2 ML | Refills: 3 | Status: SHIPPED | OUTPATIENT
Start: 2022-09-07 | End: 2022-10-04

## 2022-09-22 ENCOUNTER — TELEPHONE (OUTPATIENT)
Dept: ALLERGY | Facility: CLINIC | Age: 52
End: 2022-09-22

## 2022-09-22 NOTE — TELEPHONE ENCOUNTER
Suzette Kimbrough from [facility] on the phone will like to receive a call back to schedule an appointment for delievery for the following medication to the office.  Please advise    Omalizumab Elijah Mcgarry) 150 MG/ML Subcutaneous Solution Prefilled Syringe

## 2022-09-23 NOTE — TELEPHONE ENCOUNTER
[facility] contacted at [phone], spoke with pharmacy , Renetta Nix. Asked rep to placed Xolair order on hold as patient has a remaining Xolair dose available in the physician's office as patient did not show for her 9/6/2022 Xolair injection appt.

## 2022-10-06 RX ORDER — DULAGLUTIDE 1.5 MG/.5ML
1.5 INJECTION, SOLUTION SUBCUTANEOUS WEEKLY
Qty: 2 ML | Refills: 3 | Status: SHIPPED | OUTPATIENT
Start: 2022-10-06

## 2022-10-06 NOTE — TELEPHONE ENCOUNTER
LOV: 8/1/2022    RTC: 4 months     F/U: 12/5/2022    CHEVY Rodriguez-- orders pending, approve if agreeable.

## 2022-11-14 RX ORDER — BLOOD SUGAR DIAGNOSTIC
STRIP MISCELLANEOUS
Qty: 200 STRIP | Refills: 0 | Status: SHIPPED | OUTPATIENT
Start: 2022-11-14

## 2022-12-06 ENCOUNTER — OFFICE VISIT (OUTPATIENT)
Dept: ENDOCRINOLOGY CLINIC | Facility: CLINIC | Age: 52
End: 2022-12-06
Payer: COMMERCIAL

## 2022-12-06 VITALS
WEIGHT: 193 LBS | DIASTOLIC BLOOD PRESSURE: 87 MMHG | BODY MASS INDEX: 30 KG/M2 | HEART RATE: 97 BPM | SYSTOLIC BLOOD PRESSURE: 131 MMHG

## 2022-12-06 DIAGNOSIS — E11.65 UNCONTROLLED TYPE 2 DIABETES MELLITUS WITH HYPERGLYCEMIA (HCC): Primary | ICD-10-CM

## 2022-12-06 LAB
CARTRIDGE LOT#: ABNORMAL NUMERIC
HEMOGLOBIN A1C: 5.8 % (ref 4.3–5.6)

## 2022-12-06 PROCEDURE — 99214 OFFICE O/P EST MOD 30 MIN: CPT

## 2022-12-06 PROCEDURE — 83036 HEMOGLOBIN GLYCOSYLATED A1C: CPT

## 2022-12-06 PROCEDURE — 3044F HG A1C LEVEL LT 7.0%: CPT

## 2022-12-06 PROCEDURE — 3079F DIAST BP 80-89 MM HG: CPT

## 2022-12-06 PROCEDURE — 3075F SYST BP GE 130 - 139MM HG: CPT

## 2023-03-06 ENCOUNTER — OFFICE VISIT (OUTPATIENT)
Dept: ENDOCRINOLOGY CLINIC | Facility: CLINIC | Age: 53
End: 2023-03-06

## 2023-03-06 VITALS
WEIGHT: 190 LBS | HEART RATE: 105 BPM | DIASTOLIC BLOOD PRESSURE: 90 MMHG | SYSTOLIC BLOOD PRESSURE: 129 MMHG | BODY MASS INDEX: 30 KG/M2

## 2023-03-06 DIAGNOSIS — E11.9 CONTROLLED TYPE 2 DIABETES MELLITUS WITHOUT COMPLICATION, WITHOUT LONG-TERM CURRENT USE OF INSULIN (HCC): Primary | ICD-10-CM

## 2023-03-06 LAB
CARTRIDGE LOT#: NORMAL NUMERIC
GLUCOSE BLOOD: 105
HEMOGLOBIN A1C: 5.6 % (ref 4.3–5.6)
TEST STRIP LOT #: NORMAL NUMERIC

## 2023-03-06 PROCEDURE — 3074F SYST BP LT 130 MM HG: CPT

## 2023-03-06 PROCEDURE — 3080F DIAST BP >= 90 MM HG: CPT

## 2023-03-06 PROCEDURE — 83036 HEMOGLOBIN GLYCOSYLATED A1C: CPT

## 2023-03-06 PROCEDURE — 3044F HG A1C LEVEL LT 7.0%: CPT

## 2023-03-06 PROCEDURE — 99214 OFFICE O/P EST MOD 30 MIN: CPT

## 2023-03-06 PROCEDURE — 82947 ASSAY GLUCOSE BLOOD QUANT: CPT

## 2023-03-06 RX ORDER — DULAGLUTIDE 1.5 MG/.5ML
1.5 INJECTION, SOLUTION SUBCUTANEOUS WEEKLY
Qty: 2 ML | Refills: 5 | Status: SHIPPED | OUTPATIENT
Start: 2023-03-06

## 2023-05-30 ENCOUNTER — TELEPHONE (OUTPATIENT)
Dept: ALLERGY | Facility: CLINIC | Age: 53
End: 2023-05-30

## 2023-05-30 NOTE — TELEPHONE ENCOUNTER
Patient has not presented for Xolair injection since 2022. Patient own supplied Xolair  and discarded in medication waste bin.      Xolair 150 mg/mL prefilled syringe x2  NDC#: 92052-584-96  Lot #: [id number]  Exp Date: 2023    Xolair 150 mg/mL prefilled syringe x2  NDC#: 94362-250-36  Exp Date: 2023  Lot #: [id number]

## 2023-06-07 ENCOUNTER — PATIENT MESSAGE (OUTPATIENT)
Dept: ENDOCRINOLOGY CLINIC | Facility: CLINIC | Age: 53
End: 2023-06-07

## 2023-06-08 NOTE — TELEPHONE ENCOUNTER
From: Frantz Perera  To: ANNAMARIE Coleman  Sent: 6/7/2023 11:31 AM CDT  Subject: Level at 220    Hi , i tested my level today because I have a slight headache and it was at 220 , Alina been living out of a hotel the past two weeks and not taking the best care of myself . .. can I come in for appt or should I start back on the medication . ..

## 2023-06-26 RX ORDER — DULAGLUTIDE 1.5 MG/.5ML
1.5 INJECTION, SOLUTION SUBCUTANEOUS WEEKLY
Qty: 6 ML | Refills: 0 | Status: SHIPPED | OUTPATIENT
Start: 2023-06-26

## 2023-06-26 NOTE — TELEPHONE ENCOUNTER
Trulicity 7.8MG/ZIPLAZ sent into [facility] off of [address] and [facility]

## 2023-07-31 ENCOUNTER — PATIENT MESSAGE (OUTPATIENT)
Dept: ENDOCRINOLOGY CLINIC | Facility: CLINIC | Age: 53
End: 2023-07-31

## 2023-08-21 ENCOUNTER — OFFICE VISIT (OUTPATIENT)
Dept: ENDOCRINOLOGY CLINIC | Facility: CLINIC | Age: 53
End: 2023-08-21

## 2023-08-21 VITALS
HEIGHT: 67 IN | BODY MASS INDEX: 31.08 KG/M2 | HEART RATE: 86 BPM | DIASTOLIC BLOOD PRESSURE: 89 MMHG | SYSTOLIC BLOOD PRESSURE: 134 MMHG | WEIGHT: 198 LBS

## 2023-08-21 DIAGNOSIS — E11.9 CONTROLLED TYPE 2 DIABETES MELLITUS WITHOUT COMPLICATION, WITHOUT LONG-TERM CURRENT USE OF INSULIN (HCC): Primary | ICD-10-CM

## 2023-08-21 LAB
CARTRIDGE LOT#: ABNORMAL NUMERIC
GLUCOSE BLOOD: 106
HEMOGLOBIN A1C: 6.2 % (ref 4.3–5.6)
TEST STRIP LOT #: NORMAL NUMERIC

## 2023-08-21 PROCEDURE — 3008F BODY MASS INDEX DOCD: CPT

## 2023-08-21 PROCEDURE — 99214 OFFICE O/P EST MOD 30 MIN: CPT

## 2023-08-21 PROCEDURE — 82947 ASSAY GLUCOSE BLOOD QUANT: CPT

## 2023-08-21 PROCEDURE — 3075F SYST BP GE 130 - 139MM HG: CPT

## 2023-08-21 PROCEDURE — 3079F DIAST BP 80-89 MM HG: CPT

## 2023-08-21 PROCEDURE — 83036 HEMOGLOBIN GLYCOSYLATED A1C: CPT

## 2023-08-21 PROCEDURE — 3044F HG A1C LEVEL LT 7.0%: CPT

## 2023-08-21 RX ORDER — SEMAGLUTIDE 0.68 MG/ML
0.5 INJECTION, SOLUTION SUBCUTANEOUS WEEKLY
Qty: 1 EACH | Refills: 5 | Status: SHIPPED | OUTPATIENT
Start: 2023-08-21

## 2023-08-21 NOTE — PROGRESS NOTES
Name: Sandra Macias  Date: 8/21/23    Referring Physician: No ref. provider found    HISTORY OF PRESENT ILLNESS   Sandra Macias is a 50year old female who presents for follow up  for diabetes mellitus. She was admitted to the hospital 5/10/2022 after presenting to the ED with polyuria, polydipsia and generalized weakness. She was found to have significant hyperglycemia. Of note she had been taking prednisone for chronic idiopathic urticaria, but is now off prednisone after transitioning to Xolair. Since last visit she had been out of medications for a few weeks 6/2023 due to moving and living in a hotel temporarily. Was under a lot of stress at that time and had also not been adherent with diet. Sugars increased significantly and she did contact our office. Was restarted on medications with significantly improved blood sugars. Diabetes History:  Diagnosed- 5/2022    FH DM  -grandfather possibly    Prior glycohemoglobin were 13.2% 5/2022; 9.5% 6/2022; 6.4% 8/2022; 5.8% 12/2022; 5.6% 3/2023; 6.2% POC today     Dietary compliance:      Recall:  Breakfast-yogurt with strawberries, raspberries and blueberries, juicing  Lunch-protein wraps with turkey, or sometimes just fruit at work during evening shift   Dinner-protein and some vegetables - more vegetables recently   Snack-nuts and sugar free jello , fruits   Beverages- water, ginger ale occasionally     Exercise: Yes- has been walking daily     Polyuria/polydipsia: No  Blurred vision: No     Episodes of hypoglycemia: None  Blood Glucose:    Checking 1-2 times per day- Reviewed logs:    103, 125, 130, 112, 87, 108, 110, 98, 102, 99, 120, 105, 87, 106, 129, 127, 152, 105. Previous DM medications  70/30 mixed insulin- stopped after weaned from oral steroids  Metformin- GI side effects and fatigue     Current DM Regimen:  Trulicity 4.4FE subcutaneous weekly - has had persistent acid reflux since restarting medication.      Modifying factors:  Medication adherence: yes   Recent steroids, illness or infections: No      REVIEW OF SYSTEMS  Eyes: Diabetic retinopathy present: None known             Most recent visit to eye doctor in last 12 months: Yes- around 12/2022    CV: Cardiovascular disease present: No         Hypertension present: Yes         Hyperlipidemia present: No         Peripheral Vascular Disease present: No    : Nephropathy present: No    Neuro: Neuropathy present: No    Skin: Infection or ulceration: No    Osteoporosis: No    Thyroid disease: No      Medications:     Current Outpatient Medications:     semaglutide (OZEMPIC, 0.25 OR 0.5 MG/DOSE,) 2 MG/3ML Subcutaneous Solution Pen-injector, Inject 0.5 mg into the skin once a week., Disp: 1 each, Rfl: 5    Glucose Blood (ONETOUCH VERIO) In Vitro Strip, To check sugars twice a day E 11.65 with insulin use, Disp: 200 strip, Rfl: 0    Omalizumab (XOLAIR) 150 MG/ML Subcutaneous Solution Prefilled Syringe, Inject 300 mg into the skin every 28 days. , Disp: 2 mL, Rfl: 2    hydroCHLOROthiazide 25 MG Oral Tab, Take 1 tablet (25 mg total) by mouth daily as needed. , Disp: 90 tablet, Rfl: 0    amLODIPine 5 MG Oral Tab, Take 1 tablet (5 mg total) by mouth daily. , Disp: 90 tablet, Rfl: 1    Blood Glucose Monitoring Suppl ([address]) w/Device Does not apply Kit, 1 each As Directed.  To check sugars twice a day E 11.65 with insulin use, Disp: 1 kit, Rfl: 0    OneTouch Delica Lancets 22G Does not apply Misc, To check sugars twice a day E 11.65 with insulin use, Disp: 200 each, Rfl: 0    Blood Glucose Monitoring Suppl Does not apply Kit, Use as directed to check blood glucose twice a day - fasting and before dinner, Disp: 1 kit, Rfl: 0    Blood Glucose Monitoring Suppl Does not apply Device, Use as directed to check blood glucose twice a day -fasting and before dinner, Disp: 200 each, Rfl: 0    Microlet Lancets Does not apply Misc, Use as directed to check blood glucose twice a day - fasting and before dinner, Disp: 200 each, Rfl: 0    Insulin Pen Needle (CAREFINE PEN NEEDLES) 32G X 5 MM Does not apply Misc, Use as directed to inject insulin twice a day, Disp: 200 each, Rfl: 0    fluocinonide 0.05 % External Ointment, Apply to involved areas twice a day (Patient not taking: Reported on 8/8/2022), Disp: 60 g, Rfl: 0    fluticasone propionate (FLONASE) 50 MCG/ACT Nasal Suspension, 2 sprays by Nasal route daily.  (Patient not taking: Reported on 8/8/2022), Disp: 3 each, Rfl: 0    EPINEPHrine (EPIPEN 2-YANY) 0.3 MG/0.3ML Injection Solution Auto-injector, Inject IM in event of  allergic reaction (Patient not taking: Reported on 8/8/2022), Disp: 1 each, Rfl: 0     Allergies:     Iodine (Topical)        ANAPHYLAXIS, HIVES    Comment:Eyes swell, welts on body  Shellfish               HIVES    Social History:   Social History    Socioeconomic History      Marital status:     Tobacco Use      Smoking status: Never      Smokeless tobacco: Never    Vaping Use      Vaping Use: Never used    Substance and Sexual Activity      Alcohol use: Yes        Comment: occasionally      Drug use: No      Sexual activity: Yes        Partners: Male        Birth control/protection: Implant        Comment: ESSURE    Other Topics      Concerns:        Caffeine Concern: Yes          coffee, 1/2 cup daily        Right Handed: Yes      Medical History:   Past Medical History:   Diagnosis Date    Allergy     allergies    Amenorrhea [id number]    Asthma     Decorative tattoo     Diabetes ([address]     High blood pressure     Unspecified disorder of neck     overactive gland in neck, surgical removal 1991       Surgical history:   Past Surgical History:   Procedure Laterality Date    HYSTEROSCOPY, STERILIZATION  2017    Essure    INCISIONAL BIOPSY SKIN SINGLE LESION Left 09/23/2020    left shoulder lipoma    NECK/CHEST PROCEDURE UNLISTED  1991    surgical removal (overactive gland in neck)    WRIST ARTHROSCOP,RELEASE Dorena Longest LIG Right 12/10/2021 Right endoscopic carpal tunnel release         PHYSICAL EXAM   08/21/23  1036   BP: (!) 148/93   Pulse: 106   Weight: 198 lb (89.8 kg)   Height: 5' 7\" (1.702 m)         General Appearance:  alert, well developed, in no acute distress  Eyes:  normal conjunctivae, sclera, and normal pupils  Neck: Trachea midline: Normal  Back: no kyphosis or back tenderness  Respiratory:  clear to auscultation bilaterally  Cardiovascular:  regular rate, rhythm, , no murmurs, S3 or S4  Lymph Nodes:  No abnormal nodes noted  Musculoskeletal:  normal muscle strength and tone  Skin:  normal moisture and skin texture  Hair & Nails:  normal scalp hair     Hematologic:  no excessive bruising  Neuro:  sensory grossly intact and motor grossly intact. Psychiatric:  oriented to time, self, and place  Nutritional:  no abnormal weight gain or loss      ASSESSMENT/PLAN:    Diabetes mellitus type 2 Controlled   - HgA1c - 6.2% - slight increase but likely due to being off medication for a few weeks 6/2023- sugars now have returned to target.   -Congratulated patient on improved glycemic control   - Reviewed ABC's of diabetes  - Reviewed pathogenesis of diabetes.   -Congratulated patient on improved glycemic control.   - Reviewed importance of good glycemic control to prevent microvascular and macrovascular complications including nephropathy, neuropathy, retinopathy, and cardiovascular disease.  - Reviewed importance of SBGM- check glucose 2 times daily   - Reviewed target glucose goals for patient 80-120mg/dl fasting and <180 mg/dl post prandially   - Reviewed importance of following diabetic diet- recommended 135 grams of CHO per day or 45 grams per meal.   - Provided patient education materials  -SBGM logs show glucose levels at goal at home.     -Having persistent GI side effects with Trulicity. Does prefer to continue with weekly GLP- 1 agonist so will try alternative to see if better tolerated. -Start Ozempic- 0.5mg subcutaneous weekly. Reviewed side effects and risks vs benefits of medication. Reviewed pen injection and dose titration during visit today. -BP elevated at visit but improved on repeat. Has been normal previously  -Lipids normal 7/2022- repeat labs before next visit   -normal urine protein 7/2022- repeat labs before next visit   -UTD with optho   -normal foot exam     RTC in 4 months   8/21/23  ANNAMARIE Boland      A total of 30 minutes was spent on obtaining history, reviewing pertinent imaging/labs and specialists notes, evaluating patient, providing multiple treatment options, reinforcing diet/exercise and compliance, and completing documentation.

## 2023-08-22 ENCOUNTER — LAB ENCOUNTER (OUTPATIENT)
Dept: LAB | Age: 53
End: 2023-08-22
Attending: FAMILY MEDICINE
Payer: COMMERCIAL

## 2023-08-22 ENCOUNTER — OFFICE VISIT (OUTPATIENT)
Dept: FAMILY MEDICINE CLINIC | Facility: CLINIC | Age: 53
End: 2023-08-22

## 2023-08-22 VITALS
SYSTOLIC BLOOD PRESSURE: 138 MMHG | OXYGEN SATURATION: 99 % | BODY MASS INDEX: 30.92 KG/M2 | HEART RATE: 110 BPM | WEIGHT: 197 LBS | DIASTOLIC BLOOD PRESSURE: 90 MMHG | TEMPERATURE: 98 F | HEIGHT: 67 IN

## 2023-08-22 DIAGNOSIS — E11.9 TYPE 2 DIABETES MELLITUS WITHOUT COMPLICATION, WITH LONG-TERM CURRENT USE OF INSULIN (HCC): ICD-10-CM

## 2023-08-22 DIAGNOSIS — E11.9 CONTROLLED TYPE 2 DIABETES MELLITUS WITHOUT COMPLICATION, WITHOUT LONG-TERM CURRENT USE OF INSULIN (HCC): ICD-10-CM

## 2023-08-22 DIAGNOSIS — I10 ESSENTIAL HYPERTENSION: ICD-10-CM

## 2023-08-22 DIAGNOSIS — Z00.00 ROUTINE GENERAL MEDICAL EXAMINATION AT A HEALTH CARE FACILITY: ICD-10-CM

## 2023-08-22 DIAGNOSIS — G47.9 SLEEP DISORDER: ICD-10-CM

## 2023-08-22 DIAGNOSIS — L50.9 HIVES: ICD-10-CM

## 2023-08-22 DIAGNOSIS — Z79.4 TYPE 2 DIABETES MELLITUS WITHOUT COMPLICATION, WITH LONG-TERM CURRENT USE OF INSULIN (HCC): ICD-10-CM

## 2023-08-22 DIAGNOSIS — Z00.00 ROUTINE GENERAL MEDICAL EXAMINATION AT A HEALTH CARE FACILITY: Primary | ICD-10-CM

## 2023-08-22 DIAGNOSIS — Z12.11 COLON CANCER SCREENING: ICD-10-CM

## 2023-08-22 PROBLEM — R73.9 HYPERGLYCEMIA: Status: RESOLVED | Noted: 2022-05-10 | Resolved: 2023-08-22

## 2023-08-22 PROBLEM — E66.09 NON MORBID OBESITY DUE TO EXCESS CALORIES: Status: RESOLVED | Noted: 2018-05-10 | Resolved: 2023-08-22

## 2023-08-22 LAB
BASOPHILS # BLD AUTO: 0.03 X10(3) UL (ref 0–0.2)
BASOPHILS NFR BLD AUTO: 0.5 %
CHOLEST SERPL-MCNC: 190 MG/DL (ref ?–200)
CREAT UR-SCNC: 124 MG/DL
DEPRECATED RDW RBC AUTO: 42.3 FL (ref 35.1–46.3)
EOSINOPHIL # BLD AUTO: 0.1 X10(3) UL (ref 0–0.7)
EOSINOPHIL NFR BLD AUTO: 1.7 %
ERYTHROCYTE [DISTWIDTH] IN BLOOD BY AUTOMATED COUNT: 12.4 % (ref 11–15)
FASTING PATIENT LIPID ANSWER: NO
HCT VFR BLD AUTO: 39.3 %
HDLC SERPL-MCNC: 60 MG/DL (ref 40–59)
HGB BLD-MCNC: 12.9 G/DL
IMM GRANULOCYTES # BLD AUTO: 0 X10(3) UL (ref 0–1)
IMM GRANULOCYTES NFR BLD: 0 %
LDLC SERPL CALC-MCNC: 111 MG/DL (ref ?–100)
LYMPHOCYTES # BLD AUTO: 1.29 X10(3) UL (ref 1–4)
LYMPHOCYTES NFR BLD AUTO: 22.6 %
MCH RBC QN AUTO: 30.6 PG (ref 26–34)
MCHC RBC AUTO-ENTMCNC: 32.8 G/DL (ref 31–37)
MCV RBC AUTO: 93.1 FL
MICROALBUMIN UR-MCNC: 2.1 MG/DL
MICROALBUMIN/CREAT 24H UR-RTO: 16.9 UG/MG (ref ?–30)
MONOCYTES # BLD AUTO: 0.44 X10(3) UL (ref 0.1–1)
MONOCYTES NFR BLD AUTO: 7.7 %
NEUTROPHILS # BLD AUTO: 3.86 X10 (3) UL (ref 1.5–7.7)
NEUTROPHILS # BLD AUTO: 3.86 X10(3) UL (ref 1.5–7.7)
NEUTROPHILS NFR BLD AUTO: 67.5 %
NONHDLC SERPL-MCNC: 130 MG/DL (ref ?–130)
PLATELET # BLD AUTO: 290 10(3)UL (ref 150–450)
RBC # BLD AUTO: 4.22 X10(6)UL
TRIGL SERPL-MCNC: 109 MG/DL (ref 30–149)
VLDLC SERPL CALC-MCNC: 19 MG/DL (ref 0–30)
WBC # BLD AUTO: 5.7 X10(3) UL (ref 4–11)

## 2023-08-22 PROCEDURE — 3008F BODY MASS INDEX DOCD: CPT | Performed by: FAMILY MEDICINE

## 2023-08-22 PROCEDURE — 99214 OFFICE O/P EST MOD 30 MIN: CPT | Performed by: FAMILY MEDICINE

## 2023-08-22 PROCEDURE — 36415 COLL VENOUS BLD VENIPUNCTURE: CPT

## 2023-08-22 PROCEDURE — 3061F NEG MICROALBUMINURIA REV: CPT | Performed by: FAMILY MEDICINE

## 2023-08-22 PROCEDURE — 82570 ASSAY OF URINE CREATININE: CPT

## 2023-08-22 PROCEDURE — 82043 UR ALBUMIN QUANTITATIVE: CPT

## 2023-08-22 PROCEDURE — 80061 LIPID PANEL: CPT

## 2023-08-22 PROCEDURE — 99396 PREV VISIT EST AGE 40-64: CPT | Performed by: FAMILY MEDICINE

## 2023-08-22 PROCEDURE — 3080F DIAST BP >= 90 MM HG: CPT | Performed by: FAMILY MEDICINE

## 2023-08-22 PROCEDURE — 85025 COMPLETE CBC W/AUTO DIFF WBC: CPT

## 2023-08-22 PROCEDURE — 3075F SYST BP GE 130 - 139MM HG: CPT | Performed by: FAMILY MEDICINE

## 2023-08-22 RX ORDER — ZOLPIDEM TARTRATE 10 MG/1
10 TABLET ORAL NIGHTLY PRN
Qty: 30 TABLET | Refills: 0 | Status: SHIPPED | OUTPATIENT
Start: 2023-08-22

## 2023-08-22 RX ORDER — VALSARTAN 80 MG/1
80 TABLET ORAL DAILY
Qty: 30 TABLET | Refills: 1 | Status: SHIPPED | OUTPATIENT
Start: 2023-08-22

## 2023-08-23 RX ORDER — VALSARTAN 80 MG/1
80 TABLET ORAL DAILY
Qty: 90 TABLET | Refills: 0 | OUTPATIENT
Start: 2023-08-23

## 2023-08-24 RX ORDER — ROSUVASTATIN CALCIUM 5 MG/1
5 TABLET, COATED ORAL NIGHTLY
Qty: 30 TABLET | Refills: 3 | Status: SHIPPED | OUTPATIENT
Start: 2023-08-24

## 2023-08-31 ENCOUNTER — OFFICE VISIT (OUTPATIENT)
Dept: ALLERGY | Facility: CLINIC | Age: 53
End: 2023-08-31

## 2023-08-31 VITALS — SYSTOLIC BLOOD PRESSURE: 135 MMHG | HEART RATE: 99 BPM | OXYGEN SATURATION: 98 % | DIASTOLIC BLOOD PRESSURE: 91 MMHG

## 2023-08-31 DIAGNOSIS — J30.89 PERENNIAL ALLERGIC RHINITIS WITH SEASONAL VARIATION: ICD-10-CM

## 2023-08-31 DIAGNOSIS — Z23 FLU VACCINE NEED: ICD-10-CM

## 2023-08-31 DIAGNOSIS — J30.2 PERENNIAL ALLERGIC RHINITIS WITH SEASONAL VARIATION: ICD-10-CM

## 2023-08-31 DIAGNOSIS — L50.1 CHRONIC IDIOPATHIC URTICARIA: ICD-10-CM

## 2023-08-31 DIAGNOSIS — J45.20 MILD INTERMITTENT EXTRINSIC ASTHMA WITHOUT COMPLICATION: Primary | ICD-10-CM

## 2023-08-31 DIAGNOSIS — Z92.29 COVID-19 VACCINE SERIES COMPLETED: ICD-10-CM

## 2023-08-31 DIAGNOSIS — Z91.018 FOOD ALLERGY: ICD-10-CM

## 2023-08-31 PROCEDURE — 99214 OFFICE O/P EST MOD 30 MIN: CPT | Performed by: ALLERGY & IMMUNOLOGY

## 2023-08-31 PROCEDURE — 3080F DIAST BP >= 90 MM HG: CPT | Performed by: ALLERGY & IMMUNOLOGY

## 2023-08-31 PROCEDURE — 3075F SYST BP GE 130 - 139MM HG: CPT | Performed by: ALLERGY & IMMUNOLOGY

## 2023-08-31 RX ORDER — MONTELUKAST SODIUM 10 MG/1
10 TABLET ORAL NIGHTLY
Qty: 30 TABLET | Refills: 0 | Status: SHIPPED | OUTPATIENT
Start: 2023-08-31 | End: 2023-08-31

## 2023-08-31 RX ORDER — MONTELUKAST SODIUM 10 MG/1
10 TABLET ORAL NIGHTLY
Qty: 90 TABLET | Refills: 0 | OUTPATIENT
Start: 2023-08-31

## 2023-08-31 RX ORDER — ALBUTEROL SULFATE 90 UG/1
2 AEROSOL, METERED RESPIRATORY (INHALATION) EVERY 6 HOURS PRN
Qty: 1 EACH | Refills: 1 | Status: SHIPPED | OUTPATIENT
Start: 2023-08-31

## 2023-08-31 RX ORDER — MONTELUKAST SODIUM 10 MG/1
10 TABLET ORAL NIGHTLY
Qty: 90 TABLET | Refills: 0 | Status: SHIPPED | OUTPATIENT
Start: 2023-08-31

## 2023-08-31 RX ORDER — EPINEPHRINE 0.3 MG/.3ML
INJECTION SUBCUTANEOUS
Qty: 1 EACH | Refills: 0 | Status: SHIPPED | OUTPATIENT
Start: 2023-08-31

## 2023-08-31 RX ORDER — FEXOFENADINE HCL 180 MG/1
180 TABLET ORAL DAILY
COMMUNITY

## 2023-09-18 ENCOUNTER — OFFICE VISIT (OUTPATIENT)
Dept: FAMILY MEDICINE CLINIC | Facility: CLINIC | Age: 53
End: 2023-09-18

## 2023-09-18 VITALS
SYSTOLIC BLOOD PRESSURE: 140 MMHG | TEMPERATURE: 98 F | DIASTOLIC BLOOD PRESSURE: 84 MMHG | BODY MASS INDEX: 30.76 KG/M2 | HEART RATE: 89 BPM | OXYGEN SATURATION: 99 % | HEIGHT: 67 IN | WEIGHT: 196 LBS

## 2023-09-18 DIAGNOSIS — I10 ESSENTIAL HYPERTENSION: Primary | ICD-10-CM

## 2023-09-18 PROCEDURE — 3008F BODY MASS INDEX DOCD: CPT | Performed by: FAMILY MEDICINE

## 2023-09-18 PROCEDURE — 99214 OFFICE O/P EST MOD 30 MIN: CPT | Performed by: FAMILY MEDICINE

## 2023-09-18 PROCEDURE — 3077F SYST BP >= 140 MM HG: CPT | Performed by: FAMILY MEDICINE

## 2023-09-18 PROCEDURE — 3079F DIAST BP 80-89 MM HG: CPT | Performed by: FAMILY MEDICINE

## 2023-09-18 RX ORDER — ZOLPIDEM TARTRATE 10 MG/1
10 TABLET ORAL NIGHTLY PRN
Qty: 30 TABLET | Refills: 5 | Status: SHIPPED | OUTPATIENT
Start: 2023-09-18

## 2023-09-18 RX ORDER — VALSARTAN 80 MG/1
80 TABLET ORAL DAILY
Qty: 30 TABLET | Refills: 5 | Status: SHIPPED | OUTPATIENT
Start: 2023-09-18

## 2023-09-21 ENCOUNTER — PATIENT MESSAGE (OUTPATIENT)
Dept: FAMILY MEDICINE CLINIC | Facility: CLINIC | Age: 53
End: 2023-09-21

## 2023-09-21 NOTE — TELEPHONE ENCOUNTER
From: Rojas Haile  To: Janeice Olszewski  Sent: 9/21/2023 7:55 AM CDT  Subject: BP Medicine     I took the 2 doses and did not have any adverse reactions, the refill is still at the [facility] if you want to change to the higher dosage . Thank you.

## 2023-09-22 RX ORDER — VALSARTAN 160 MG/1
160 TABLET ORAL DAILY
Qty: 90 TABLET | Refills: 1 | Status: SHIPPED | OUTPATIENT
Start: 2023-09-22

## 2023-10-25 ENCOUNTER — TELEPHONE (OUTPATIENT)
Dept: FAMILY MEDICINE CLINIC | Facility: CLINIC | Age: 53
End: 2023-10-25

## 2023-10-25 NOTE — TELEPHONE ENCOUNTER
Due for   Colonoscopy  Astham action plan   Asthma control plan   Mammogram   DM foot exam   DM eye exam  HTN follow up   Flu shingles tdap covid penumo     Completed   PX  8/22/2024  Pap 9/30/2024  DM A1c 2/21/2024      See TE 10/25/2023 mychart letter mailed

## 2023-11-03 ENCOUNTER — LAB ENCOUNTER (OUTPATIENT)
Dept: LAB | Facility: HOSPITAL | Age: 53
End: 2023-11-03
Attending: OBSTETRICS & GYNECOLOGY
Payer: COMMERCIAL

## 2023-11-03 ENCOUNTER — OFFICE VISIT (OUTPATIENT)
Dept: OBGYN CLINIC | Facility: CLINIC | Age: 53
End: 2023-11-03
Payer: COMMERCIAL

## 2023-11-03 VITALS
HEART RATE: 93 BPM | SYSTOLIC BLOOD PRESSURE: 109 MMHG | BODY MASS INDEX: 29 KG/M2 | DIASTOLIC BLOOD PRESSURE: 77 MMHG | WEIGHT: 186.63 LBS

## 2023-11-03 DIAGNOSIS — Z12.4 SCREENING FOR CERVICAL CANCER: ICD-10-CM

## 2023-11-03 DIAGNOSIS — Z11.3 SCREEN FOR STD (SEXUALLY TRANSMITTED DISEASE): ICD-10-CM

## 2023-11-03 DIAGNOSIS — Z12.31 VISIT FOR SCREENING MAMMOGRAM: ICD-10-CM

## 2023-11-03 DIAGNOSIS — Z01.419 ENCOUNTER FOR GYNECOLOGICAL EXAMINATION WITHOUT ABNORMAL FINDING: Primary | ICD-10-CM

## 2023-11-03 PROCEDURE — 87389 HIV-1 AG W/HIV-1&-2 AB AG IA: CPT

## 2023-11-03 PROCEDURE — 3074F SYST BP LT 130 MM HG: CPT | Performed by: OBSTETRICS & GYNECOLOGY

## 2023-11-03 PROCEDURE — 86803 HEPATITIS C AB TEST: CPT

## 2023-11-03 PROCEDURE — 99396 PREV VISIT EST AGE 40-64: CPT | Performed by: OBSTETRICS & GYNECOLOGY

## 2023-11-03 PROCEDURE — 87340 HEPATITIS B SURFACE AG IA: CPT

## 2023-11-03 PROCEDURE — 86780 TREPONEMA PALLIDUM: CPT

## 2023-11-03 PROCEDURE — 36415 COLL VENOUS BLD VENIPUNCTURE: CPT

## 2023-11-03 PROCEDURE — 3078F DIAST BP <80 MM HG: CPT | Performed by: OBSTETRICS & GYNECOLOGY

## 2023-11-03 NOTE — PROGRESS NOTES
August Cano is a [address] old female  No LMP recorded. (Menstrual status: Other). Patient presents with:  Gyn Exam: ANNUAL EXAM -- placed on long term steriods for Rx of hives & caused DM -- hospitalized x 2 days  Std Screen: Wishes for full screen  .     OBSTETRICS HISTORY:     OB History    Para Term  AB Living   2 1 1 0 1 1   SAB IAB Ectopic Multiple Live Births   1 0 0 0 1      # Outcome Date GA Lbr Alejandro/2nd Weight Sex Delivery Anes PTL Lv   2 SAB            1 Term     M NORMAL SPONT   BETO       GYNE HISTORY:     Periods none due to menopause      Essure    Sexual activity:   Yes      Partners:   Male      Birth control/ protection:   Implant      Comment:   ESSURE           Menarche: 17years old   Period Cycle (Days): regular   Period Duration (Days): 7 days   Period Flow: moderate   Use of Birth Control (if yes, specify type): Essure/OCP  Hx Prior Abnormal Pap: No  Pap Date: 19  Pap Result Notes: negative, HPV negative   Follow Up Recommendation: Bilateral mammogram 3-31-22  Benign          Latest Ref Rng & Units 2019     2:01 PM   RECENT PAP RESULTS   Thinprep Pap Negative for intraepithelial lesion or malignancy Negative for intraepithelial lesion or malignancy    HPV Negative Negative          MEDICAL HISTORY:     Past Medical History:   Diagnosis Date    Allergy     allergies    Amenorrhea [id number]    Asthma     Decorative tattoo     Diabetes ([facility] [address]     High blood pressure     Unspecified disorder of neck     overactive gland in neck, surgical removal      Past Surgical History:   Procedure Laterality Date    HYSTEROSCOPY, STERILIZATION  2017    Essure    INCISIONAL BIOPSY SKIN SINGLE LESION Left 2020    left shoulder lipoma    NECK/CHEST PROCEDURE UNLISTED      surgical removal (overactive gland in neck)    WRIST ARTHROSCOP,RELEASE XVERS LIG Right 12/10/2021    Right endoscopic carpal tunnel release     OB History     T1    L1    SAB1  IAB0 Ectopic0  Multiple0  Live Births1      SOCIAL HISTORY:     Tobacco Use: Low Risk  (11/3/2023)      Patient History          Smoking Tobacco Use: Never          Smokeless Tobacco Use: Never          Passive Exposure: Not on file    FAMILY HISTORY:     Family History   Problem Relation Age of Onset    Diabetes Mother     Cancer Mother         lung CA    Heart Disease Father     Allergies Son     No Known Problems Sister     No Known Problems Brother     No Known Problems Brother     No Known Problems Brother          MEDICATIONS:       Current Outpatient Medications:     Tirzepatide (MOUNJARO) 5 MG/0.5ML Subcutaneous Solution Pen-injector, Inject 5 mg into the skin once a week., Disp: 2 mL, Rfl: 0    valsartan 160 MG Oral Tab, Take 1 tablet (160 mg total) by mouth daily. , Disp: 90 tablet, Rfl: 1    zolpidem (AMBIEN) 10 MG Oral Tab, Take 1 tablet (10 mg total) by mouth nightly as needed for Sleep., Disp: 30 tablet, Rfl: 5    albuterol (PROAIR HFA) 108 (90 Base) MCG/ACT Inhalation Aero Soln, Inhale 2 puffs into the lungs every 6 (six) hours as needed for Wheezing., Disp: 1 each, Rfl: 1    EPINEPHrine (EPIPEN 2-YANY) 0.3 MG/0.3ML Injection Solution Auto-injector, Inject IM in event of  allergic reaction, Disp: 1 each, Rfl: 0    montelukast (SINGULAIR) 10 MG Oral Tab, Take 1 tablet (10 mg total) by mouth nightly., Disp: 90 tablet, Rfl: 0    Glucose Blood (ONETOUCH VERIO) In Vitro Strip, To check sugars twice a day E 11.65 with insulin use, Disp: 200 strip, Rfl: 0    Omalizumab (XOLAIR) 150 MG/ML Subcutaneous Solution Prefilled Syringe, Inject 300 mg into the skin every 28 days. , Disp: 2 mL, Rfl: 2    Blood Glucose Monitoring Suppl ([address]) w/Device Does not apply Kit, 1 each As Directed.  To check sugars twice a day E 11.65 with insulin use, Disp: 1 kit, Rfl: 0    Blood Glucose Monitoring Suppl Does not apply Kit, Use as directed to check blood glucose twice a day - fasting and before dinner, Disp: 1 kit, Rfl: 0    Blood Glucose Monitoring Suppl Does not apply Device, Use as directed to check blood glucose twice a day -fasting and before dinner, Disp: 200 each, Rfl: 0    Microlet Lancets Does not apply Misc, Use as directed to check blood glucose twice a day - fasting and before dinner, Disp: 200 each, Rfl: 0    fluticasone propionate (FLONASE) 50 MCG/ACT Nasal Suspension, 2 sprays by Nasal route daily. , Disp: 3 each, Rfl: 0    fexofenadine 180 MG Oral Tab, Take 1 tablet (180 mg total) by mouth daily. (Patient not taking: Reported on 11/3/2023), Disp: , Rfl:     rosuvastatin 5 MG Oral Tab, Take 1 tablet (5 mg total) by mouth nightly. (Patient not taking: Reported on 11/3/2023), Disp: 30 tablet, Rfl: 3    OneTouch Delica Lancets 16Y Does not apply Misc, To check sugars twice a day E 11.65 with insulin use, Disp: 200 each, Rfl: 0    fluocinonide 0.05 % External Ointment, Apply to involved areas twice a day (Patient not taking: Reported on 11/3/2023), Disp: 60 g, Rfl: 0    ALLERGIES:       Iodine (Topical)        ANAPHYLAXIS, HIVES, UNKNOWN    Comment:Eyes swell, welts on body  Shellfish               HIVES      REVIEW OF SYSTEMS:     Constitutional:    denies fever / chills  Eyes:     denies blurred or double vision  Cardiovascular:  denies chest pain or palpitations  Respiratory:    denies shortness of breath  Gastrointestinal:  denies severe abdominal pain, frequent diarrhea or constipation, nausea / vomiting  Genitourinary:    denies dysuria, bothersome incontinence  Skin/Breast:   denies any breast pain, lumps, or discharge  Neurological:    denies frequent severe headaches  Psychiatric:   denies depression or anxiety, thoughts of harming self or others  Heme/Lymph:    denies easy bruising or bleeding      PHYSICAL EXAM:   Blood pressure 109/77, pulse 93, weight 186 lb 9.6 oz (84.6 kg), not currently breastfeeding.   Constitutional:  well developed, well nourished  Head/Face: normocephalic  Neck/Thyroid: thyroid symmetric, no thyromegaly, no nodules, no adenopathy  Lymphatic: no abnormal supraclavicular or axillary adenopathy is noted  Breast:   normal without palpable masses, tenderness, asymmetry, nipple discharge, nipple retraction or skin changes  Abdomen:   soft, nontender, nondistended, no masses  Skin/Hair:  no unusual rashes or bruises  Extremities:  no edema, no cyanosis  Psychiatric:   oriented to time, place, person and situation. Appropriate mood and affect    Pelvic Exam:  External Genitalia:  normal appearance, hair distribution, and no lesions  Urethral Meatus:   normal in size, location, without lesions and prolapse  Bladder:    no fullness, masses or tenderness  Vagina:    normal appearance without lesions, no abnormal discharge  Cervix:    normal without tenderness on motion  Uterus:    normal in size, contour, position, mobility, without tenderness  Adnexa:   normal without masses or tenderness  Perineum:   normal  Anus: no hemorroids         ASSESSMENT & PLAN:     Will Melo was seen today for gyn exam and std screen. Diagnoses and all orders for this visit:    Encounter for gynecological examination without abnormal finding  -     Chlamydia/Gc Amplification; Future  -     Trichomonas vaginalis, CLARY (Vaginal/Cervical); Future  -     Trichomonas vaginalis, CLARY (Vaginal/Cervical)  -     Chlamydia/Gc Amplification    Screen for STD (sexually transmitted disease)  -     HCV Antibody; Future  -     Hepatitis B Surface Antigen; Future  -     HIV Ag/Ab Combo; Future  -     T Pallidum Screening Cascade; Future  -     Chlamydia/Gc Amplification; Future  -     Trichomonas vaginalis, CLARY (Vaginal/Cervical); Future  -     Trichomonas vaginalis, CLARY (Vaginal/Cervical)  -     Chlamydia/Gc Amplification    Screening for cervical cancer  -     Chlamydia/Gc Amplification; Future  -     Trichomonas vaginalis, CLARY (Vaginal/Cervical);  Future  -     ThinPrep PAP Smear; Future  -     Hpv Dna  High Risk , Thin Prep Collect; Future  -     Hpv Dna  High Risk , Thin Prep Collect  -     Trichomonas vaginalis, CLARY (Vaginal/Cervical)  -     Chlamydia/Gc Amplification  -     ThinPrep PAP Smear    Visit for screening mammogram  -     Mammogram Screen 3D Digital Bilateral; Future    Other orders  -     Discontinue: nystatin-triamcinolone 100,000-0.1 Units/g-% External Cream; Apply 1 Application topically 2 (two) times daily. SUMMARY:  Pap: Next cotest today per ASCCP guidelines. BCM:  Essure  STD screening: GC/Chl/Trich/HepB/HepC/HIV/RPR, condoms encouraged  Mammogram: ordered placed   updated  Depression screen:   Depression Screening (PHQ-2/PHQ-9): Over the LAST 2 WEEKS   Little interest or pleasure in doing things (over the last two weeks)?: Not at all    Feeling down, depressed, or hopeless (over the last two weeks)?: Not at all    PHQ-2 SCORE: 0          FOLLOW-UP     Return in about 1 year (around 11/3/2024) for annual gyne exam.    Note to patient and family:  The [id number] makes medical notes available to patients in the interest of transparency. However, please be advised that this is a medical document. It is intended as a peer to peer communication. It is written in medical language and may contain abbreviations or verbiage that are technical and unfamiliar. It may appear blunt or direct. Medical documents are intended to carry relevant information, facts as evident, and the clinical opinion of the practitioner.

## 2023-11-06 LAB
C TRACH DNA SPEC QL NAA+PROBE: NEGATIVE
HPV I/H RISK 1 DNA SPEC QL NAA+PROBE: NEGATIVE
N GONORRHOEA DNA SPEC QL NAA+PROBE: NEGATIVE
T PALLIDUM AB SER QL: NEGATIVE
T VAGINALIS RRNA SPEC QL NAA+PROBE: NEGATIVE

## 2023-11-27 ENCOUNTER — HOSPITAL ENCOUNTER (OUTPATIENT)
Dept: MAMMOGRAPHY | Facility: HOSPITAL | Age: 53
Discharge: HOME | End: 2023-11-27
Attending: OBSTETRICS & GYNECOLOGY
Payer: COMMERCIAL

## 2023-11-27 DIAGNOSIS — Z12.31 VISIT FOR SCREENING MAMMOGRAM: ICD-10-CM

## 2023-11-27 PROCEDURE — 77067 SCR MAMMO BI INCL CAD: CPT | Performed by: OBSTETRICS & GYNECOLOGY

## 2023-11-27 PROCEDURE — 77063 BREAST TOMOSYNTHESIS BI: CPT | Performed by: OBSTETRICS & GYNECOLOGY

## 2023-12-04 ENCOUNTER — HOSPITAL ENCOUNTER (OUTPATIENT)
Dept: MAMMOGRAPHY | Facility: HOSPITAL | Age: 53
Discharge: HOME | End: 2023-12-04
Attending: OBSTETRICS & GYNECOLOGY
Payer: COMMERCIAL

## 2023-12-04 ENCOUNTER — HOSPITAL ENCOUNTER (OUTPATIENT)
Dept: ULTRASOUND IMAGING | Facility: HOSPITAL | Age: 53
Discharge: HOME | End: 2023-12-04
Attending: OBSTETRICS & GYNECOLOGY
Payer: COMMERCIAL

## 2023-12-04 DIAGNOSIS — R92.8 ABNORMAL MAMMOGRAM: ICD-10-CM

## 2023-12-04 DIAGNOSIS — R92.8 ABNORMAL MAMMOGRAM: Primary | ICD-10-CM

## 2023-12-04 PROCEDURE — 77066 DX MAMMO INCL CAD BI: CPT | Performed by: OBSTETRICS & GYNECOLOGY

## 2023-12-04 PROCEDURE — 77062 BREAST TOMOSYNTHESIS BI: CPT | Performed by: OBSTETRICS & GYNECOLOGY

## 2023-12-04 PROCEDURE — 76642 ULTRASOUND BREAST LIMITED: CPT | Performed by: OBSTETRICS & GYNECOLOGY

## 2023-12-06 NOTE — DISCHARGE INSTRUCTIONS
The Doctor (Radiologist) who performed your procedure was: DR Kenneth Lizama an ice pack over the biopsy site on top of your bra or on top of the ACE wrap (never apply ice directly over skin) for 10-15 minutes of every hour until bedtime for your comfort and to decrease bleeding. Keep your sports bra or the ACE wrap (stereotactic and MRI biopsy) in place for 24 hours after your biopsy. This compression decreases bleeding and breast movement for your comfort. Wear a supportive bra for the next couple of days for comfort (sports bra for sleep). Continue to wear, preferably, a sports bra or good supportive bra for 1 week and take off only to shower. No baths or showers during the first 24 hours after biopsy. After this time you may take a shower. It's okay if the strips get wet but do not soak them. NO saunas, hot tubs or swimming until steri-strips fall off (approx. 5 days). This prevents infection and allows time for them to completely close and heal.  DO NOT remove the steri-strips. They will fall off in 5 days. If any type of irritation (redness, itching or blisters) develops in the area around the steri-strips, remove them gently. If the steri-strips do not fall off after 5 days, gently remove them. Keep the area clean and dry. It is normal to have mild discomfort and bruising at the biopsy site. You may take Tylenol as needed for discomfort, as long as you have no allergies to Tylenol. Do not take aspirin, motrin, ibuprofen or any medication containing NSAID (non-steroidal anti-inflammatory drug) product for 48 hours. DO NOT participate in strenuous activity (aerobics, heavy lifting, housework, gardening, etc.) 48 hours after your biopsy to prevent bleeding. You will receive results in 2-3 business days. If you are having an MRI breast biopsy or an Ultrasound guided breast biopsy, you will be billed for the biopsy and unilateral mammogram separately.   If you have any questions about the procedure or your results, please contact the Breast Care Coordinator Nurse at [phone]. Notify your ordering physician or primary physician for increased bleeding, pain or fever over 100. Or contact a Radiology Nurse at [phone] between 8am-4pm (after 4pm, your call will be directed to the [facility]).

## 2023-12-07 DIAGNOSIS — R92.8 BI-RADS CATEGORY 3 MAMMOGRAM RESULT: Primary | ICD-10-CM

## 2023-12-07 NOTE — PROGRESS NOTES
Please call pt and inform her of results attached & order any follow up test recommended. Has biopsy already scheduled.  Needs f/u mammogram order

## 2023-12-08 ENCOUNTER — HOSPITAL ENCOUNTER (OUTPATIENT)
Dept: MAMMOGRAPHY | Facility: HOSPITAL | Age: 53
Discharge: HOME | End: 2023-12-08
Attending: OBSTETRICS & GYNECOLOGY
Payer: COMMERCIAL

## 2023-12-08 DIAGNOSIS — R92.8 ABNORMAL MAMMOGRAM: ICD-10-CM

## 2023-12-08 PROCEDURE — 19081 BX BREAST 1ST LESION STRTCTC: CPT | Performed by: OBSTETRICS & GYNECOLOGY

## 2023-12-08 PROCEDURE — 88305 TISSUE EXAM BY PATHOLOGIST: CPT | Performed by: OBSTETRICS & GYNECOLOGY

## 2023-12-11 NOTE — PROCEDURES
[facility] HOSP - [facility]  Procedure Note    Pretty Jimenez Patient Status:  Outpatient    1970 MRN [id number]   Location [address] Attending No att. providers found   Hosp Day # 0 PCP Scott Mari DO     Procedure: left breast biopsy    Pre-Procedure Diagnosis:  focal asymmetry    Post-Procedure Diagnosis: focal asymmetry    Anesthesia:  Local    Findings:  as above    Specimens: mult cores    Blood Loss:  0    Tourniquet Time: 0  Complications:  None  Drains:  0    Secondary Diagnosis:  none    Augustin Inman MD  12/10/2023

## 2023-12-12 ENCOUNTER — TELEPHONE (OUTPATIENT)
Dept: OBGYN CLINIC | Facility: CLINIC | Age: 53
End: 2023-12-12

## 2023-12-12 ENCOUNTER — TELEPHONE (OUTPATIENT)
Dept: MAMMOGRAPHY | Facility: HOSPITAL | Age: 53
End: 2023-12-12

## 2023-12-12 DIAGNOSIS — R92.8 ABNORMAL MAMMOGRAM: Primary | ICD-10-CM

## 2023-12-12 NOTE — TELEPHONE ENCOUNTER
Panfilo Grey is s/p biopsy . Phoned and introduced myself as breast coordinator . Reinforced to patient  post biopsy care and instructions . C/o little soreness took tylenol with some relief. She was informed a Bx is being recommended under us   questions answered . She  was informed that Dr Idell Bernheim  radiologist does not feel enough tissue to provide explanation of her asymmetry and would like to Bx area that correlates in us. Orders Requested . She verbalizes would like to wait until next week due to soreness. She was provided Wednesday 12/20/23 checking in at 615 am instructed to eat breakfast and wear sports bra to procedure. Informed  and shared the pathology results as well as the recommendations from Dr Idell Bernheim for her breast imaging  as follows:      Pathology results shared (see epic for dictated pathology and radiology procedure report)  and recommendations are as follows:       RECOMMENDATION:    Benign pathology result on sampling of a focal asymmetry in the lower inner left breast, which may be concordant the imaging assessment, although the area is felt to be undersampled, given predominantly fibroadipose tissue on pathology. A correlative   area was seen in the left breast at 8 o'clock 5 centimeters from the nipple. A single site left breast ultrasound-guided biopsy at 8 o'clock 5 centimeters from the nipple would be recommended. Bilateral mammogram has been recommended for probably benign bilateral calcifications, due in June 2024. Alonso Machado the above and denies questions. Panfilo Grey was also instructed to perform breast self exams and if any changes  develops any changes to contact ordering  physician immediately  for re evaluation. .  Nella Castanedaial understanding and agreement.

## 2023-12-12 NOTE — TELEPHONE ENCOUNTER
Received call from Sherene Kayser stating Left breast ultrasound guided biopsy order is needed.     Order placed

## 2023-12-13 NOTE — DISCHARGE INSTRUCTIONS
The Doctor (Radiologist) who performed your procedure was: Rhiannon Campbell Dr an ice pack over the biopsy site on top of your bra or on top of the ACE wrap (never apply ice directly over skin) for 10-15 minutes of every hour until bedtime for your comfort and to decrease bleeding. Keep your sports bra or the ACE wrap (stereotactic and MRI biopsy) in place for 24 hours after your biopsy. This compression decreases bleeding and breast movement for your comfort. Wear a supportive bra for the next couple of days for comfort (sports bra for sleep). Continue to wear, preferably, a sports bra or good supportive bra for 1 week and take off only to shower. No baths or showers during the first 24 hours after biopsy. After this time you may take a shower. It's okay if the strips get wet but do not soak them. NO saunas, hot tubs or swimming until steri-strips fall off (approx. 5 days). This prevents infection and allows time for them to completely close and heal.  DO NOT remove the steri-strips. They will fall off in 5 days. If any type of irritation (redness, itching or blisters) develops in the area around the steri-strips, remove them gently. If the steri-strips do not fall off after 5 days, gently remove them. Keep the area clean and dry. It is normal to have mild discomfort and bruising at the biopsy site. You may take Tylenol as needed for discomfort, as long as you have no allergies to Tylenol. Do not take aspirin, motrin, ibuprofen or any medication containing NSAID (non-steroidal anti-inflammatory drug) product for 48 hours. DO NOT participate in strenuous activity (aerobics, heavy lifting, housework, gardening, etc.) 48 hours after your biopsy to prevent bleeding. You will receive results in 2-3 business days. If you are having an MRI breast biopsy or an Ultrasound guided breast biopsy, you will be billed for the biopsy and unilateral mammogram separately.   If you have any questions about the procedure or your results, please contact the Breast Care Coordinator Nurse at [phone]. Notify your ordering physician or primary physician for increased bleeding, pain or fever over 100. Or contact a Radiology Nurse at [phone] between 8am-4pm (after 4pm, your call will be directed to the [facility] Emergency Room).

## 2023-12-14 NOTE — PROGRESS NOTES
Pt notified of benign results and recommendations for repeat mammogram in 6 months, June 2024. Pt has also scheduled additional biopsy under ultrasound per Radiology recs. Scheduled for 12/20/23. Await results and recs.

## 2023-12-14 NOTE — PROGRESS NOTES
Please call pt and inform her of results attached & order any follow up test recommended -- bx benign  Bilateral mammogram has been recommended for probably benign bilateral calcifications, due in June 2024.

## 2023-12-18 ENCOUNTER — TELEPHONE (OUTPATIENT)
Dept: FAMILY MEDICINE CLINIC | Facility: CLINIC | Age: 53
End: 2023-12-18

## 2023-12-18 NOTE — TELEPHONE ENCOUNTER
Left detailed vm for patient to call back and schedule DM eye exam, also provided information on how to fax results if done at another facility. A [employer] message was also sent with this information.

## 2023-12-19 NOTE — IMAGING NOTE
Pt arrived to room #4.  scans taken by BRITTANY MELENDEZ ultrasound technologist    Hx taken and is as follows: RECOMMENDATION:   Benign pathology result on sampling of a focal asymmetry in the lower inner left breast, which may be concordant the imaging assessment, although the area is felt to be undersampled, given predominantly fibroadipose tissue on pathology. A correlative area was seen in the left breast at 8 o'clock 5 centimeters from the nipple. A single site left breast ultrasound-guided biopsy at 8 o'clock 5 centimeters from the nipple would be recommended. 6430 Consent verified and obtained     0650 Post instructions given verbal et written. Avs summary sheet provided to patient. Patient verbalizes understanding and agreement . 3907 Imaging Completed by Dr Rodríguez Section Time out taken     0719 Skin prep with chloro prep sterile towels to site. Site marked LEFT    0722 Lidocaine  1% 10 milligrams per ml given from kit  total amount  3 ml given.    2320 Lidocaine 1% with epinephrine  1:100,000 units 200 milligrams per  20 ml given total amount 5 ml given. 3 [facility] biopsy device to be used for core samples     CORES taken at 0725  Total amount cores taken 6 placed in formalin at 0729    0730  VISION Clip placed      0730 Procedure completed. Pressure to site . No active bleeding noted. Area cleaned steri strips to site. Ice pack to site     0740 Specimen taken to pathology by BRITTANY MELENDEZ    5410 To mammography department  for post clip images.

## 2023-12-20 ENCOUNTER — HOSPITAL ENCOUNTER (OUTPATIENT)
Dept: MAMMOGRAPHY | Facility: HOSPITAL | Age: 53
Discharge: HOME | End: 2023-12-20
Attending: OBSTETRICS & GYNECOLOGY
Payer: COMMERCIAL

## 2023-12-20 ENCOUNTER — HOSPITAL ENCOUNTER (OUTPATIENT)
Dept: ULTRASOUND IMAGING | Facility: HOSPITAL | Age: 53
Discharge: HOME | End: 2023-12-20
Attending: OBSTETRICS & GYNECOLOGY
Payer: COMMERCIAL

## 2023-12-20 DIAGNOSIS — R92.8 ABNORMAL MAMMOGRAM: ICD-10-CM

## 2023-12-20 PROCEDURE — 19083 BX BREAST 1ST LESION US IMAG: CPT | Performed by: OBSTETRICS & GYNECOLOGY

## 2023-12-20 PROCEDURE — 77065 DX MAMMO INCL CAD UNI: CPT | Performed by: OBSTETRICS & GYNECOLOGY

## 2023-12-20 PROCEDURE — 88305 TISSUE EXAM BY PATHOLOGIST: CPT | Performed by: OBSTETRICS & GYNECOLOGY

## 2023-12-20 NOTE — PROCEDURES
[facility]  Procedure Note    [facility] Patient Status:  Outpatient    1970 MRN [id number]   Location [address] Attending Jimmie Ridley MD   Hosp Day # 0 PCP Vessie Fothergill, DO     Procedure: Left breast ultrasound guided biopsy    Pre-Procedure Diagnosis:  Left breast mass    Post-Procedure Diagnosis: Left breast mass    Anesthesia:  Local    Findings:  Left breast mass    Specimens: multiple cores    Blood Loss:  minimal    Tourniquet Time: none  Complications:  None  Drains:  none      Elisha Lay MD  2023

## 2023-12-21 ENCOUNTER — TELEPHONE (OUTPATIENT)
Dept: OBGYN CLINIC | Facility: CLINIC | Age: 53
End: 2023-12-21

## 2023-12-21 NOTE — IMAGING NOTE
Addison Arreola is s/p biopsy . Phoned and introduced myself as breast coordinator . Reinforced to patient  post biopsy care and instructions . Informed  and shared the pathology results as well as the recommendations from Dr. Delmi Giang for her breast imaging  as follows:      Pathology results shared (see epic for dictated pathology and radiology procedure report)  and recommendations are as follows:    CONCLUSION:   Uneventful ultrasound-guided biopsy of the focal area of heterogeneous fibroglandular tissue containing multiple ectatic ducts in the left at 08:00 o'clock 5 cm FN without immediate complications and marked with a vision shaped shaped clip   in appropriate positioning corresponding to the left breast mammographic finding of a mammogram focal asymmetry with associated architectural distortion. Pathology demonstrates benign findings including markedly dilated ducts. This is somewhat discordant with imaging given the presence of are mammographic architectural distortion. Surgical excision is recommended. RECOMMENDATION:  Surgical excision of the previously biopsied sonographic finding at 08:00 o'clock 5 cm from the nipple corresponding to a mammographic focal asymmetry with associated architectural distortion marked with a vision shaped biopsy clip due   to discordance between imaging and pathology findings. This would be ideally localized utilizing mammographic/tomosynthesis guidance. Dictated by (CST): Britta Jason MD on 12/20/2023 at 8:37 AM       Finalized by (CST): Britta Jason MD on 12/21/2023 at 1:43 PM        Patient being referred to Dr. Alvaro Griffith MD information given to patient to make appointment and staff message sent to Dr. Ophelia Bence team.     Addison Arreola acknowledges the above and denies questions. Addison Arreola was also instructed to perform breast self exams and if any changes  develops any changes to contact ordering  physician immediately  for re evaluation. Jose Roberto Reynolds verbalizes understanding and agreement.

## 2023-12-21 NOTE — TELEPHONE ENCOUNTER
Nurse from radiology calling to inquire which surgeon Dr Salomón Valente wants the patient to see. Please advise.

## 2023-12-26 ENCOUNTER — OFFICE VISIT (OUTPATIENT)
Dept: ENDOCRINOLOGY CLINIC | Facility: CLINIC | Age: 53
End: 2023-12-26
Payer: COMMERCIAL

## 2023-12-26 VITALS
DIASTOLIC BLOOD PRESSURE: 89 MMHG | HEART RATE: 108 BPM | BODY MASS INDEX: 29 KG/M2 | WEIGHT: 187 LBS | SYSTOLIC BLOOD PRESSURE: 136 MMHG

## 2023-12-26 DIAGNOSIS — E11.9 CONTROLLED TYPE 2 DIABETES MELLITUS WITHOUT COMPLICATION, WITHOUT LONG-TERM CURRENT USE OF INSULIN (HCC): Primary | ICD-10-CM

## 2023-12-26 LAB
CARTRIDGE LOT#: ABNORMAL NUMERIC
GLUCOSE BLOOD: 98
HEMOGLOBIN A1C: 5.9 % (ref 4.3–5.6)
TEST STRIP LOT #: NORMAL NUMERIC

## 2023-12-26 PROCEDURE — 3075F SYST BP GE 130 - 139MM HG: CPT

## 2023-12-26 PROCEDURE — 99214 OFFICE O/P EST MOD 30 MIN: CPT

## 2023-12-26 PROCEDURE — 3044F HG A1C LEVEL LT 7.0%: CPT

## 2023-12-26 PROCEDURE — 83036 HEMOGLOBIN GLYCOSYLATED A1C: CPT

## 2023-12-26 PROCEDURE — 3079F DIAST BP 80-89 MM HG: CPT

## 2023-12-26 PROCEDURE — 82947 ASSAY GLUCOSE BLOOD QUANT: CPT

## 2023-12-26 NOTE — PROGRESS NOTES
Name: Rob Cornell  Date: 12/26/23    Referring Physician: No ref. provider found    HISTORY OF PRESENT ILLNESS   Rob Cornell is a 48year old female who presents for follow up  for diabetes mellitus. She was admitted to the hospital 5/10/2022 after presenting to the ED with polyuria, polydipsia and generalized weakness. She was found to have significant hyperglycemia. Of note she had been taking prednisone for chronic idiopathic urticaria, but is now off prednisone after transitioning to Xolair. Since last visit has been overall feeling well but admits to more stress recently. She underwent breast biopsy for abnormal mammogram which was benign but she is still needing surgery to remove tissue. Has been worried about this.      Diabetes History:  Diagnosed- 5/2022    FH DM  -grandfather possibly    Prior glycohemoglobin were 13.2% 5/2022; 9.5% 6/2022; 6.4% 8/2022; 5.8% 12/2022; 5.6% 3/2023; 6.2% 8/2023; 5.9% POC today     Dietary compliance:      Recall:  Breakfast-yogurt with strawberries, raspberries and blueberries, juicing  Lunch-protein wraps with turkey, or sometimes just fruit at work during evening shift   Dinner-protein and some vegetables - more vegetables recently   Snack-nuts and sugar free jello , fruits   Beverages- water, ginger ale occasionally     Exercise: Yes- has been walking daily, active at work     Polyuria/polydipsia: No  Blurred vision: No     Episodes of hypoglycemia: None  Blood Glucose:    Checking 1-2 times per day- Reviewed logs:    96, 127, 94, 119, 130, 103, 88, 130, 112, 148, 86, 120, 93, 118, 112, 116, 89, 126, 112, 103, 125, 130, 112    Previous DM medications  70/30 mixed insulin- stopped after weaned from oral steroids  Metformin- GI side effects and fatigue     Ozempic- significant GI side effects   Trulicity- stopped when transitioned to Ozempic     Current DM Regimen:  Mounjaro 5mg subcutaneous weekly - tolerating medication    Modifying factors:  Medication adherence: yes   Recent steroids, illness or infections: No      REVIEW OF SYSTEMS  Eyes: Diabetic retinopathy present: No             Most recent visit to eye doctor in last 12 months: Yes    CV: Cardiovascular disease present: No         Hypertension present: Yes         Hyperlipidemia present: Yes         Peripheral Vascular Disease present: No    : Nephropathy present: No    Neuro: Neuropathy present: No    Skin: Infection or ulceration: No    Osteoporosis: No    Thyroid disease: No      Medications:     Current Outpatient Medications:     Tirzepatide (MOUNJARO) 5 MG/0.5ML Subcutaneous Solution Pen-injector, Inject 5 mg into the skin once a week., Disp: 2 mL, Rfl: 0    valsartan 160 MG Oral Tab, Take 1 tablet (160 mg total) by mouth daily. , Disp: 90 tablet, Rfl: 1    zolpidem (AMBIEN) 10 MG Oral Tab, Take 1 tablet (10 mg total) by mouth nightly as needed for Sleep., Disp: 30 tablet, Rfl: 5    albuterol (PROAIR HFA) 108 (90 Base) MCG/ACT Inhalation Aero Soln, Inhale 2 puffs into the lungs every 6 (six) hours as needed for Wheezing., Disp: 1 each, Rfl: 1    EPINEPHrine (EPIPEN 2-YANY) 0.3 MG/0.3ML Injection Solution Auto-injector, Inject IM in event of  allergic reaction, Disp: 1 each, Rfl: 0    montelukast (SINGULAIR) 10 MG Oral Tab, Take 1 tablet (10 mg total) by mouth nightly., Disp: 90 tablet, Rfl: 0    Glucose Blood (ONETOUCH VERIO) In Vitro Strip, To check sugars twice a day E 11.65 with insulin use, Disp: 200 strip, Rfl: 0    Omalizumab (XOLAIR) 150 MG/ML Subcutaneous Solution Prefilled Syringe, Inject 300 mg into the skin every 28 days. , Disp: 2 mL, Rfl: 2    Blood Glucose Monitoring Suppl ([address]) w/Device Does not apply Kit, 1 each As Directed.  To check sugars twice a day E 11.65 with insulin use, Disp: 1 kit, Rfl: 0    OneTouch Delica Lancets 57C Does not apply Misc, To check sugars twice a day E 11.65 with insulin use, Disp: 200 each, Rfl: 0    Blood Glucose Monitoring Suppl Does not apply Kit, Use as directed to check blood glucose twice a day - fasting and before dinner, Disp: 1 kit, Rfl: 0    Blood Glucose Monitoring Suppl Does not apply Device, Use as directed to check blood glucose twice a day -fasting and before dinner, Disp: 200 each, Rfl: 0    Microlet Lancets Does not apply Misc, Use as directed to check blood glucose twice a day - fasting and before dinner, Disp: 200 each, Rfl: 0    fluocinonide 0.05 % External Ointment, Apply to involved areas twice a day, Disp: 60 g, Rfl: 0    fluticasone propionate (FLONASE) 50 MCG/ACT Nasal Suspension, 2 sprays by Nasal route daily. , Disp: 3 each, Rfl: 0    fexofenadine 180 MG Oral Tab, Take 1 tablet (180 mg total) by mouth daily. (Patient not taking: Reported on 11/3/2023), Disp: , Rfl:     rosuvastatin 5 MG Oral Tab, Take 1 tablet (5 mg total) by mouth nightly. (Patient not taking: Reported on 11/3/2023), Disp: 30 tablet, Rfl: 3     Allergies:    Allergies   Allergen Reactions    Iodine (Topical) ANAPHYLAXIS, HIVES and UNKNOWN     Eyes swell, welts on body    Shellfish HIVES       Social History:   Social History     Socioeconomic History    Marital status:    Tobacco Use    Smoking status: Never    Smokeless tobacco: Never   Vaping Use    Vaping Use: Never used   Substance and Sexual Activity    Alcohol use: Yes     Comment: occasionally    Drug use: No    Sexual activity: Yes     Partners: Male     Birth control/protection: Implant     Comment: ESSURE   Other Topics Concern    Caffeine Concern Yes     Comment: coffee, 1/2 cup daily    Right Handed Yes       Medical History:   Past Medical History:   Diagnosis Date    Allergy     allergies    Amenorrhea 04/26/2016    Asthma     Decorative tattoo     Diabetes ([facility] [address]     High blood pressure     Hypercholesteremia 05/14/2018    Unspecified disorder of neck     overactive gland in neck, surgical removal 1991       Surgical history:   Past Surgical History:   Procedure Laterality Date HYSTEROSCOPY, STERILIZATION  2017    Essure    INCISIONAL BIOPSY SKIN SINGLE LESION Left 09/23/2020    left shoulder lipoma    LAXMI BIOPSY STEREO NODULE 1 SITE LEFT (CPT=19081)  12/08/2023    for focal asymetry; X clip    NECK/CHEST PROCEDURE UNLISTED  1991    surgical removal (overactive gland in neck)    NEEDLE BIOPSY LEFT Left 12/20/2023    US CNB    WRIST Vince Devoid LIG Right 12/10/2021    Right endoscopic carpal tunnel release         PHYSICAL EXAM  Vitals:    12/26/23 0911 12/26/23 0942   BP: (!) 145/102 136/89   Pulse: 108    Weight: 187 lb (84.8 kg)          General Appearance:  alert, well developed, in no acute distress  Eyes:  normal conjunctivae, sclera, and normal pupils  Neck: Trachea midline: Normal  Back: no kyphosis or back tenderness  Respiratory:  clear to auscultation bilaterally  Cardiovascular:  regular rate, rhythm, , no murmurs, S3 or S4  Lymph Nodes:  No abnormal nodes noted  Musculoskeletal:  normal muscle strength and tone  Skin:  normal moisture and skin texture  Hair & Nails:  normal scalp hair     Hematologic:  no excessive bruising  Neuro:  sensory grossly intact and motor grossly intact.   Psychiatric:  oriented to time, self, and place  Nutritional:  no abnormal weight gain or loss      ASSESSMENT/PLAN:    Diabetes mellitus type 2 Controlled   - HgA1c - 5.9%  -Congratulated patient on improved glycemic control   - Reviewed ABC's of diabetes  - Reviewed pathogenesis of diabetes.   -Congratulated patient on improved glycemic control.   - Reviewed importance of good glycemic control to prevent microvascular and macrovascular complications including nephropathy, neuropathy, retinopathy, and cardiovascular disease.  - Reviewed importance of SBGM- check glucose 2 times daily   - Reviewed target glucose goals for patient 80-120mg/dl fasting and <180 mg/dl post prandially   - Reviewed importance of following diabetic diet- recommended 135 grams of CHO per day or 45 grams per meal.   - Provided patient education materials  -SBGM logs show glucose levels at goal at home.     -Having persistent GI side effects with Trulicity and Ozempic. Does prefer to continue with weekly GLP- 1 agonist so will try alternative to see if better tolerated. -Continue Mounjaro 5mg subcutaneous weekly. Reviewed side effects and risks vs benefits of medication. -BP elevated at visit but improved on repeat. Has been normal previously- on valsartan 160mg PO daily   -Lipids normal 8/2023- LDL- 111. Started on Rosuvastatin 5mg PO daily at last visit- repeat labs before next visit. -no nephropathy  -UTD with optho   -normal foot exam 8/2023    RTC in 4 months   12/26/23  ANNAMARIE Guajardo      A total of 30 minutes was spent on obtaining history, reviewing pertinent imaging/labs and specialists notes, evaluating patient, providing multiple treatment options, reinforcing diet/exercise and compliance, and completing documentation.

## 2023-12-29 ENCOUNTER — OFFICE VISIT (OUTPATIENT)
Dept: FAMILY MEDICINE CLINIC | Facility: CLINIC | Age: 53
End: 2023-12-29
Payer: COMMERCIAL

## 2023-12-29 VITALS
OXYGEN SATURATION: 98 % | BODY MASS INDEX: 29.19 KG/M2 | HEART RATE: 95 BPM | WEIGHT: 186 LBS | SYSTOLIC BLOOD PRESSURE: 127 MMHG | DIASTOLIC BLOOD PRESSURE: 84 MMHG | HEIGHT: 67 IN

## 2023-12-29 DIAGNOSIS — I10 ESSENTIAL HYPERTENSION: Primary | ICD-10-CM

## 2023-12-29 DIAGNOSIS — N63.22 MASS OF UPPER INNER QUADRANT OF LEFT BREAST: ICD-10-CM

## 2023-12-29 DIAGNOSIS — K21.9 GASTROESOPHAGEAL REFLUX DISEASE, UNSPECIFIED WHETHER ESOPHAGITIS PRESENT: ICD-10-CM

## 2023-12-29 PROCEDURE — 3079F DIAST BP 80-89 MM HG: CPT | Performed by: FAMILY MEDICINE

## 2023-12-29 PROCEDURE — 99214 OFFICE O/P EST MOD 30 MIN: CPT | Performed by: FAMILY MEDICINE

## 2023-12-29 PROCEDURE — 3008F BODY MASS INDEX DOCD: CPT | Performed by: FAMILY MEDICINE

## 2023-12-29 PROCEDURE — 3074F SYST BP LT 130 MM HG: CPT | Performed by: FAMILY MEDICINE

## 2023-12-29 RX ORDER — OMEPRAZOLE 40 MG/1
40 CAPSULE, DELAYED RELEASE ORAL DAILY PRN
Qty: 30 CAPSULE | Refills: 5 | Status: SHIPPED | OUTPATIENT
Start: 2023-12-29 | End: 2024-12-23

## 2023-12-29 RX ORDER — MONTELUKAST SODIUM 10 MG/1
10 TABLET ORAL ONCE AS NEEDED
Qty: 30 TABLET | Refills: 5 | Status: SHIPPED | OUTPATIENT
Start: 2023-12-29 | End: 2023-12-29

## 2023-12-29 RX ORDER — VALSARTAN 160 MG/1
160 TABLET ORAL DAILY
Qty: 30 TABLET | Refills: 11 | Status: SHIPPED | OUTPATIENT
Start: 2023-12-29

## 2024-01-17 NOTE — TELEPHONE ENCOUNTER
RE: Plan of Care    Santa Gilman MD    Thank you for referring Ruben Chang. The following information reflects my assessment and plan of care.    Please review and sign the attached form to indicate your approval of the plan of care. Insurance compliance requires your approval be on this plan of care. After your review, please fax back all pages received. Should you have any questions, feel free to contact me.    Florinda Otero, SLP  [facility]  [address] [place] [address]  [address]  [facility] [address]  Phone: [phone]  Fax: [phone]           Plan of Care 24   Effective from: 2024  Effective to: 2024    Plan ID: [id number]            Participants as of Finalize on 2024    Name Type Comments Contact Info    Santa Gilman MD Referring Provider  [phone]    NELLI Cerrato Speech Language Pathologist             Ruben Chang \"Rufino\" MRN:[id number] (:1977 46 year old M)             Evaluation     Author: Shanna, Florinda, SLP Status: Signed Last edited: 2024  8:30 AM    Summary:TEP Change             Speech-Language Pathology Evaluation    Visit Type: Initial Evaluation  Visit: 1  Referring Provider: Santa Gilman MD  Medical Diagnosis (from order): Diagnosis Information      Diagnosis    Z90.02 (ICD-10-CM) - Acquired absence of larynx            Treatment diagnosis: Presence of Artificial Larynx  Date of onset: 2022  Diagnosis precautions: Total Neck Breather    Chart reviewed at time of initial evaluation (relevant co-morbidities, allergies, tests and medications listed): 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 TEP voicing.       SUBJECTIVE                                                                                                             Patient was last seen  due to periprosthetic leakage. At that time, the prosthesis  Noted was retraction and readjusted, the patient experienced a popping sensation internally and we suspected the prosthesis was better seated in the tract at that point. Esophageal flange deployment and seating was confirmed by ENT via scope the next day.      Patient presents back to the clinic with reports of intermittent periprosthetic leakage, especially around the 11 o'clock area of the prosthesis. Leakage was not seen this morning on assessment. Patient reports some irritation to that area, especially with liquids like energy drinks or coffee.   Pain / Symptoms:  - patient reports pain is not an issue/concern  Function:  - Prior level of function: no concerns with swallowing.  Patient/Caregiver Goals: To prolong life of device     Prior treatment: outpatient speech  Discharged from hospital, home health, or skilled nursing facility in last 30 days: no  Home Environment:   Assistance available: consistent  Feels safe at home/work/school.  2 or more falls or an unexplained fall with injury in the last year:  No      OBJECTIVE                                                                                                                                 Laryngectomy   Observation:       Laryngectomy date: 9/16/2021  Tracheoesophageal Speech     Date of last tracheoesophageal prosthesis replacement: 4/26/2023    Prosthesis type: Provox Jeffers    Size: 20 Fr.  Size: 6 mm  Fit: good  Leakage: central    Last Prosthesis Change: 11-    Voice Prosthesis Change:  Current Voice Prosthesis Size:  20Fr 6mm Jeffers  Current Voice Prosthesis Type: Provox Indwelling   Current Voice Prosthesis Fit: good  Leakage: periprosthetic leakage      Dilated to 22Fr for >2 minutes. Prosthesis sized for ~4mm on the left side of the tract but around 5mm on the right side of the tract. No significant pistoning was visualized prior to removal. Discussed trying a 4mm device vs a 6mm device with patient.  Decision was made to try a 6mm device  again with option of trying a 4mm prosthesis if he continues to have periprosthetic leakage.   Replacement Prosthesis Size:  20 Fr 6mm Provox Jeffers  Replacement Prosthesis Type: Provox Indwelling  Replacement Voice Prosthesis Fit: good  Leakage following voice prosthesis replacement: none    Equipment/Supplies provided to patient: prosthesis brush          Communication Mode     Mode: tracheosophageal puncture           ASSESSMENT                                                                                                          Presence of Artificial Larynx that has reported functional limitations listed above.  Successful TEP replacement.  Placement confirmed with retraction, rotation, voicing and the absence of leakage.  Currently no periprosthetic leakage observed.  (+) 360 degree rotation initially, however rotation was less fluid than typical.  This may be due to irritation/swelling; however would have a low threshold for returning to the clinic for further evaluation if there is anything awry with this device.     Patient to follow up with SLP when this device fails or in 6 months, which ever occurs first. Follow up with ENT as per plan of care.     Education:   - Present and ready to learn: patient  - Results of above outlined education: Demonstrates understanding and Verbalizes understanding    PLAN                                                                                                                           Patient involved in and agreed to plan of care and goals.       Therapy procedure time and total treatment time can be found documented on the Time Entry flowsheet         Current Participants as of 1/17/2024    Name Type Comments Contact Info    Santa Gilman MD Referring Provider  [phone]    Signature pending    NELLI Cerrato Speech Language Pathologist      Electronically signed by NELLI Cerrato at 1/17/2024 6291 CST          RE: Plan of Care for Ruben HOFF  Charlene, YOB: 1977     I certify the need for these services, furnished under this plan of treatment and while under my care.  I agree with the plan of care as stated and request that therapy proceed.        __________________________________________________________________________________  Provider Signature         Date

## 2024-01-25 RX ORDER — TIRZEPATIDE 5 MG/.5ML
5 INJECTION, SOLUTION SUBCUTANEOUS WEEKLY
Qty: 2 ML | Refills: 0 | Status: SHIPPED | OUTPATIENT
Start: 2024-01-25

## 2024-01-31 ENCOUNTER — OFFICE VISIT (OUTPATIENT)
Dept: SURGERY | Facility: CLINIC | Age: 54
End: 2024-01-31
Payer: COMMERCIAL

## 2024-01-31 VITALS
OXYGEN SATURATION: 100 % | HEART RATE: 95 BPM | SYSTOLIC BLOOD PRESSURE: 147 MMHG | WEIGHT: 185 LBS | RESPIRATION RATE: 19 BRPM | DIASTOLIC BLOOD PRESSURE: 90 MMHG | TEMPERATURE: 98 F | BODY MASS INDEX: 29.03 KG/M2 | HEIGHT: 67 IN

## 2024-01-31 DIAGNOSIS — R92.8 ABNORMAL MAMMOGRAM: Primary | ICD-10-CM

## 2024-01-31 PROCEDURE — 3008F BODY MASS INDEX DOCD: CPT | Performed by: SURGERY

## 2024-01-31 PROCEDURE — 99244 OFF/OP CNSLTJ NEW/EST MOD 40: CPT | Performed by: SURGERY

## 2024-01-31 PROCEDURE — 3077F SYST BP >= 140 MM HG: CPT | Performed by: SURGERY

## 2024-01-31 PROCEDURE — 3080F DIAST BP >= 90 MM HG: CPT | Performed by: SURGERY

## 2024-01-31 NOTE — PROGRESS NOTES
Breast Surgery New Patient Consultation    This is the first visit for this 53 year old woman, referred by Dr. Clark, who presents for evaluation of abnormal mammogram.    History of Present Illness:   Ms. Paula Brooks is a 53 year old woman who presents with an abnormal mammogram.  The patient denies any palpable masses, nipple discharge, skin changes or axillary symptoms.  She has no known family history of breast cancer.  She has no personal prior history of breast disease or biopsies.  She presented for screening mammogram on November 27, 2023 and was found to have asymmetry with distortion in the left breast as well as calcifications and bilateral axillary adenopathy.  She was recalled for further imaging which took place on December 4, 2023 and was found to have ductal ectasia of the left breast with a persistent asymmetry of the left breast for which biopsy was recommended and calcifications thought December 20, 2023 and was found to have fragments of benign breast tissue.  This was thought to be discordant and surgical excision has been recommended.  She is here today for evaluation and recommendations for further therapy.        Past Medical History:   Diagnosis Date    Allergy     allergies    Amenorrhea 04/26/2016    Asthma     Decorative tattoo     Diabetes (HCC)     High blood pressure     Hypercholesteremia 05/14/2018    Unspecified disorder of neck     overactive gland in neck, surgical removal 1991       Past Surgical History:   Procedure Laterality Date    HYSTEROSCOPY, STERILIZATION  2017    Essure    INCISIONAL BIOPSY SKIN SINGLE LESION Left 09/23/2020    left shoulder lipoma    LAXMI BIOPSY STEREO NODULE 1 SITE LEFT (CPT=19081)  12/08/2023    for focal asymetry; X clip    NECK/CHEST PROCEDURE UNLISTED  1991    surgical removal (overactive gland in neck)    NEEDLE BIOPSY LEFT Left 12/20/2023    US [facility]    WRIST ARTHROSCOP,RELEASE XVERS LIG Right 12/10/2021    Right endoscopic carpal tunnel  release       Gynecological History:  Pt is a   Pt was 20 years old at time of first pregnancy.    She denies any cumulative breastfeeding history  She achieved menarche at age 15 and LMP age 51  Age of Menopause: 51  Type: natural menopause  She denies any history of hormone replacement therapy   She has history of oral contraceptive use, used in the past  She denies infertility treatment to achieve pregnancy.    Medications:    No outpatient medications have been marked as taking for the 24 encounter (Appointment) with Sahra Dunn MD.       Allergies:    Allergies   Allergen Reactions    Iodine (Topical) ANAPHYLAXIS, HIVES and UNKNOWN     Eyes swell, welts on body    Shellfish HIVES       Family History:   Family History   Problem Relation Age of Onset    Diabetes Mother     Cancer Mother         lung CA    Heart Disease Father     Allergies Son     No Known Problems Sister     No Known Problems Brother     No Known Problems Brother     No Known Problems Brother        She is not of Ashkenazi Roman Catholic ancestry.    Social History:  History   Alcohol Use    Yes     Comment: occasionally       History   Smoking Status    Never   Smokeless Tobacco    Never       Review of Systems:  General:   The patient denies, fever, chills, night sweats, fatigue, generalized weakness, change in appetite or weight loss.    HEENT:     The patient denies eye irritation, cataracts, redness, glaucoma, yellowing of the eyes, change in vision, color blindness, or +wearing contacts/glasses. The patient denies hearing loss, ringing in the ears, ear drainage, earaches, nasal congestion, nose bleeds, snoring, pain in mouth/throat, hoarseness, change in voice, facial trauma.    Respiratory:  The patient denies chronic cough, phlegm, hemoptysis, pleurisy/chest pain, pneumonia, asthma, wheezing, difficulty in breathing with exertion, emphysema, chronic bronchitis, shortness of breath or abnormal sound when breathing.      Cardiovascular:  There is no history of chest pain, chest pressure/discomfort, palpitations, irregular heartbeat, fainting or near-fainting, difficulty breathing when lying flat, SOB/Coughing at night, swelling of the legs or chest pain while walking.    Breasts:  See history of present illness    Gastrointestinal:     There is no history of difficulty or pain with swallowing, reflux symptoms, vomiting, dark or bloody stools, constipation, yellowing of the skin, indigestion, nausea, change in bowel habits, diarrhea, abdominal pain or vomiting blood.     Genitourinary:  The patient denies frequent urination, needing to get up at night to urinate, urinary hesitancy or retaining urine, painful urination, urinary incontinence, decreased urine stream, blood in the urine or vaginal/penile discharge.    Skin:    The patient denies rash, itching, skin lesions, dry skin, change in skin color or change in moles.     Hematologic/Lymphatic:  The patient denies easily bruising or bleeding or persistent swollen glands or lymph nodes.     Musculoskeletal:  The patient denies muscle aches/pain, joint pain, stiff joints, neck pain, back pain or bone pain.    Neuropsychiatric:  There is no history of migraines or severe headaches, seizure/epilepsy, speech problems, coordination problems, trembling/tremors, fainting/black outs, dizziness, memory problems, loss of sensation/numbness, problems walking, weakness, tingling or burning in hands/feet. There is no history of abusive relationship, bipolar disorder, sleep disturbance, anxiety, depression or feeling of despair.    Endocrine:    There is no history of poor/slow wound healing, weight loss/gain, fertility or hormone problems, cold intolerance, thyroid disease.     Allergic/Immunologic:  There is no history of hives, hay fever, angioedema or anaphylaxis.    /90 (BP Location: Left arm, Patient Position: Sitting, Cuff Size: adult)   Pulse 95   Temp 97.8 °F (36.6 °C)  (Temporal)   Resp 19   Ht 1.702 m (5' 7\")   Wt 83.9 kg (185 lb)   SpO2 100%   BMI 28.98 kg/m²     Physical Exam:  The patient is an alert, oriented, well-nourished and  well-developed woman who appears her stated age. Her speech patterns and movements are normal. Her affect is appropriate.    HEENT: The head is normocephalic. The neck is supple. The thyroid is not enlarged and is without palpable masses/nodules. There are no palpable masses. The trachea is in the midline. Conjunctiva are clear, non-icteric.    Chest: The chest expands symmetrically. The lungs are clear to auscultation.    Heart: The rhythm is regular.  There are no murmurs, rubs, gallops or thrills.    Breasts:  Her breasts are symmetrical with a cup size 38DD.  Right breast: The skin, nipple ,and areola appear normal. There is no skin dimpling with movement of the pectoralis. There is no nipple retraction. No nipple discharge can be elicited. The parenchyma is mildly nodular. There are no dominant masses in the breast. The axillary tail is normal.  Left breast:   The skin, nipple, and areola appear normal. There is no skin dimpling with movement of the pectoralis. There is no nipple retraction. No nipple discharge can be elicited. The parenchyma is mildly nodular. There are no dominant masses in the breast. The axillary tail is normal.    Abdomen:  The abdomen is soft, flat and non tender. The liver is not enlarged. There are no palpable masses.    Lymph Nodes:  The supraclavicular, axillary and cervical regions are free of significant lymphadenopathy.    Back: There is no vertebral column tenderness.    Skin: The skin appears normal. There are no suspicious appearing rashes or lesions.    Extremities: The extremities are without deformity, cyanosis or edema.    Impression:   Ms. Paula Brooks is a 53 year old woman presents with abnormal mammogram of left breast.    Discussion and Plan:  I had a discussion with the Patient regarding her  breast exam. On exam today I found her to be healing well since the biopsy with no other clinical findings.  I personally reviewed the recent imaging we discussed this as well as her pathology at length.    The finding of the distortion cannot be explained by the benign pathological results and therefore this is considered discordant.  For this reason I agree with a left breast wire localized excision of this area to exclude any concerning pathological disease in this location. The risks and possible complications of the procedure were explained to the patient and her family and she understood and agreed to the proposed plan. She was given ample opportunity for questions and those questions were answered to her satisfaction. She has been  encouraged to contact the office with any questions or concerns prior to her next appointment.

## 2024-01-31 NOTE — PATIENT INSTRUCTIONS
Dr. Sahra Dunn  Tel: [phone]  Fax: [phone] [facility]  [address] [place] [address]  [phone]     Surgery/Procedure: Left breast wire localized excisional biopsy     Anesthesia:   MAC  Surgery Length:   45 minutes CPT:  79806   Wire LOC:   Yes Nuc Med:   No   Latonya Seed:  No       Dx & ICD-10: Abnormal mammogram (R92.8)   Radiology Instructions: Left breast, 8 o'clock position, 5 cm from the nipple, vision shaped clip, biopsy demonstrates benign breast tissue which is discordant from imaging.    _______________________________________________________________________________    Someone must accompany you the day of the procedure to drive you home safely, because of anesthesia.  You will need an adult  to stay with you the first night following your surgery.  You must remove any kind of makeup, acrylic nails, lotions, powders, creams or deodorant.  [facility] ONLY: Pre-admission will give instruct you on when to take Gatorade and Tylenol/acetaminophen prior to your surgery, purchase 2 - 12oz bottles of regular Gatorade (NOT RED/SUGAR FREE). Otherwise, you may not eat or drink anything else after 11PM the night before surgery.    [facility]: You may not eat or drink anything after midnight the day of your surgery.   Wear comfortable clothing that can be easily removed.  If you wear dentures, contacts lenses, or any prosthesis, you will be asked to remove them.  Do not drink alcohol or smoke 24 hours prior to your procedure.  Bring a picture ID and your insurance card.  Covid-19 testing is no longer required before surgery unless you are experiencing symptoms such as fever, cough, congestion, etc.   The Pre-Admission Testing Department will call the day before to confirm your procedure, give you the time you need to arrive by and directions on where to go. They begin making calls after 2pm, if you are not contacted by 4pm, please call the surgeon's office listed  above.  Do not take any blood thinners at least one week prior to the procedure/surgery. This includes aspirin, baby aspirin, Ibuprofen products, herbal supplements, diet medications, vitamin E, fish oil and green tea supplements. Please check other supplements for these ingredients. *TYLENOL or acetaminophen is acceptable*  If you take Coumadin, Plavix, Xarelto, or Eliquis, please contact your prescribing physician for special instructions on how long to hold. If you take insulin contact your primary care physician for special instructions.  Our surgery scheduler, Annemarie, will be contacting you to discuss surgery dates. If you have any questions related to scheduling your surgery, please reach out to her at [phone].  _____________________________________________________________________  PRE-OPERATIVE TESTING IF INDICATED BELOW  PLEASE COMPLETE ASAP (AT LEAST 14-21 DAYS PRIOR TO SURGERY)  [] CBC [x] BMP [] CMP [x] EKG    [] PT, PTT, INR [] Cardiac Clearance  [x] H&P Medical Clearance [] Chest X-ray     Please call Central Scheduling to schedule an appointment for pre-operative labs/tests @ [phone]  Does the patient have a pacemaker or ICD?           Does the patient have sleep apnea?  [] Yes   [x] No                               [] Yes   [x] No

## 2024-02-01 ENCOUNTER — TELEPHONE (OUTPATIENT)
Dept: ENDOCRINOLOGY CLINIC | Facility: CLINIC | Age: 54
End: 2024-02-01

## 2024-02-01 ENCOUNTER — TELEPHONE (OUTPATIENT)
Dept: FAMILY MEDICINE CLINIC | Facility: CLINIC | Age: 54
End: 2024-02-01

## 2024-02-01 ENCOUNTER — TELEPHONE (OUTPATIENT)
Dept: SURGERY | Facility: CLINIC | Age: 54
End: 2024-02-01

## 2024-02-01 DIAGNOSIS — R92.8 ABNORMAL MAMMOGRAM: Primary | ICD-10-CM

## 2024-02-01 NOTE — TELEPHONE ENCOUNTER
Prior Authorization     KEY:  [id number]  Patient last name:  Todd  : 1970    Current Outpatient Medications   Medication Sig Dispense Refill    Tirzepatide (MOUNJARO) 5 MG/0.5ML Subcutaneous Solution Pen-injector Inject 5 mg into the skin once a week. 2 mL 0

## 2024-02-01 NOTE — TELEPHONE ENCOUNTER
Patient is scheduled for surgery on 2/22/24    Dr. Sahra Dunn  Surgical Oncology  [facility]    Left breast biopsy    Phone #[phone]

## 2024-02-01 NOTE — TELEPHONE ENCOUNTER
Calling pt in regards to scheduling surgery.  Informed pt that I have 02/22/2024 available at [facility] with Dr. Dunn.  Pt verbalized understanding and in agreement with date and location.  All questions answered.   Encouraged pt to call or [facility] message office with any other questions or concerns.

## 2024-02-02 ENCOUNTER — LAB ENCOUNTER (OUTPATIENT)
Dept: LAB | Age: 54
End: 2024-02-02
Attending: FAMILY MEDICINE
Payer: COMMERCIAL

## 2024-02-02 ENCOUNTER — OFFICE VISIT (OUTPATIENT)
Dept: FAMILY MEDICINE CLINIC | Facility: CLINIC | Age: 54
End: 2024-02-02
Payer: COMMERCIAL

## 2024-02-02 VITALS
OXYGEN SATURATION: 97 % | WEIGHT: 188 LBS | BODY MASS INDEX: 29.51 KG/M2 | SYSTOLIC BLOOD PRESSURE: 148 MMHG | DIASTOLIC BLOOD PRESSURE: 88 MMHG | HEIGHT: 67 IN | HEART RATE: 94 BPM

## 2024-02-02 DIAGNOSIS — Z01.818 PRE-OP EXAMINATION: Primary | ICD-10-CM

## 2024-02-02 DIAGNOSIS — N63.22 MASS OF UPPER INNER QUADRANT OF LEFT BREAST: ICD-10-CM

## 2024-02-02 DIAGNOSIS — Z01.818 PRE-OP EXAMINATION: ICD-10-CM

## 2024-02-02 LAB
ANION GAP SERPL CALC-SCNC: 5 MMOL/L (ref 0–18)
BASOPHILS # BLD AUTO: 0.03 X10(3) UL (ref 0–0.2)
BASOPHILS NFR BLD AUTO: 0.5 %
BUN BLD-MCNC: 14 MG/DL (ref 9–23)
BUN/CREAT SERPL: 16.9 (ref 10–20)
CALCIUM BLD-MCNC: 9.2 MG/DL (ref 8.7–10.4)
CHLORIDE SERPL-SCNC: 109 MMOL/L (ref 98–112)
CO2 SERPL-SCNC: 28 MMOL/L (ref 21–32)
CREAT BLD-MCNC: 0.83 MG/DL
DEPRECATED RDW RBC AUTO: 43.6 FL (ref 35.1–46.3)
EGFRCR SERPLBLD CKD-EPI 2021: 85 ML/MIN/1.73M2 (ref 60–?)
EOSINOPHIL # BLD AUTO: 0.11 X10(3) UL (ref 0–0.7)
EOSINOPHIL NFR BLD AUTO: 2 %
ERYTHROCYTE [DISTWIDTH] IN BLOOD BY AUTOMATED COUNT: 12.6 % (ref 11–15)
FASTING STATUS PATIENT QL REPORTED: NO
GLUCOSE BLD-MCNC: 108 MG/DL (ref 70–99)
HCT VFR BLD AUTO: 40.5 %
HGB BLD-MCNC: 12.7 G/DL
IMM GRANULOCYTES # BLD AUTO: 0.01 X10(3) UL (ref 0–1)
IMM GRANULOCYTES NFR BLD: 0.2 %
LYMPHOCYTES # BLD AUTO: 1.51 X10(3) UL (ref 1–4)
LYMPHOCYTES NFR BLD AUTO: 27.2 %
MCH RBC QN AUTO: 29.9 PG (ref 26–34)
MCHC RBC AUTO-ENTMCNC: 31.4 G/DL (ref 31–37)
MCV RBC AUTO: 95.3 FL
MONOCYTES # BLD AUTO: 0.49 X10(3) UL (ref 0.1–1)
MONOCYTES NFR BLD AUTO: 8.8 %
NEUTROPHILS # BLD AUTO: 3.4 X10 (3) UL (ref 1.5–7.7)
NEUTROPHILS # BLD AUTO: 3.4 X10(3) UL (ref 1.5–7.7)
NEUTROPHILS NFR BLD AUTO: 61.3 %
OSMOLALITY SERPL CALC.SUM OF ELEC: 295 MOSM/KG (ref 275–295)
PLATELET # BLD AUTO: 251 10(3)UL (ref 150–450)
POTASSIUM SERPL-SCNC: 3.6 MMOL/L (ref 3.5–5.1)
RBC # BLD AUTO: 4.25 X10(6)UL
SODIUM SERPL-SCNC: 142 MMOL/L (ref 136–145)
WBC # BLD AUTO: 5.6 X10(3) UL (ref 4–11)

## 2024-02-02 PROCEDURE — 80048 BASIC METABOLIC PNL TOTAL CA: CPT

## 2024-02-02 PROCEDURE — 85025 COMPLETE CBC W/AUTO DIFF WBC: CPT

## 2024-02-02 PROCEDURE — 93010 ELECTROCARDIOGRAM REPORT: CPT | Performed by: INTERNAL MEDICINE

## 2024-02-02 PROCEDURE — 99214 OFFICE O/P EST MOD 30 MIN: CPT | Performed by: FAMILY MEDICINE

## 2024-02-02 PROCEDURE — 3008F BODY MASS INDEX DOCD: CPT | Performed by: FAMILY MEDICINE

## 2024-02-02 PROCEDURE — 93005 ELECTROCARDIOGRAM TRACING: CPT

## 2024-02-02 PROCEDURE — 36415 COLL VENOUS BLD VENIPUNCTURE: CPT

## 2024-02-02 PROCEDURE — 3079F DIAST BP 80-89 MM HG: CPT | Performed by: FAMILY MEDICINE

## 2024-02-02 PROCEDURE — 3077F SYST BP >= 140 MM HG: CPT | Performed by: FAMILY MEDICINE

## 2024-02-02 NOTE — PROGRESS NOTES
Subjective:   Paula Brooks is a 53 year old female who presents for Pre-Op Exam (Left breast wire localized excisional biopsy Scheduled for 2/22/24 with Surgeon Sahra Dunn MD //)       History/Other:    Chief Complaint Reviewed and Verified  Nursing Notes Reviewed and   Verified  Tobacco Reviewed  Allergies Reviewed  Medications Reviewed    Problem List Reviewed  Medical History Reviewed  Surgical History   Reviewed  OB Status Reviewed  Family History Reviewed  Social History   Reviewed         Tobacco:  She has never smoked tobacco.    Current Outpatient Medications   Medication Sig Dispense Refill    Tirzepatide (MOUNJARO) 5 MG/0.5ML Subcutaneous Solution Pen-injector Inject 5 mg into the skin once a week. 2 mL 0    valsartan 160 MG Oral Tab Take 1 tablet (160 mg total) by mouth daily. 30 tablet 11    Omeprazole 40 MG Oral Capsule Delayed Release Take 1 capsule (40 mg total) by mouth daily as needed. 30 capsule 5    zolpidem (AMBIEN) 10 MG Oral Tab Take 1 tablet (10 mg total) by mouth nightly as needed for Sleep. 30 tablet 5    albuterol (PROAIR HFA) 108 (90 Base) MCG/ACT Inhalation Aero Soln Inhale 2 puffs into the lungs every 6 (six) hours as needed for Wheezing. 1 each 1    EPINEPHrine (EPIPEN 2-YANY) 0.3 MG/0.3ML Injection Solution Auto-injector Inject IM in event of  allergic reaction 1 each 0    rosuvastatin 5 MG Oral Tab Take 1 tablet (5 mg total) by mouth nightly. 30 tablet 3    Glucose Blood (ONETOUCH VERIO) In Vitro Strip To check sugars twice a day E 11.65 with insulin use 200 strip 0    Blood Glucose Monitoring Suppl (ONETOUCH VERIO FLEX SYSTEM) w/Device Does not apply Kit 1 each As Directed. To check sugars twice a day E 11.65 with insulin use 1 kit 0    OneTouch Delica Lancets 33G Does not apply Misc To check sugars twice a day E 11.65 with insulin use 200 each 0    Blood Glucose Monitoring Suppl Does not apply Kit Use as directed to check blood glucose twice a day - fasting and  before dinner 1 kit 0    Blood Glucose Monitoring Suppl Does not apply Device Use as directed to check blood glucose twice a day -fasting and before dinner 200 each 0    Microlet Lancets Does not apply Misc Use as directed to check blood glucose twice a day - fasting and before dinner 200 each 0    Omalizumab (XOLAIR) 150 MG/ML Subcutaneous Solution Prefilled Syringe Inject 300 mg into the skin every 28 days. (Patient not taking: Reported on 12/29/2023) 2 mL 2         Review of Systems:  Review of Systems   Constitutional: Negative.    HENT: Negative.     Eyes: Negative.    Respiratory: Negative.     Cardiovascular: Negative.    Gastrointestinal: Negative.    Genitourinary: Negative.    Musculoskeletal: Negative.    Skin: Negative.    Neurological: Negative.    Psychiatric/Behavioral: Negative.           Objective:   /88   Pulse 94   Ht 5' 7\" (1.702 m)   Wt 188 lb (85.3 kg)   SpO2 97%   BMI 29.44 kg/m²  Estimated body mass index is 29.44 kg/m² as calculated from the following:    Height as of this encounter: 5' 7\" (1.702 m).    Weight as of this encounter: 188 lb (85.3 kg).  Physical Exam  Vitals reviewed.   Constitutional:       Appearance: Normal appearance.   HENT:      Head: Normocephalic.      Right Ear: Tympanic membrane and ear canal normal.      Left Ear: Tympanic membrane and ear canal normal.   Eyes:      Extraocular Movements: Extraocular movements intact.      Pupils: Pupils are equal, round, and reactive to light.   Cardiovascular:      Rate and Rhythm: Normal rate and regular rhythm.      Heart sounds: Normal heart sounds.   Pulmonary:      Effort: Pulmonary effort is normal.      Breath sounds: Normal breath sounds.   Abdominal:      General: Abdomen is flat.      Palpations: Abdomen is soft.   Musculoskeletal:      Cervical back: Normal range of motion.      Right lower leg: No edema.      Left lower leg: No edema.   Neurological:      General: No focal deficit present.      Mental Status:  She is alert.   Psychiatric:         Mood and Affect: Mood normal.           Assessment & Plan:   1. Pre-op examination (Primary)  -     CBC With Differential With Platelet; Future; Expected date: 02/02/2024  -     Basic Metabolic Panel (8); Future; Expected date: 02/02/2024  -     EKG 12 Lead; Future; Expected date: 02/02/2024  2. Mass of upper inner quadrant of left breast    Labs and ECG prior to surgical clearance.       No follow-ups on file.    Wayne Clark DO, 2/2/2024, 3:23 PM

## 2024-02-03 LAB
ATRIAL RATE: 70 BPM
P AXIS: 69 DEGREES
P-R INTERVAL: 150 MS
Q-T INTERVAL: 390 MS
QRS DURATION: 70 MS
QTC CALCULATION (BEZET): 421 MS
R AXIS: 2 DEGREES
T AXIS: 6 DEGREES
VENTRICULAR RATE: 70 BPM

## 2024-02-03 NOTE — TELEPHONE ENCOUNTER
Medication PA Requested:  Mounjaro 5mg/0.5mL                                                  CoverMyMeds Used:  Key:  Quantity: 2mL  Day Supply: 30  Sig: Inject 5 mg into the skin once a week.   DX Code:

## 2024-02-08 ENCOUNTER — NURSE TRIAGE (OUTPATIENT)
Dept: FAMILY MEDICINE CLINIC | Facility: CLINIC | Age: 54
End: 2024-02-08

## 2024-02-08 NOTE — TELEPHONE ENCOUNTER
Action Requested: Summary for Provider     []  Critical Lab, Recommendations Needed  [] Need Additional Advice  []   FYI    []   Need Orders  [] Need Medications Sent to Pharmacy  []  Other     SUMMARY: Per protocol advised : Office visit within 3 days    Future Appointments   Date Time Provider Department Center   2024  5:30 PM [facility] Clark DO [facility]   2024  9:00 AM [facility]   2024  3:00 PM Sahra Dunn MD [facility]   2024  9:15 AM Marylin Harrington APRN [facility]           Reason for call: Acute (Joint stiffness )  Onset: Data Unavailable  Patient calling ( identified name and  )I regards to [facility] message below   The pain is usually in her knees and shoulders,takes Aleve with very slight relief, onset of one week   Clenching her teeth at night and jaw feels painful ( very stressful )     Care Advice             Patient/Caregiver understands and will follow care advice?: Yes, plans to follow advice                        SEE IN OFFICE WITHIN 3 DAYS:   * You need to be examined.   * Let me give you an appointment.    PAIN MEDICINES:  * For pain relief, you can take either acetaminophen, ibuprofen, or naproxen.  * They are over-the-counter (OTC) pain drugs. You can buy them at the drugstore.  * ACETAMINOPHEN - REGULAR STRENGTH TYLENOL: Take 650 mg (two 325 mg pills) by mouth every 4 to 6 hours as needed. Each Regular Strength Tylenol pill has 325 mg of acetaminophen. The most you should take each day is 3,250 mg (10 pills a day).   * ACETAMINOPHEN - EXTRA STRENGTH TYLENOL: Take 1,000 mg (two 500 mg pills) every 8 hours as needed. Each Extra Strength Tylenol pill has 500 mg of acetaminophen. The most you should take each day is 3,000 mg (6 pills a day).  * IBUPROFEN (E.G., MOTRIN, ADVIL): Take 400 mg (two 200 mg pills) by mouth every 6 hours. The most you should take each day is 1,200 mg (six 200 mg pills), unless  your doctor has told you to take more.  * NAPROXEN (E.G., ALEVE): Take 220 mg (one 220 mg pill) by mouth every 8 to 12 hours as needed. You may take 440 mg (two 220 mg pills) for your first dose. The most you should take each day is 660 mg (three 220 mg pills a day), unless your doctor has told you to take more.    REST:  * You should try to avoid any exercise or activity that caused this pain for the next 3 days.    CALL BACK IF:  * Moderate pain (e.g., limping) lasts more than 3 days  * Mild pain lasts more than 7 days  * You become worse                   Patient verbalizes understanding and agrees with plan.                 2/7/24  1:52 PM  I've been waking up with some joint stiffness the past few days ... I have taken aleve and it helps as the day progress.  But I was thinking maybe I could take vitamin D as well ... The stiffness is in my shoulders, my knees and jaw .   Reason for Disposition   MILD knee pain persists > 7 days    Protocols used: Knee Pain-A-OH

## 2024-02-12 ENCOUNTER — OFFICE VISIT (OUTPATIENT)
Dept: FAMILY MEDICINE CLINIC | Facility: CLINIC | Age: 54
End: 2024-02-12
Payer: COMMERCIAL

## 2024-02-12 ENCOUNTER — LAB ENCOUNTER (OUTPATIENT)
Dept: LAB | Facility: HOSPITAL | Age: 54
End: 2024-02-12
Attending: FAMILY MEDICINE
Payer: COMMERCIAL

## 2024-02-12 VITALS
BODY MASS INDEX: 29.03 KG/M2 | SYSTOLIC BLOOD PRESSURE: 146 MMHG | OXYGEN SATURATION: 100 % | DIASTOLIC BLOOD PRESSURE: 94 MMHG | HEIGHT: 67 IN | HEART RATE: 83 BPM | WEIGHT: 185 LBS

## 2024-02-12 DIAGNOSIS — M25.512 PAIN OF BOTH SHOULDER JOINTS: Primary | ICD-10-CM

## 2024-02-12 DIAGNOSIS — M25.562 ARTHRALGIA OF BOTH KNEES: ICD-10-CM

## 2024-02-12 DIAGNOSIS — M25.511 PAIN OF BOTH SHOULDER JOINTS: ICD-10-CM

## 2024-02-12 DIAGNOSIS — M25.511 PAIN OF BOTH SHOULDER JOINTS: Primary | ICD-10-CM

## 2024-02-12 DIAGNOSIS — M25.512 PAIN OF BOTH SHOULDER JOINTS: ICD-10-CM

## 2024-02-12 DIAGNOSIS — M25.561 ARTHRALGIA OF BOTH KNEES: ICD-10-CM

## 2024-02-12 LAB
CRP SERPL-MCNC: <0.4 MG/DL (ref ?–1)
VIT D+METAB SERPL-MCNC: 23.7 NG/ML (ref 30–100)

## 2024-02-12 PROCEDURE — 82306 VITAMIN D 25 HYDROXY: CPT

## 2024-02-12 PROCEDURE — 3008F BODY MASS INDEX DOCD: CPT | Performed by: FAMILY MEDICINE

## 2024-02-12 PROCEDURE — 36415 COLL VENOUS BLD VENIPUNCTURE: CPT

## 2024-02-12 PROCEDURE — 3080F DIAST BP >= 90 MM HG: CPT | Performed by: FAMILY MEDICINE

## 2024-02-12 PROCEDURE — 99214 OFFICE O/P EST MOD 30 MIN: CPT | Performed by: FAMILY MEDICINE

## 2024-02-12 PROCEDURE — 86140 C-REACTIVE PROTEIN: CPT

## 2024-02-12 PROCEDURE — 3077F SYST BP >= 140 MM HG: CPT | Performed by: FAMILY MEDICINE

## 2024-02-13 ENCOUNTER — PATIENT MESSAGE (OUTPATIENT)
Dept: FAMILY MEDICINE CLINIC | Facility: CLINIC | Age: 54
End: 2024-02-13

## 2024-02-13 NOTE — PROGRESS NOTES
Subjective:   Paula Brooks is a 53 year old female who presents for Joint Pain (2 weeks ago pt notice joint stiffness in knees , jaws and shoulders during the morning time. )       History/Other:    Chief Complaint Reviewed and Verified  Nursing Notes Reviewed and   Verified  Tobacco Reviewed  Allergies Reviewed  Medications Reviewed    Problem List Reviewed  Medical History Reviewed  Surgical History   Reviewed  OB Status Reviewed  Family History Reviewed  Social History   Reviewed         Tobacco:  She has never smoked tobacco.    Current Outpatient Medications   Medication Sig Dispense Refill    Tirzepatide (MOUNJARO) 5 MG/0.5ML Subcutaneous Solution Pen-injector Inject 5 mg into the skin once a week. 2 mL 0    valsartan 160 MG Oral Tab Take 1 tablet (160 mg total) by mouth daily. 30 tablet 11    Omeprazole 40 MG Oral Capsule Delayed Release Take 1 capsule (40 mg total) by mouth daily as needed. 30 capsule 5    zolpidem (AMBIEN) 10 MG Oral Tab Take 1 tablet (10 mg total) by mouth nightly as needed for Sleep. 30 tablet 5    albuterol (PROAIR HFA) 108 (90 Base) MCG/ACT Inhalation Aero Soln Inhale 2 puffs into the lungs every 6 (six) hours as needed for Wheezing. 1 each 1    EPINEPHrine (EPIPEN 2-YANY) 0.3 MG/0.3ML Injection Solution Auto-injector Inject IM in event of  allergic reaction 1 each 0    rosuvastatin 5 MG Oral Tab Take 1 tablet (5 mg total) by mouth nightly. 30 tablet 3    Glucose Blood (ONETOUCH VERIO) In Vitro Strip To check sugars twice a day E 11.65 with insulin use 200 strip 0    Blood Glucose Monitoring Suppl (ONETOUCH VERIO FLEX SYSTEM) w/Device Does not apply Kit 1 each As Directed. To check sugars twice a day E 11.65 with insulin use 1 kit 0    OneTouch Delica Lancets 33G Does not apply Misc To check sugars twice a day E 11.65 with insulin use 200 each 0    Blood Glucose Monitoring Suppl Does not apply Kit Use as directed to check blood glucose twice a day - fasting and before  dinner 1 kit 0    Blood Glucose Monitoring Suppl Does not apply Device Use as directed to check blood glucose twice a day -fasting and before dinner 200 each 0    Microlet Lancets Does not apply Misc Use as directed to check blood glucose twice a day - fasting and before dinner 200 each 0    Omalizumab (XOLAIR) 150 MG/ML Subcutaneous Solution Prefilled Syringe Inject 300 mg into the skin every 28 days. (Patient not taking: Reported on 12/29/2023) 2 mL 2         Review of Systems:  Review of Systems   Constitutional: Negative.    Musculoskeletal:  Positive for arthralgias.        Shoulders and knees most mornings.          Objective:   BP (!) 146/94   Pulse 83   Ht 5' 7\" (1.702 m)   Wt 185 lb (83.9 kg)   SpO2 100%   BMI 28.98 kg/m²  Estimated body mass index is 28.98 kg/m² as calculated from the following:    Height as of this encounter: 5' 7\" (1.702 m).    Weight as of this encounter: 185 lb (83.9 kg).  Physical Exam  Vitals reviewed.   Musculoskeletal:      Right shoulder: Normal.      Left shoulder: Normal.      Right knee: Normal.      Left knee: Normal.      Comments: Mild impingement bilateral           Assessment & Plan:   1. Pain of both shoulder joints (Primary)  -     Vitamin D; Future; Expected date: 02/12/2024  -     C-Reactive Protein; Future; Expected date: 02/12/2024  2. Arthralgia of both knees    Recommend some labs.  Will agresssively treat any vitamin D deficiency.        No follow-ups on file.    Wayne Clark DO, 2/12/2024, 6:12 PM

## 2024-02-14 RX ORDER — ACETAMINOPHEN 500 MG
1000 TABLET ORAL EVERY 6 HOURS PRN
COMMUNITY

## 2024-02-14 RX ORDER — COVID-19 ANTIGEN TEST
220 KIT MISCELLANEOUS DAILY PRN
COMMUNITY

## 2024-02-14 NOTE — TELEPHONE ENCOUNTER
From: Paula Brooks  To: Wayne Clark  Sent: 2/13/2024 3:27 PM CST  Subject: Lab results    Just checking results from bloodwork..

## 2024-02-15 ENCOUNTER — TELEPHONE (OUTPATIENT)
Dept: FAMILY MEDICINE CLINIC | Facility: CLINIC | Age: 54
End: 2024-02-15

## 2024-02-15 NOTE — TELEPHONE ENCOUNTER
Patient would like a call back with her blood test results. Per the patient it was done on 2-12-24.

## 2024-02-16 NOTE — TELEPHONE ENCOUNTER
Dr. Clark - please see message below; patient requesting results from 2/12/24 labs, please advise

## 2024-02-17 NOTE — TELEPHONE ENCOUNTER
Your laboratory results are in an acceptable range.  No significant issues at this time.  The abnormalities seen are minor and not significant at this time. Recommend taking over the counter vitamin D3 at 2,000 units daily to boost your level.  Stay on this amount until summer.   Written by Wayne Clark DO on 2/16/2024  5:14 PM CST  Seen by patient Paula Brooks on 2/16/2024 11:24 PM

## 2024-02-20 RX ORDER — BLOOD SUGAR DIAGNOSTIC
STRIP MISCELLANEOUS
Qty: 200 STRIP | Refills: 0 | Status: SHIPPED | OUTPATIENT
Start: 2024-02-20

## 2024-02-21 ENCOUNTER — ANESTHESIA EVENT (OUTPATIENT)
Dept: SURGERY | Facility: HOSPITAL | Age: 54
End: 2024-02-21
Payer: COMMERCIAL

## 2024-02-22 ENCOUNTER — HOSPITAL ENCOUNTER (OUTPATIENT)
Dept: MAMMOGRAPHY | Facility: HOSPITAL | Age: 54
Discharge: HOME | End: 2024-02-22
Attending: SURGERY
Payer: COMMERCIAL

## 2024-02-22 ENCOUNTER — HOSPITAL ENCOUNTER (OUTPATIENT)
Facility: HOSPITAL | Age: 54
Setting detail: HOSPITAL OUTPATIENT SURGERY
Discharge: HOME | End: 2024-02-22
Attending: SURGERY | Admitting: SURGERY
Payer: COMMERCIAL

## 2024-02-22 ENCOUNTER — APPOINTMENT (OUTPATIENT)
Dept: MAMMOGRAPHY | Facility: HOSPITAL | Age: 54
End: 2024-02-22
Attending: SURGERY
Payer: COMMERCIAL

## 2024-02-22 ENCOUNTER — ANESTHESIA (OUTPATIENT)
Dept: SURGERY | Facility: HOSPITAL | Age: 54
End: 2024-02-22
Payer: COMMERCIAL

## 2024-02-22 VITALS
OXYGEN SATURATION: 97 % | DIASTOLIC BLOOD PRESSURE: 94 MMHG | BODY MASS INDEX: 29.19 KG/M2 | HEART RATE: 78 BPM | RESPIRATION RATE: 16 BRPM | TEMPERATURE: 97 F | SYSTOLIC BLOOD PRESSURE: 125 MMHG | HEIGHT: 67 IN | WEIGHT: 186 LBS

## 2024-02-22 DIAGNOSIS — R92.8 ABNORMAL MAMMOGRAM: ICD-10-CM

## 2024-02-22 DIAGNOSIS — R92.8 ABNORMAL MAMMOGRAM: Primary | ICD-10-CM

## 2024-02-22 LAB
GLUCOSE BLDC GLUCOMTR-MCNC: 89 MG/DL (ref 70–99)
GLUCOSE BLDC GLUCOMTR-MCNC: 89 MG/DL (ref 70–99)

## 2024-02-22 PROCEDURE — 82962 GLUCOSE BLOOD TEST: CPT

## 2024-02-22 PROCEDURE — 88307 TISSUE EXAM BY PATHOLOGIST: CPT | Performed by: SURGERY

## 2024-02-22 PROCEDURE — 76098 X-RAY EXAM SURGICAL SPECIMEN: CPT | Performed by: SURGERY

## 2024-02-22 PROCEDURE — 19281 PERQ DEVICE BREAST 1ST IMAG: CPT | Performed by: SURGERY

## 2024-02-22 PROCEDURE — 0HBU0ZX EXCISION OF LEFT BREAST, OPEN APPROACH, DIAGNOSTIC: ICD-10-PCS | Performed by: SURGERY

## 2024-02-22 PROCEDURE — 88300 SURGICAL PATH GROSS: CPT | Performed by: SURGERY

## 2024-02-22 RX ORDER — ACETAMINOPHEN 500 MG
1000 TABLET ORAL ONCE
Status: COMPLETED | OUTPATIENT
Start: 2024-02-22 | End: 2024-02-22

## 2024-02-22 RX ORDER — LIDOCAINE HYDROCHLORIDE AND EPINEPHRINE 10; 10 MG/ML; UG/ML
INJECTION, SOLUTION INFILTRATION; PERINEURAL AS NEEDED
Status: DISCONTINUED | OUTPATIENT
Start: 2024-02-22 | End: 2024-02-22 | Stop reason: HOSPADM

## 2024-02-22 RX ORDER — DEXTROSE MONOHYDRATE 25 G/50ML
50 INJECTION, SOLUTION INTRAVENOUS
OUTPATIENT
Start: 2024-02-22

## 2024-02-22 RX ORDER — SODIUM CHLORIDE, SODIUM LACTATE, POTASSIUM CHLORIDE, CALCIUM CHLORIDE 600; 310; 30; 20 MG/100ML; MG/100ML; MG/100ML; MG/100ML
INJECTION, SOLUTION INTRAVENOUS CONTINUOUS
OUTPATIENT
Start: 2024-02-22

## 2024-02-22 RX ORDER — INSULIN ASPART 100 [IU]/ML
INJECTION, SOLUTION INTRAVENOUS; SUBCUTANEOUS ONCE
OUTPATIENT
Start: 2024-02-22 | End: 2024-02-22

## 2024-02-22 RX ORDER — NALOXONE HYDROCHLORIDE 0.4 MG/ML
0.08 INJECTION, SOLUTION INTRAMUSCULAR; INTRAVENOUS; SUBCUTANEOUS AS NEEDED
OUTPATIENT
Start: 2024-02-22 | End: 2024-02-22

## 2024-02-22 RX ORDER — PROCHLORPERAZINE EDISYLATE 5 MG/ML
5 INJECTION INTRAMUSCULAR; INTRAVENOUS EVERY 8 HOURS PRN
OUTPATIENT
Start: 2024-02-22

## 2024-02-22 RX ORDER — NICOTINE POLACRILEX 4 MG
30 LOZENGE BUCCAL
OUTPATIENT
Start: 2024-02-22

## 2024-02-22 RX ORDER — HYDROMORPHONE HYDROCHLORIDE 1 MG/ML
0.2 INJECTION, SOLUTION INTRAMUSCULAR; INTRAVENOUS; SUBCUTANEOUS EVERY 5 MIN PRN
OUTPATIENT
Start: 2024-02-22

## 2024-02-22 RX ORDER — BUPIVACAINE HYDROCHLORIDE 5 MG/ML
INJECTION, SOLUTION EPIDURAL; INTRACAUDAL AS NEEDED
Status: DISCONTINUED | OUTPATIENT
Start: 2024-02-22 | End: 2024-02-22 | Stop reason: HOSPADM

## 2024-02-22 RX ORDER — HYDROMORPHONE HYDROCHLORIDE 1 MG/ML
0.4 INJECTION, SOLUTION INTRAMUSCULAR; INTRAVENOUS; SUBCUTANEOUS EVERY 5 MIN PRN
OUTPATIENT
Start: 2024-02-22

## 2024-02-22 RX ORDER — ONDANSETRON 2 MG/ML
4 INJECTION INTRAMUSCULAR; INTRAVENOUS EVERY 6 HOURS PRN
OUTPATIENT
Start: 2024-02-22

## 2024-02-22 RX ORDER — DEXAMETHASONE SODIUM PHOSPHATE 4 MG/ML
VIAL (ML) INJECTION AS NEEDED
Status: DISCONTINUED | OUTPATIENT
Start: 2024-02-22 | End: 2024-02-22 | Stop reason: SURG

## 2024-02-22 RX ORDER — SODIUM CHLORIDE, SODIUM LACTATE, POTASSIUM CHLORIDE, CALCIUM CHLORIDE 600; 310; 30; 20 MG/100ML; MG/100ML; MG/100ML; MG/100ML
INJECTION, SOLUTION INTRAVENOUS CONTINUOUS
Status: DISCONTINUED | OUTPATIENT
Start: 2024-02-22 | End: 2024-02-22

## 2024-02-22 RX ORDER — HYDROMORPHONE HYDROCHLORIDE 1 MG/ML
0.6 INJECTION, SOLUTION INTRAMUSCULAR; INTRAVENOUS; SUBCUTANEOUS EVERY 5 MIN PRN
OUTPATIENT
Start: 2024-02-22

## 2024-02-22 RX ORDER — LABETALOL HYDROCHLORIDE 5 MG/ML
5 INJECTION, SOLUTION INTRAVENOUS EVERY 5 MIN PRN
OUTPATIENT
Start: 2024-02-22 | End: 2024-02-22

## 2024-02-22 RX ORDER — MORPHINE SULFATE 2 MG/ML
2 INJECTION, SOLUTION INTRAMUSCULAR; INTRAVENOUS EVERY 10 MIN PRN
OUTPATIENT
Start: 2024-02-22

## 2024-02-22 RX ORDER — MORPHINE SULFATE 4 MG/ML
4 INJECTION, SOLUTION INTRAMUSCULAR; INTRAVENOUS EVERY 10 MIN PRN
OUTPATIENT
Start: 2024-02-22

## 2024-02-22 RX ORDER — ONDANSETRON 2 MG/ML
INJECTION INTRAMUSCULAR; INTRAVENOUS AS NEEDED
Status: DISCONTINUED | OUTPATIENT
Start: 2024-02-22 | End: 2024-02-22 | Stop reason: SURG

## 2024-02-22 RX ORDER — CEFAZOLIN SODIUM/WATER 2 G/20 ML
2 SYRINGE (ML) INTRAVENOUS ONCE
Status: COMPLETED | OUTPATIENT
Start: 2024-02-22 | End: 2024-02-22

## 2024-02-22 RX ORDER — MIDAZOLAM HYDROCHLORIDE 1 MG/ML
INJECTION INTRAMUSCULAR; INTRAVENOUS AS NEEDED
Status: DISCONTINUED | OUTPATIENT
Start: 2024-02-22 | End: 2024-02-22 | Stop reason: SURG

## 2024-02-22 RX ORDER — HYDROCODONE BITARTRATE AND ACETAMINOPHEN 5; 325 MG/1; MG/1
1-2 TABLET ORAL EVERY 6 HOURS PRN
Qty: 20 TABLET | Refills: 0 | Status: SHIPPED | OUTPATIENT
Start: 2024-02-22

## 2024-02-22 RX ORDER — NICOTINE POLACRILEX 4 MG
15 LOZENGE BUCCAL
OUTPATIENT
Start: 2024-02-22

## 2024-02-22 RX ORDER — MORPHINE SULFATE 10 MG/ML
6 INJECTION, SOLUTION INTRAMUSCULAR; INTRAVENOUS EVERY 10 MIN PRN
OUTPATIENT
Start: 2024-02-22

## 2024-02-22 RX ORDER — HYDROCODONE BITARTRATE AND ACETAMINOPHEN 5; 325 MG/1; MG/1
1 TABLET ORAL ONCE
Status: COMPLETED | OUTPATIENT
Start: 2024-02-22 | End: 2024-02-22

## 2024-02-22 RX ORDER — LIDOCAINE HYDROCHLORIDE 10 MG/ML
INJECTION, SOLUTION EPIDURAL; INFILTRATION; INTRACAUDAL; PERINEURAL AS NEEDED
Status: DISCONTINUED | OUTPATIENT
Start: 2024-02-22 | End: 2024-02-22 | Stop reason: SURG

## 2024-02-22 RX ADMIN — MIDAZOLAM HYDROCHLORIDE 2 MG: 1 INJECTION INTRAMUSCULAR; INTRAVENOUS at 10:46:00

## 2024-02-22 RX ADMIN — ONDANSETRON 4 MG: 2 INJECTION INTRAMUSCULAR; INTRAVENOUS at 10:46:00

## 2024-02-22 RX ADMIN — CEFAZOLIN SODIUM/WATER 2 G: 2 G/20 ML SYRINGE (ML) INTRAVENOUS at 10:49:00

## 2024-02-22 RX ADMIN — SODIUM CHLORIDE, SODIUM LACTATE, POTASSIUM CHLORIDE, CALCIUM CHLORIDE: 600; 310; 30; 20 INJECTION, SOLUTION INTRAVENOUS at 10:42:00

## 2024-02-22 RX ADMIN — SODIUM CHLORIDE, SODIUM LACTATE, POTASSIUM CHLORIDE, CALCIUM CHLORIDE: 600; 310; 30; 20 INJECTION, SOLUTION INTRAVENOUS at 11:20:00

## 2024-02-22 RX ADMIN — LIDOCAINE HYDROCHLORIDE 50 MG: 10 INJECTION, SOLUTION EPIDURAL; INFILTRATION; INTRACAUDAL; PERINEURAL at 10:46:00

## 2024-02-22 RX ADMIN — DEXAMETHASONE SODIUM PHOSPHATE 4 MG: 4 MG/ML VIAL (ML) INJECTION at 10:46:00

## 2024-02-22 NOTE — ANESTHESIA PREPROCEDURE EVALUATION
Anesthesia PreOp Note    HPI:     Paula Brooks is a 53 year old female who presents for preoperative consultation requested by: Sahra Dunn MD    Date of Surgery: 2/22/2024    Procedure(s):  Left breast wire localized excisional biopsy  Indication: Abnormal mammogram [R92.8]    Relevant Problems   No relevant active problems       NPO:  Last Liquid Consumption Date: 02/21/24  Last Liquid Consumption Time: 2000  Last Solid Consumption Date: 02/21/24  Last Solid Consumption Time: 1900  Last Liquid Consumption Date: 02/21/24          History Review:  Patient Active Problem List    Diagnosis Date Noted    Pain of both shoulder joints 02/12/2024    Arthralgia of both knees 02/12/2024    Abnormal mammogram 02/01/2024    Type 2 diabetes mellitus with hyperglycemia (HCC) 05/19/2022    Observation for suspected condition     Menorrhagia with irregular cycle 06/19/2018    Impaired fasting glucose 05/14/2018    Hypercholesteremia 05/14/2018       Past Medical History:   Diagnosis Date    Allergy     allergies    Amenorrhea 04/26/2016    Asthma (HCC)     Decorative tattoo     Diabetes (HCC)     High blood pressure     High cholesterol     Hypercholesteremia 05/14/2018    Unspecified disorder of neck     overactive gland in neck, surgical removal 1991       Past Surgical History:   Procedure Laterality Date    HYSTEROSCOPY, STERILIZATION  2017    Essure    INCISIONAL BIOPSY SKIN SINGLE LESION Left 09/23/2020    left shoulder lipoma    LAXMI BIOPSY STEREO NODULE 1 SITE LEFT (CPT=19081)  12/08/2023    for focal asymetry; X clip    NECK/CHEST PROCEDURE UNLISTED  1991    surgical removal (overactive gland in neck)    NEEDLE BIOPSY LEFT Left 12/20/2023    US CNB    WRIST ARTHROSCOP,RELEASE XVERS LIG Right 12/10/2021    Right endoscopic carpal tunnel release       Medications Prior to Admission   Medication Sig Dispense Refill Last Dose    Naproxen Sodium (ALEVE) 220 MG Oral Cap Take 220 mg by mouth daily as needed.        acetaminophen 500 MG Oral Tab Take 2 tablets (1,000 mg total) by mouth every 6 (six) hours as needed for Pain.       Tirzepatide (MOUNJARO) 5 MG/0.5ML Subcutaneous Solution Pen-injector Inject 5 mg into the skin once a week. 2 mL 0 2024    [] montelukast (SINGULAIR) 10 MG Oral Tab Take 1 tablet (10 mg total) by mouth once as needed. 30 tablet 5     valsartan 160 MG Oral Tab Take 1 tablet (160 mg total) by mouth daily. 30 tablet 11 2024    Omeprazole 40 MG Oral Capsule Delayed Release Take 1 capsule (40 mg total) by mouth daily as needed. 30 capsule 5 2024    zolpidem (AMBIEN) 10 MG Oral Tab Take 1 tablet (10 mg total) by mouth nightly as needed for Sleep. 30 tablet 5 2024    albuterol (PROAIR HFA) 108 (90 Base) MCG/ACT Inhalation Aero Soln Inhale 2 puffs into the lungs every 6 (six) hours as needed for Wheezing. 1 each 1     rosuvastatin 5 MG Oral Tab Take 1 tablet (5 mg total) by mouth nightly. 30 tablet 3 2024    Glucose Blood (ONETOUCH VERIO) In Vitro Strip CHECK SUGARS TWICE DAILY 200 strip 0     EPINEPHrine (EPIPEN 2-YANY) 0.3 MG/0.3ML Injection Solution Auto-injector Inject IM in event of  allergic reaction 1 each 0 More than a month    Omalizumab (XOLAIR) 150 MG/ML Subcutaneous Solution Prefilled Syringe Inject 300 mg into the skin every 28 days. (Patient not taking: Reported on 2023) 2 mL 2     Blood Glucose Monitoring Suppl (ONETOUCH VERIO FLEX SYSTEM) w/Device Does not apply Kit 1 each As Directed. To check sugars twice a day E 11.65 with insulin use 1 kit 0     OneTouch Delica Lancets 33G Does not apply Misc To check sugars twice a day E 11.65 with insulin use 200 each 0     Blood Glucose Monitoring Suppl Does not apply Kit Use as directed to check blood glucose twice a day - fasting and before dinner 1 kit 0     Blood Glucose Monitoring Suppl Does not apply Device Use as directed to check blood glucose twice a day -fasting and before dinner 200 each 0     Microlet  Lancets Does not apply Misc Use as directed to check blood glucose twice a day - fasting and before dinner 200 each 0      Current Facility-Administered Medications Ordered in Epic   Medication Dose Route Frequency Provider Last Rate Last Admin    lactated ringers infusion   Intravenous Continuous Sahra Dunn MD 20 mL/hr at 02/22/24 0804 New Bag at 02/22/24 0804    ceFAZolin (Ancef) 2 g in 20mL IV syringe premix  2 g Intravenous Once Sahra Dunn MD         No current [facility]-ordered outpatient medications on file.       Allergies   Allergen Reactions    Iodine (Topical) ANAPHYLAXIS, HIVES and UNKNOWN     Eyes swell, welts on body    Shellfish HIVES       Family History   Problem Relation Age of Onset    Diabetes Mother     Cancer Mother         lung CA    Heart Disease Father     Allergies Son     No Known Problems Sister     No Known Problems Brother     No Known Problems Brother     No Known Problems Brother      Social History     Socioeconomic History    Marital status:    Tobacco Use    Smoking status: Never    Smokeless tobacco: Never   Vaping Use    Vaping Use: Never used   Substance and Sexual Activity    Alcohol use: Yes     Comment: occasionally    Drug use: No    Sexual activity: Yes     Partners: Male     Birth control/protection: Implant     Comment: ESSURE   Other Topics Concern    Caffeine Concern Yes     Comment: coffee, 1/2 cup daily    Right Handed Yes       Available pre-op labs reviewed.  Lab Results   Component Value Date    WBC 5.6 02/02/2024    RBC 4.25 02/02/2024    HGB 12.7 02/02/2024    HCT 40.5 02/02/2024    MCV 95.3 02/02/2024    MCH 29.9 02/02/2024    MCHC 31.4 02/02/2024    RDW 12.6 02/02/2024    .0 02/02/2024     Lab Results   Component Value Date     02/02/2024    K 3.6 02/02/2024     02/02/2024    CO2 28.0 02/02/2024    BUN 14 02/02/2024    CREATSERUM 0.83 02/02/2024     (H) 02/02/2024    PGLU 89 02/22/2024    CA 9.2 02/02/2024           Vital Signs:  Body mass index is 29.13 kg/m².   height is 1.702 m (5' 7\") and weight is 84.4 kg (186 lb). Her oral temperature is 98.1 °F (36.7 °C). Her blood pressure is 152/99 (abnormal) and her pulse is 86. Her respiration is 16 and oxygen saturation is 99%.   Vitals:    02/15/24 1235 02/22/24 0738   BP:  (!) 152/99   Pulse:  86   Resp:  16   Temp:  98.1 °F (36.7 °C)   TempSrc:  Oral   SpO2:  99%   Weight: 84.4 kg (186 lb)    Height: 1.702 m (5' 7\")         Anesthesia Evaluation     Patient summary reviewed and Nursing notes reviewed    No history of anesthetic complications   Airway   Mallampati: II  TM distance: >3 FB  Neck ROM: full  Dental - Dentition appears grossly intact     Pulmonary - normal exam   (+) asthma  Cardiovascular - normal exam  (+) hypertension well controlled    ROS comment: hyperlipidemia    Neuro/Psych - negative ROS     GI/Hepatic/Renal - negative ROS     Endo/Other    (+) diabetes mellitus type 2 well controlled  Abdominal  - normal exam                 Anesthesia Plan:   ASA:  3  Plan:   MAC  Post-op Pain Management: Oral pain medication and IV analgesics  Informed Consent Plan and Risks Discussed With:  Patient      I have informed Paula Brooks of the nature of the anesthetic plan, benefits, risks including possible dental damage if relevant, major complications, and any alternative forms of anesthetic management.   All of the patient's questions were answered to the best of my ability. The patient desires the anesthetic management as planned.  JENNA MILLS MD  2/22/2024 8:23 AM  Present on Admission:  **None**

## 2024-02-22 NOTE — DISCHARGE INSTRUCTIONS
Breast Surgery  Post-operative Instructions  Excisional Biopsy, Lumpectomy, Mastectomy, [place] Node Biopsy, or Axillary  Lymph Node Dissection  Sahra Dunn MD  General Instructions  The following instructions will provide helpful information that will assist your recovery. These are designed to be general guidelines. Please remember that everyone heals and recovers differently. Listen to your body and rest when you are tired. If you have any questions or concerns, please do not hesitate to contact my office. I would like to see you in the office about one week after surgery, please schedule and appointment through my office to make a post-operative appointment if you do not already have one.     Restrictions  There are no lifting weight restrictions for the arm on the surgical side. You may gradually increase the amount of weight based on your comfort level. You should avoid a lot of repetitious activity with the arm until the drain is out (if one was placed) and the wound is well-healed (about two weeks).   You should not drive a car until you believe you can react to an emergency situation and you’re no longer taking narcotic pain medications.   You may shower the day after surgery. You should not bathe or swim (i.e. submerge wound) until the wound is well healed (about two weeks).  There are no dietary restrictions.    Exercise  You may begin arm exercises within a couple days. Do these 2 or 3 times per day, beginning with light exercise and gradually increase your range of motion and repetitions. This will help your arm regain full mobility. We will address your activity level again at your post-operative visit.   You will have pain medication prescribed before discharge. Take this as directed to relieve pain. It is important that you be comfortable so that you may continue your stretching exercises.   If you find the medication prescribed is too strong, try Tylenol (Acetaminophen) or  Ibuprofen.    Wound Care  You may remove the gauze dressing on the first or second postoperative day and then shower. You should leave the steri-strips in place; they will start to peel off about 10 days after your surgery. The stitches are all underneath the skin and will dissolve on their own. You will not need any stitches removed except if you have a drain in place.  I encourage you to shower once the outer bandage is removed, you may use soap and water directly over the steri-strips and pat dry following.  You should keep gauze dressing on the wound until the wound is completely dry and without drainage-usually 1-3 days.   If a surgical bra was placed after the surgery, I encourage you to wear it as much as possible during the week following the procedure (including during sleep). Alternatively, you may choose to wear your own bra provided it is comfortable, provides support and does not have an underwire. If the breast doesn’t move it is less painful.  If an elastic bandage was placed around your chest after the surgery you may remove it on the 1st or 2nd day after surgery. If you prefer to leave it on longer, you may.  It is normal to feel a lump in the area of the incisions for up to 6 months. This is part of the healing process. Eventually the breast will return to its normal condition.   Pain Medication  You will be given a prescription for a narcotic pain medication (usually Norco) upon discharge. Many patients have very little pain and don’t want to use the narcotic. Don’t be afraid to use it if you’re uncomfortable. If you’d prefer you may substitute Tylenol or Ibuprofen (Motrin, Advil). Using an ice pack for a few minutes over the incision can also alleviate pain. If you do use the narcotic medication, use an over the counter stool softener or gentle laxative and stay well-hydrated as constipation is not uncommon with narcotics.    Pathology Report  The Pathology report is usually available 4-5  business days following the surgery. I will call you  with the results once the report is available.    Notify my office if:   Your temperature is over 101.5 F   You notice increasing swelling, redness, warmth or drainage from around the incision or drain site.    If you experience any problems please call my office and either my nurse or myself will respond. After hours, you will be forwarded to my answering service which will help you get in touch with myself or the physician covering for me.

## 2024-02-22 NOTE — PROCEDURES
[facility]  Procedure Note    Paula Brooks Patient Status:  Outpatient    1970 MRN [id number]   Location [facility] MAMMOGRAPHY Attending Sahra Dunn MD    Day # 0 PCP Wayne Clark DO     Procedure: left breast localization    Pre-Procedure Diagnosis:  distortion/discordant    Post-Procedure Diagnosis: same    Anesthesia:  Local    Findings:  vision clip    Specimens: 0    Blood Loss:  0    Tourniquet Time: 0  Complications:  None  Drains:  0    Secondary Diagnosis:  0    Marry Rowe MD  2024

## 2024-02-22 NOTE — IMAGING NOTE
0901 Pt  to mammography department scouts completed by  RD mammography technologist     0902 Hx taken procedure explained questions answered.  Iv patent no redness or swelling noted at site     0902  Consent signed and verified      0907  Dr AILEEN kim scanning completed Order verified and signed by all  procedural staff members    0908 Time out taken       site marked LEFT  Breast    70597 Chloro prep as skin prep to site.  Lidocaine   1% 10 milligrams per ml given as anesthetic affect from kit  3 ml total given.    0914  Ghiatas needle  20 gauge  X 5 cm   placed .  Pt re  images procedure complete.    0923  BB marker to site wire secured to breast  strips.  A 4x4 dsg secured  over wire with tape by nurse  TOD PARK  after images completed .    0933To [facility] SURGERY department . after images completed.

## 2024-02-22 NOTE — H&P
History of Present Illness:   Ms. Paula Brooks is a 53 year old woman who presents with an abnormal mammogram.  The patient denies any palpable masses, nipple discharge, skin changes or axillary symptoms.  She has no known family history of breast cancer.  She has no personal prior history of breast disease or biopsies.  She presented for screening mammogram on 2023 and was found to have asymmetry with distortion in the left breast as well as calcifications and bilateral axillary adenopathy.  She was recalled for further imaging which took place on 2023 and was found to have ductal ectasia of the left breast with a persistent asymmetry of the left breast for which biopsy was recommended and calcifications thought 2023 and was found to have fragments of benign breast tissue.  This was thought to be discordant and surgical excision has been recommended.  She is here today for evaluation and recommendations for further therapy.             Past Medical History:   Diagnosis Date    Allergy       allergies    Amenorrhea 2016    Asthma      Decorative tattoo      Diabetes (HCC)      High blood pressure      Hypercholesteremia 2018    Unspecified disorder of neck       overactive gland in neck, surgical removal                Past Surgical History:   Procedure Laterality Date    HYSTEROSCOPY, STERILIZATION   2017     Essure    INCISIONAL BIOPSY SKIN SINGLE LESION Left 2020     left shoulder lipoma    LAXMI BIOPSY STEREO NODULE 1 SITE LEFT (CPT=19081)   2023     for focal asymetry; X clip    NECK/CHEST PROCEDURE UNLISTED        surgical removal (overactive gland in neck)    NEEDLE BIOPSY LEFT Left 2023     US CNB    WRIST ARTHROSCOP,RELEASE XVERS LIG Right 12/10/2021     Right endoscopic carpal tunnel release         Gynecological History:  Pt is a   Pt was 20 years old at time of first pregnancy.    She denies any cumulative breastfeeding  history  She achieved menarche at age 15 and LMP age 51  Age of Menopause: 51  Type: natural menopause  She denies any history of hormone replacement therapy   She has history of oral contraceptive use, used in the past  She denies infertility treatment to achieve pregnancy.     Medications:    No outpatient medications have been marked as taking for the 1/31/24 encounter (Appointment) with Sahra Dunn MD.         Allergies:          Allergies   Allergen Reactions    Iodine (Topical) ANAPHYLAXIS, HIVES and UNKNOWN       Eyes swell, welts on body    Shellfish HIVES         Family History:         Family History   Problem Relation Age of Onset    Diabetes Mother      Cancer Mother           lung CA    Heart Disease Father      Allergies Son      No Known Problems Sister      No Known Problems Brother      No Known Problems Brother      No Known Problems Brother           She is not of Ashkenazi Mu-ism ancestry.     Social History:       History   Alcohol Use    Yes       Comment: occasionally             History   Smoking Status    Never   Smokeless Tobacco    Never         Review of Systems:  General:   The patient denies, fever, chills, night sweats, fatigue, generalized weakness, change in appetite or weight loss.     HEENT:     The patient denies eye irritation, cataracts, redness, glaucoma, yellowing of the eyes, change in vision, color blindness, or +wearing contacts/glasses. The patient denies hearing loss, ringing in the ears, ear drainage, earaches, nasal congestion, nose bleeds, snoring, pain in mouth/throat, hoarseness, change in voice, facial trauma.     Respiratory:  The patient denies chronic cough, phlegm, hemoptysis, pleurisy/chest pain, pneumonia, asthma, wheezing, difficulty in breathing with exertion, emphysema, chronic bronchitis, shortness of breath or abnormal sound when breathing.      Cardiovascular:  There is no history of chest pain, chest pressure/discomfort, palpitations, irregular  heartbeat, fainting or near-fainting, difficulty breathing when lying flat, SOB/Coughing at night, swelling of the legs or chest pain while walking.     Breasts:  See history of present illness     Gastrointestinal:     There is no history of difficulty or pain with swallowing, reflux symptoms, vomiting, dark or bloody stools, constipation, yellowing of the skin, indigestion, nausea, change in bowel habits, diarrhea, abdominal pain or vomiting blood.      Genitourinary:  The patient denies frequent urination, needing to get up at night to urinate, urinary hesitancy or retaining urine, painful urination, urinary incontinence, decreased urine stream, blood in the urine or vaginal/penile discharge.     Skin:    The patient denies rash, itching, skin lesions, dry skin, change in skin color or change in moles.      Hematologic/Lymphatic:  The patient denies easily bruising or bleeding or persistent swollen glands or lymph nodes.      Musculoskeletal:  The patient denies muscle aches/pain, joint pain, stiff joints, neck pain, back pain or bone pain.     Neuropsychiatric:  There is no history of migraines or severe headaches, seizure/epilepsy, speech problems, coordination problems, trembling/tremors, fainting/black outs, dizziness, memory problems, loss of sensation/numbness, problems walking, weakness, tingling or burning in hands/feet. There is no history of abusive relationship, bipolar disorder, sleep disturbance, anxiety, depression or feeling of despair.     Endocrine:    There is no history of poor/slow wound healing, weight loss/gain, fertility or hormone problems, cold intolerance, thyroid disease.      Allergic/Immunologic:  There is no history of hives, hay fever, angioedema or anaphylaxis.     /90 (BP Location: Left arm, Patient Position: Sitting, Cuff Size: adult)   Pulse 95   Temp 97.8 °F (36.6 °C) (Temporal)   Resp 19   Ht 1.702 m (5' 7\")   Wt 83.9 kg (185 lb)   SpO2 100%   BMI 28.98 kg/m²       Physical Exam:  The patient is an alert, oriented, well-nourished and  well-developed woman who appears her stated age. Her speech patterns and movements are normal. Her affect is appropriate.     HEENT: The head is normocephalic. The neck is supple. The thyroid is not enlarged and is without palpable masses/nodules. There are no palpable masses. The trachea is in the midline. Conjunctiva are clear, non-icteric.     Chest: The chest expands symmetrically. The lungs are clear to auscultation.     Heart: The rhythm is regular.  There are no murmurs, rubs, gallops or thrills.     Breasts:  Her breasts are symmetrical with a cup size 38DD.  Right breast: The skin, nipple ,and areola appear normal. There is no skin dimpling with movement of the pectoralis. There is no nipple retraction. No nipple discharge can be elicited. The parenchyma is mildly nodular. There are no dominant masses in the breast. The axillary tail is normal.  Left breast:   The skin, nipple, and areola appear normal. There is no skin dimpling with movement of the pectoralis. There is no nipple retraction. No nipple discharge can be elicited. The parenchyma is mildly nodular. There are no dominant masses in the breast. The axillary tail is normal.     Abdomen:  The abdomen is soft, flat and non tender. The liver is not enlarged. There are no palpable masses.     Lymph Nodes:  The supraclavicular, axillary and cervical regions are free of significant lymphadenopathy.     Back: There is no vertebral column tenderness.     Skin: The skin appears normal. There are no suspicious appearing rashes or lesions.     Extremities: The extremities are without deformity, cyanosis or edema.     Impression:   Ms. Paula Brooks is a 53 year old woman presents with abnormal mammogram of left breast.     Discussion and Plan:  I had a discussion with the Patient regarding her breast exam. On exam today I found her to be healing well since the biopsy with no other  clinical findings.  I personally reviewed the recent imaging we discussed this as well as her pathology at length.     The finding of the distortion cannot be explained by the benign pathological results and therefore this is considered discordant.  For this reason I agree with a left breast wire localized excision of this area to exclude any concerning pathological disease in this location. The risks and possible complications of the procedure were explained to the patient and her family and she understood and agreed to the proposed plan. She was given ample opportunity for questions and those questions were answered to her satisfaction. She has been  encouraged to contact the office with any questions or concerns prior to her next appointment.     Pre-op Diagnosis: Abnormal mammogram [R92.8]    The above referenced H&P was reviewed by Sahra Dunn MD on 2/22/2024, the patient was examined and no significant changes have occurred in the patient's condition since the H&P was performed.  I discussed with the patient and/or legal representative the potential benefits, risks and side effects of this procedure; the likelihood of the patient achieving goals; and potential problems that might occur during recuperation.  I discussed reasonable alternatives to the procedure, including risks, benefits and side effects related to the alternatives and risks related to not receiving this procedure.  We will proceed with procedure as planned.

## 2024-02-22 NOTE — BRIEF OP NOTE
Pre-Operative Diagnosis: Abnormal mammogram [R92.8]     Post-Operative Diagnosis: Abnormal mammogram [R92.8]      Procedure Performed:   Left breast wire localized excisional biopsy    Surgeon(s) and Role:     * Sahra Dunn MD - Primary    Assistant(s):  Surgical Assistant.: Poonam Dejesus     Surgical Findings: Clip visualized on specimen radiogram     Specimen: Left breast lumpectomy     Estimated Blood Loss: Blood Output: 5 mL (2/22/2024 11:09 AM)      Sahra Dunn MD  2/22/2024  11:20 AM

## 2024-02-22 NOTE — ANESTHESIA POSTPROCEDURE EVALUATION
Patient: Paula Brooks    Procedure Summary       Date: 02/22/24 Room / Location: [facility] MAIN OR 08 / [facility] MAIN OR    Anesthesia Start: 1042 Anesthesia Stop: 1129    Procedure: Left breast wire localized excisional biopsy (Left: Breast) Diagnosis:       Abnormal mammogram      (Abnormal mammogram [R92.8])    Surgeons: Sahra Dunn MD Anesthesiologist: Jenna Aguilar MD    Anesthesia Type: MAC ASA Status: 3            Anesthesia Type: MAC    Vitals Value Taken Time   /93 02/22/24 1127   Temp 97.6 °F (36.4 °C) 02/22/24 1127   Pulse 90 02/22/24 1128   Resp 9 02/22/24 1128   SpO2 99 % 02/22/24 1128   Vitals shown include unfiled device data.    [facility] AN Post Evaluation:   Patient Evaluated in PACU  Patient Participation: complete - patient participated  Level of Consciousness: awake  Pain Score: 0  Pain Management: adequate  Airway Patency:patent  Dental exam unchanged from preop  Yes    Nausea/Vomiting: none  Cardiovascular Status: acceptable  Respiratory Status: acceptable  Postoperative Hydration acceptable      JENNA AGUILAR MD  2/22/2024 11:29 AM

## 2024-02-22 NOTE — OPERATIVE REPORT
[facility]    PATIENT'S NAME: ADRIANA TALBERT   ATTENDING PHYSICIAN: aShra Dunn MD   OPERATING PHYSICIAN: Sahra Dunn MD   PATIENT ACCOUNT#:   [id number]    LOCATION:  [facility] 10 [facility] 10  MEDICAL RECORD #:   [id number]       YOB: 1970  ADMISSION DATE:       02/22/2024      OPERATION DATE:  02/22/2024    OPERATIVE REPORT      PREOPERATIVE DIAGNOSIS:  Abnormal mammogram of the left breast with ductal dilation seen on imaging.  POSTOPERATIVE DIAGNOSIS:  Abnormal mammogram of the left breast with ductal dilation seen on imaging.  PROCEDURE:  Left breast wire-localized lumpectomy with left breast specimen radiography.    ANESTHESIA:  Monitored anesthesia care and local.    ESTIMATED BLOOD LOSS:  5 mL.    DRAINS:  None.    COMPLICATIONS:  None.    DISPOSITION:  Stable on transfer to recovery room.    ASSISTANT:  Poonam Dejesus CSA     INDICATIONS:  The patient is a 53-year-old female.  She had presented with an abnormal mammogram and was found to have ductal dilation and asymmetry and underwent biopsy that was benign, thought to be discordant with the distortion as well as the dilated duct in formal surgical excision has been recommended to exclude malignancy and possible other concerning pathological findings in this location.  Risks and possible complications were, therefore, discussed with the patient including, but not limited to, infection, bleeding, injury to surrounding structures, and possible need for reoperation.  The patient agreed to the proposed surgery.    OPERATIVE TECHNIQUE:  Patient was brought to the imaging suite.  She underwent a wire localization of the area of concern in the left breast and was then brought to the OR, placed in supine position, properly padded and secured, given a dose of IV antibiotics, and sequential compression devices were applied to the legs for DVT prophylaxis.  Monitored anesthesia care was induced.  The left breast was prepped and  draped in usual sterile fashion.  Lidocaine 1% with epinephrine was used to infiltrate the skin and subcutaneous tissues of the targeted incision site.  A curvilinear incision was made near the medial areolar border with a 15-blade knife in the skin.  The wire was identified and brought in the field.  Using sharp dissection and electrocautery, a segment of breast tissue surrounding the tip of the wire was carefully excised.  This was oriented with a short stitch and single clip superiorly, long stitch and double clip laterally, in order to allow for appropriate pathological margin assessment and review.  It was then placed in the imaging device where specimen x-ray confirmed the presence of targeted biopsy clip with adequate margins as deemed by myself, and a clip was then placed back within the cavity to assist with subsequent surveillance.  The wound was irrigated.  Hemostasis was assured with electrocautery.  Deep tissue was reapproximated with a running 3-0 PDS suture.  Her wound was then closed with interrupted 3-0 Vicryl for deep layer and running 4-0 subcuticular Monocryl for skin.  Mastisol and Steri-Strips were applied, and 0.5% Marcaine was instilled in the cavity to assist with postoperative analgesia.  A sterile dressing and compression bra were placed.  Blood loss was minimal.  All counts were correct at the conclusion of the procedure.  She tolerated the procedure well.  She was transferred to the recovery area in stable condition.     Dictated By Sahra Dunn MD  d: 02/22/2024 11:30:27  t: 02/22/2024 14:42:18  [facility] [id number]  CMG/    cc: MD Wayne Sky DO

## 2024-02-26 ENCOUNTER — PATIENT MESSAGE (OUTPATIENT)
Dept: FAMILY MEDICINE CLINIC | Facility: CLINIC | Age: 54
End: 2024-02-26

## 2024-02-26 DIAGNOSIS — M25.561 ARTHRALGIA OF BOTH KNEES: ICD-10-CM

## 2024-02-26 DIAGNOSIS — M25.512 PAIN OF BOTH SHOULDER JOINTS: Primary | ICD-10-CM

## 2024-02-26 DIAGNOSIS — M25.562 ARTHRALGIA OF BOTH KNEES: ICD-10-CM

## 2024-02-26 DIAGNOSIS — M25.511 PAIN OF BOTH SHOULDER JOINTS: Primary | ICD-10-CM

## 2024-02-28 ENCOUNTER — OFFICE VISIT (OUTPATIENT)
Dept: SURGERY | Facility: CLINIC | Age: 54
End: 2024-02-28
Payer: COMMERCIAL

## 2024-02-28 VITALS
DIASTOLIC BLOOD PRESSURE: 90 MMHG | OXYGEN SATURATION: 95 % | WEIGHT: 184.81 LBS | BODY MASS INDEX: 29.01 KG/M2 | RESPIRATION RATE: 18 BRPM | SYSTOLIC BLOOD PRESSURE: 160 MMHG | HEART RATE: 72 BPM | HEIGHT: 67 IN | TEMPERATURE: 98 F

## 2024-02-28 DIAGNOSIS — D24.2 INTRADUCTAL PAPILLOMA OF LEFT BREAST: Primary | ICD-10-CM

## 2024-02-28 PROCEDURE — 99024 POSTOP FOLLOW-UP VISIT: CPT | Performed by: SURGERY

## 2024-02-28 PROCEDURE — 3077F SYST BP >= 140 MM HG: CPT | Performed by: SURGERY

## 2024-02-28 PROCEDURE — 3008F BODY MASS INDEX DOCD: CPT | Performed by: SURGERY

## 2024-02-28 PROCEDURE — 3080F DIAST BP >= 90 MM HG: CPT | Performed by: SURGERY

## 2024-02-28 NOTE — TELEPHONE ENCOUNTER
[employer] message sent for clarification ==per medication record, Dr Dunn prescribed the hydrocodone 5 -325 mg .    2/12/24 office visit note;     Assessment & Plan:   1. Pain of both shoulder joints (Primary)  -     Vitamin D; Future; Expected date: 02/12/2024  -     C-Reactive Protein; Future; Expected date: 02/12/2024  2. Arthralgia of both knees     Recommend some labs.  Will agresssively treat any vitamin D deficiency.         LABS 2/12/24;    Your laboratory results are in an acceptable range.  No significant issues at this time.  The abnormalities seen are minor and not significant at this time. Recommend taking over the counter vitamin D3 at 2,000 units daily to boost your level.  Stay on this amount until summer.   Written by Wayne Clark,  on 2/16/2024  5:14 PM CST  Seen by patient Paula Brooks on 2/26/2024  4:07 PM    Future Appointments   Date Time Provider Department Center   2/28/2024  3:00 PM Sahra Dunn MD [facility]   4/9/2024  9:15 AM Marylin Harrington APRN [facility]           From: Paula Todd  To: Wayne Clark  Sent: 2/26/2024  4:12 PM CST  Subject: stiffness and muscle aches     Hi just following up on the joint and muscle stiffness, I had my surgery on Thursday and the results came back benign.  I am still having problems with the muscle aches and joint pains.  I did start on the vitamin D3 last week.

## 2024-02-28 NOTE — PROGRESS NOTES
Breast Surgery Post-Operative Visit    Diagnosis: Intraductal papilloma, left breast, s/p excisional biopsy on 2/22/2024    Stage: N/A    Disease Status:  Surgical treatment complete, no further treatment pending.      History of Present Illness:   Ms. Paula Brooks is a 53 year old woman who presents with an abnormal mammogram.  The patient denies any palpable masses, nipple discharge, skin changes or axillary symptoms.  She has no known family history of breast cancer.  She has no personal prior history of breast disease or biopsies.  She presented for screening mammogram on November 27, 2023 and was found to have asymmetry with distortion in the left breast as well as calcifications and bilateral axillary adenopathy.  She was recalled for further imaging which took place on December 4, 2023 and was found to have ductal ectasia of the left breast with a persistent asymmetry of the left breast for which biopsy was recommended and calcifications thought December 20, 2023 and was found to have fragments of benign breast tissue.  This was thought to be discordant and surgical excision has been recommended. She underwent excisional biopsy, which occurred without complication. She is here for postoperative visit. She reports her pain is under control. She denies erythema, warmth, drainage, or fevers. She is here today for evaluation and recommendations for further therapy.        Past Medical History:   Diagnosis Date    Allergy     allergies    Amenorrhea 04/26/2016    Asthma (HCC)     Decorative tattoo     Diabetes (HCC)     High blood pressure     High cholesterol     Hypercholesteremia 05/14/2018    Unspecified disorder of neck     overactive gland in neck, surgical removal 1991       Past Surgical History:   Procedure Laterality Date    HYSTEROSCOPY, STERILIZATION  2017    Essure    INCISIONAL BIOPSY SKIN SINGLE LESION Left 09/23/2020    left shoulder lipoma    LAXMI BIOPSY STEREO NODULE 1 SITE LEFT (CPT=19081)   2023    for focal asymetry; X clip    LAXMI LOCALIZATION WIRE 1 SITE LEFT (CPT=19281) Left 2024    vision clip    NECK/CHEST PROCEDURE UNLISTED      surgical removal (overactive gland in neck)    NEEDLE BIOPSY LEFT Left 2023    US CNB    WRIST ARTHROSCOP,RELEASE XVERS LIG Right 12/10/2021    Right endoscopic carpal tunnel release       Gynecological History:  Pt is a   Pt was 20 years old at time of first pregnancy.    She denies any cumulative breastfeeding history  She achieved menarche at age 15 and LMP age 51  Age of Menopause: 51  Type: natural menopause  She denies any history of hormone replacement therapy   She has history of oral contraceptive use, used in the past  She denies infertility treatment to achieve pregnancy.    Medications:    No outpatient medications have been marked as taking for the 24 encounter (Office Visit) with Sahra Dunn MD.       Allergies:    Allergies   Allergen Reactions    Iodine (Topical) ANAPHYLAXIS, HIVES and UNKNOWN     Eyes swell, welts on body    Shellfish HIVES       Family History:   Family History   Problem Relation Age of Onset    Diabetes Mother     Cancer Mother         lung CA    Heart Disease Father     Allergies Son     No Known Problems Sister     No Known Problems Brother     No Known Problems Brother     No Known Problems Brother        She is not of Ashkenazi Rastafari ancestry.    Social History:  History   Alcohol Use    Yes     Comment: occasionally       History   Smoking Status    Never   Smokeless Tobacco    Never       Review of Systems:  General:   The patient denies, fever, chills, night sweats, fatigue, generalized weakness, change in appetite or weight loss.    HEENT:     The patient denies eye irritation, cataracts, redness, glaucoma, yellowing of the eyes, change in vision, color blindness, or +wearing contacts/glasses. The patient denies hearing loss, ringing in the ears, ear drainage, earaches, nasal congestion,  nose bleeds, snoring, pain in mouth/throat, hoarseness, change in voice, facial trauma.    Respiratory:  The patient denies chronic cough, phlegm, hemoptysis, pleurisy/chest pain, pneumonia, asthma, wheezing, difficulty in breathing with exertion, emphysema, chronic bronchitis, shortness of breath or abnormal sound when breathing.     Cardiovascular:  There is no history of chest pain, chest pressure/discomfort, palpitations, irregular heartbeat, fainting or near-fainting, difficulty breathing when lying flat, SOB/Coughing at night, swelling of the legs or chest pain while walking.    Breasts:  See history of present illness    Gastrointestinal:     There is no history of difficulty or pain with swallowing, reflux symptoms, vomiting, dark or bloody stools, constipation, yellowing of the skin, indigestion, nausea, change in bowel habits, diarrhea, abdominal pain or vomiting blood.     Genitourinary:  The patient denies frequent urination, needing to get up at night to urinate, urinary hesitancy or retaining urine, painful urination, urinary incontinence, decreased urine stream, blood in the urine or vaginal/penile discharge.    Skin:    The patient denies rash, itching, skin lesions, dry skin, change in skin color or change in moles.     Hematologic/Lymphatic:  The patient denies easily bruising or bleeding or persistent swollen glands or lymph nodes.     Musculoskeletal:  The patient denies muscle aches/pain, joint pain, stiff joints, neck pain, back pain or bone pain.    Neuropsychiatric:  There is no history of migraines or severe headaches, seizure/epilepsy, speech problems, coordination problems, trembling/tremors, fainting/black outs, dizziness, memory problems, loss of sensation/numbness, problems walking, weakness, tingling or burning in hands/feet. There is no history of abusive relationship, bipolar disorder, sleep disturbance, anxiety, depression or feeling of despair.    Endocrine:    There is no history  of poor/slow wound healing, weight loss/gain, fertility or hormone problems, cold intolerance, thyroid disease.     Allergic/Immunologic:  There is no history of hives, hay fever, angioedema or anaphylaxis.    /90 (BP Location: Left arm, Patient Position: Sitting, Cuff Size: adult)   Pulse 72   Temp 97.8 °F (36.6 °C) (Temporal)   Resp 18   Ht 1.702 m (5' 7\")   Wt 83.8 kg (184 lb 12.8 oz)   LMP 11/15/2021 (Approximate)   SpO2 95%   BMI 28.94 kg/m²     Physical Exam:  The patient is an alert, oriented, well-nourished and  well-developed woman who appears her stated age. Her speech patterns and movements are normal. Her affect is appropriate.    HEENT: The head is normocephalic. The neck is supple. The thyroid is not enlarged and is without palpable masses/nodules. There are no palpable masses. The trachea is in the midline. Conjunctiva are clear, non-icteric.    Chest: The chest expands symmetrically. The lungs are clear to auscultation.    Heart: The rhythm is regular.  There are no murmurs, rubs, gallops or thrills.    Breasts:  Her breasts are symmetrical with a cup size 38DD.  Right breast: The skin, nipple ,and areola appear normal. There is no skin dimpling with movement of the pectoralis. There is no nipple retraction. No nipple discharge can be elicited. The parenchyma is mildly nodular. There are no dominant masses in the breast. The axillary tail is normal.  Left breast:   The skin, nipple, and areola appear normal. There is no skin dimpling with movement of the pectoralis. There is no nipple retraction. No nipple discharge can be elicited. The parenchyma is mildly nodular. There are no dominant masses in the breast. The axillary tail is normal.  There is a well-healed incision with no signs of infection.    Abdomen:  The abdomen is soft, flat and non tender. The liver is not enlarged. There are no palpable masses.    Lymph Nodes:  The supraclavicular, axillary and cervical regions are free of  significant lymphadenopathy.    Back: There is no vertebral column tenderness.    Skin: The skin appears normal. There are no suspicious appearing rashes or lesions.    Extremities: The extremities are without deformity, cyanosis or edema.    Impression:   Ms. Paula Brooks is a 53 year old woman presents with abnormal mammogram of left breast, s/p excisional biopsy.    Recommendations:   I had a discussion with the Patient regarding her breast exam.  She is healing well since surgery with no signs of infection. I personally reviewed her pathology.  This confirmed residual papilloma with no additional findings.  No further treatment is needed at this time.  Will plan for a bilateral diagnostic evaluation in November 2024 with a clinical exam to follow.  I anticipate if her clinical and radiological exam is stable at that time that she will resume annual screening the following year.  She was given ample opportunity for questions and those questions were answered to her satisfaction. She was encouraged to contact the office with any questions or concerns prior to her next scheduled appointment.

## 2024-02-29 PROBLEM — D24.2 INTRADUCTAL PAPILLOMA OF LEFT BREAST: Status: ACTIVE | Noted: 2024-02-29

## 2024-02-29 NOTE — TELEPHONE ENCOUNTER
yocasta message sent to pt.   LOV 2-12-24    Future Appointments   Date Time Provider Department Center   4/9/2024  9:15 AM [facility]   6/24/2024 11:40 AM [facility]

## 2024-03-01 NOTE — TELEPHONE ENCOUNTER
Patient was seen on 2/12/24. She had her procedure and is following up regarding her joint pain. She states that her knees and joints hurt daily. She would like to know what she can take as a prescription. She has tried Advil, Oscar and Tylenol with no relief.     She would also like a referral for Sandi Bueno. Referral pending.           Your laboratory results are in an acceptable range.  No significant issues at this time.  The abnormalities seen are minor and not significant at this time. Recommend taking over the counter vitamin D3 at 2,000 units daily to boost your level.  Stay on this amount until summer.   Written by Wayne Clark DO on 2/16/2024  5:14 PM CST  Seen by patient Paula Brooks on 2/26/2024  4:07 PM

## 2024-03-02 NOTE — TELEPHONE ENCOUNTER
for Dr. Clark pt is calling today to know the status of her message below. Pt stated that she needs something for her pain something that will help with inflammation. Pt also want the referral to see Myles Ramirez. Please advise Dr. Ac. Thank you

## 2024-03-02 NOTE — TELEPHONE ENCOUNTER
Referral for rheumatology signed on behalf of Dr BARBER. Can try otc ibuprofen or aleve for anti inflammatory medication.

## 2024-03-02 NOTE — TELEPHONE ENCOUNTER
Patient notified of provider's recommendation.  Patient verbalized understanding.  Patient will call [facility].

## 2024-03-04 NOTE — TELEPHONE ENCOUNTER
See referral    Referred to Provider Information:  Provider Address Phone   Sandi Bueno MD [address]  [facility] [address] [phone]     Future Appointments   Date Time Provider Department Center   3/5/2024  9:40 AM [facility]   4/9/2024  9:15 AM [facility]   6/24/2024 11:40 AM Sandi Bueno MD [facility]

## 2024-03-05 ENCOUNTER — OFFICE VISIT (OUTPATIENT)
Dept: RHEUMATOLOGY | Facility: CLINIC | Age: 54
End: 2024-03-05
Payer: COMMERCIAL

## 2024-03-05 ENCOUNTER — LAB ENCOUNTER (OUTPATIENT)
Dept: LAB | Facility: HOSPITAL | Age: 54
End: 2024-03-05
Attending: INTERNAL MEDICINE
Payer: COMMERCIAL

## 2024-03-05 ENCOUNTER — HOSPITAL ENCOUNTER (OUTPATIENT)
Dept: GENERAL RADIOLOGY | Facility: HOSPITAL | Age: 54
Discharge: HOME | End: 2024-03-05
Attending: INTERNAL MEDICINE
Payer: COMMERCIAL

## 2024-03-05 ENCOUNTER — PATIENT MESSAGE (OUTPATIENT)
Dept: RHEUMATOLOGY | Facility: CLINIC | Age: 54
End: 2024-03-05

## 2024-03-05 VITALS
WEIGHT: 187 LBS | HEIGHT: 67 IN | SYSTOLIC BLOOD PRESSURE: 149 MMHG | DIASTOLIC BLOOD PRESSURE: 92 MMHG | BODY MASS INDEX: 29.35 KG/M2 | HEART RATE: 94 BPM

## 2024-03-05 DIAGNOSIS — M79.642 BILATERAL HAND PAIN: ICD-10-CM

## 2024-03-05 DIAGNOSIS — M79.641 BILATERAL HAND PAIN: ICD-10-CM

## 2024-03-05 DIAGNOSIS — M79.642 BILATERAL HAND PAIN: Primary | ICD-10-CM

## 2024-03-05 DIAGNOSIS — M25.50 POLYARTHRALGIA: ICD-10-CM

## 2024-03-05 DIAGNOSIS — M79.641 BILATERAL HAND PAIN: Primary | ICD-10-CM

## 2024-03-05 LAB
CRP SERPL-MCNC: <0.4 MG/DL (ref ?–1)
ERYTHROCYTE [SEDIMENTATION RATE] IN BLOOD: 78 MM/HR
RHEUMATOID FACT SERPL-ACNC: <10 IU/ML (ref ?–14)

## 2024-03-05 PROCEDURE — 73130 X-RAY EXAM OF HAND: CPT | Performed by: INTERNAL MEDICINE

## 2024-03-05 PROCEDURE — 3080F DIAST BP >= 90 MM HG: CPT | Performed by: INTERNAL MEDICINE

## 2024-03-05 PROCEDURE — 81374 HLA I TYPING 1 ANTIGEN LR: CPT

## 2024-03-05 PROCEDURE — 85652 RBC SED RATE AUTOMATED: CPT | Performed by: INTERNAL MEDICINE

## 2024-03-05 PROCEDURE — 86140 C-REACTIVE PROTEIN: CPT | Performed by: INTERNAL MEDICINE

## 2024-03-05 PROCEDURE — 36415 COLL VENOUS BLD VENIPUNCTURE: CPT | Performed by: INTERNAL MEDICINE

## 2024-03-05 PROCEDURE — 99214 OFFICE O/P EST MOD 30 MIN: CPT | Performed by: INTERNAL MEDICINE

## 2024-03-05 PROCEDURE — 3008F BODY MASS INDEX DOCD: CPT | Performed by: INTERNAL MEDICINE

## 2024-03-05 PROCEDURE — 3077F SYST BP >= 140 MM HG: CPT | Performed by: INTERNAL MEDICINE

## 2024-03-05 PROCEDURE — 86200 CCP ANTIBODY: CPT | Performed by: INTERNAL MEDICINE

## 2024-03-05 PROCEDURE — 86431 RHEUMATOID FACTOR QUANT: CPT | Performed by: INTERNAL MEDICINE

## 2024-03-05 RX ORDER — NAPROXEN 500 MG/1
500 TABLET ORAL 2 TIMES DAILY WITH MEALS
Qty: 60 TABLET | Refills: 0 | Status: SHIPPED | OUTPATIENT
Start: 2024-03-05

## 2024-03-05 RX ORDER — PREDNISONE 10 MG/1
TABLET ORAL
Qty: 20 TABLET | Refills: 0 | Status: SHIPPED | OUTPATIENT
Start: 2024-03-05

## 2024-03-05 NOTE — PROGRESS NOTES
Paula Brooks is a 53 year old female.    HPI:     Chief Complaint   Patient presents with    Joint Pain    Stiffness     Pasquale Hands     Swelling     Right Hand        I had the pleasure of seeing Paula Brooks on 3/5/2024 for follow up for worsening joint pain    Current Medications:  Tylenol arthritis or aleve as needed   Blood work:  Neg RF, CCP, CRP  ESR 30  XR L knee: minimal OA, joint effusion     Interval History:  This is a 50 yo F with no medical conditions presents with left knee pain.  She reports left knee pain starting a couple of weeks ago.  She has been walking 3 miles every other day and is noticed some increased knee pain and stiffness.  She also reports intermittent swelling in the suprapatellar region.  She states that the knee is not very painful but stiff and uncomfortable.  Denies any other joint pain or swelling.  Reports stiffness throughout her body in the morning but loosens up in a couple of minutes.  Has been taking Tylenol and Aleve with minimal relief in her knee.  Denies any history of rash, psoriasis, uveitis, dactylitis, enthesitis, GI or  symptoms.    9/8/2021:  She was seen back in July for left knee pain, she received a cortisone injection her symptoms have improved  She reports less stiffness in her knee, no swelling.  She did not do physical therapy  She reports having worsening right wrist pain with numbness and tingling in her hands.  Both hands are affected but right hand is worse than the left  Symptoms started about 1 week ago  Wrist feels slightly swollen  She wakes up with numbness and tingling in her right hand  She works at a [employer] and is on the computer for most the day  She tried Aleve, Motrin and Tylenol with minimal relief  Denies any history of psoriasis, Crohn's or also colitis or lower back pain    10/8/2021:  She presents for follow-up of her right hand numbness and tingling and pain  EMG was done showing findings consistent with moderate carpal  tunnel symptoms  She states her symptoms are more persistent now, has chronic numbness and tingling in her right hand  She has tried prednisone, naproxen and Tylenol with minimal relief  Continues to wear the wrist splints at night  Has noticed some numbness in her right fourth and fifth fingers but very mild    11/12/2021:   She been following with right hand pain due to carpal tunnel symptoms.  She will be having carpal tunnel surgery on December 10  Now having numbness and tingling in her left hand mostly in the fingers  Worse in the morning. Tingling is non-stop  EMG done of R hand but not the left     2/28/2022:   Presents for follow-up of left knee pain secondary to OA  Reports some swelling in her left knee and difficulty ambulating.  She had a cortisone injection back in July 2021 and it helped.  X-ray of the left knee showed minimal OA  She had right carpal tunnel release back in December 2021 and it helped significantly.  Left wrist is doing well too.  No numbness or tingling in the left wrist.  She is also following with allergist for chronic hives.  Sh is on antihistamines and prednisone as needed.  They are try to get her approved for Xolair monthly    3/5/2024:  Presents with worsening joint pain, last seen Feb 2022  Having worsening pain in the hands for the last 1-2 mos  Having swelling across the MCPs, hard to make full fist with both hands  Pain is worse in the morning   Had Right hand carpal tunnel release, it was good for a year but then started to have pain again  No numbness or tingling in the fingers  Having pain in both knees now, no swelling in the knees  Having stiffness in the wrists and shoulders  No rash, lower back pain  Had a left breast biopsy but was benign                 HISTORY:  Past Medical History:   Diagnosis Date    Allergy     allergies    Amenorrhea 04/26/2016    Asthma (HCC)     Decorative tattoo     Diabetes (HCC)     High blood pressure     High cholesterol      Hypercholesteremia 05/14/2018    Unspecified disorder of neck     overactive gland in neck, surgical removal 1991      Social Hx Reviewed   Family Hx Reviewed     Medications (Active prior to today's visit):  Current Outpatient Medications   Medication Sig Dispense Refill    Glucose Blood (ONETOUCH VERIO) In Vitro Strip CHECK SUGARS TWICE DAILY 200 strip 0    Naproxen Sodium (ALEVE) 220 MG Oral Cap Take 220 mg by mouth daily as needed.      acetaminophen 500 MG Oral Tab Take 2 tablets (1,000 mg total) by mouth every 6 (six) hours as needed for Pain.      Tirzepatide (MOUNJARO) 5 MG/0.5ML Subcutaneous Solution Pen-injector Inject 5 mg into the skin once a week. 2 mL 0    valsartan 160 MG Oral Tab Take 1 tablet (160 mg total) by mouth daily. 30 tablet 11    Omeprazole 40 MG Oral Capsule Delayed Release Take 1 capsule (40 mg total) by mouth daily as needed. 30 capsule 5    zolpidem (AMBIEN) 10 MG Oral Tab Take 1 tablet (10 mg total) by mouth nightly as needed for Sleep. 30 tablet 5    albuterol (PROAIR HFA) 108 (90 Base) MCG/ACT Inhalation Aero Soln Inhale 2 puffs into the lungs every 6 (six) hours as needed for Wheezing. 1 each 1    EPINEPHrine (EPIPEN 2-YANY) 0.3 MG/0.3ML Injection Solution Auto-injector Inject IM in event of  allergic reaction 1 each 0    rosuvastatin 5 MG Oral Tab Take 1 tablet (5 mg total) by mouth nightly. 30 tablet 3    Blood Glucose Monitoring Suppl (ONETOUCH VERIO FLEX SYSTEM) w/Device Does not apply Kit 1 each As Directed. To check sugars twice a day E 11.65 with insulin use 1 kit 0    OneTouch Delica Lancets 33G Does not apply Misc To check sugars twice a day E 11.65 with insulin use 200 each 0    Blood Glucose Monitoring Suppl Does not apply Kit Use as directed to check blood glucose twice a day - fasting and before dinner 1 kit 0    Blood Glucose Monitoring Suppl Does not apply Device Use as directed to check blood glucose twice a day -fasting and before dinner 200 each 0    Microlet  Lancets Does not apply Misc Use as directed to check blood glucose twice a day - fasting and before dinner 200 each 0    HYDROcodone-acetaminophen 5-325 MG Oral Tab Take 1-2 tablets by mouth every 6 (six) hours as needed for Pain. (Patient not taking: Reported on 2/28/2024) 20 tablet 0    Omalizumab (XOLAIR) 150 MG/ML Subcutaneous Solution Prefilled Syringe Inject 300 mg into the skin every 28 days. (Patient not taking: Reported on 3/5/2024) 2 mL 2     .cmed  Allergies:  Allergies   Allergen Reactions    Iodine (Topical) ANAPHYLAXIS, HIVES and UNKNOWN     Eyes swell, welts on body    Shellfish HIVES         ROS:   All other ROS are negative.     PHYSICAL EXAM:   GEN: AAOx3, NAD  HEENT: EOMI, PERRLA, no injection or icterus, oral mucosa moist, no oral lesions. No lymphadenopathy. No facial rash  CVS: RRR, no murmurs rubs or gallops. Equal 2+ distal pulses.   LUNGS: CTAB, no increased work of breathing  ABDOMEN:  soft NT/ND, +BS, no HSM  SKIN: No rashes or skin lesions. No nail findings  MSK:  Cervical spine: FROM  Hands: Swelling noted diffusely in her fingers, able to make a full fist but not strong  Wrist: FROM, no pain or swelling or warmth on palpation  Elbow: FROM, no pain or swelling or warmth on palpation  Shoulders: FROM, no pain or swelling or warmth on palpation  Hip: normal log roll, no lateral hip pain, CHRIS test negative b/l  Knees: L knee no swelling, nonTTP   Ankles: FROM, no pain or swelling or warmth on palpation  Feet: no pain with MTP squeeze, no toe swelling or pain or warmth on palpation with FROM  Spine: no lumbar or sacral pain on palpation.  NEURO: Cranial nerves II-XII intact grossly. 5/5 strength throughout in both upper and lower extremities, sensation intact.  PSYCH: normal mood       LABS:     Component      Latest Ref Rng & Units 6/17/2020   C-REACTIVE PROTEIN      <0.30 mg/dL 0.34 (H)   C-Citrullinated Peptide IgG AB      0.0 - 6.9 U/mL 1.5   SED RATE      0 - 20 mm/Hr 35 (H)    RHEUMATOID FACTOR      <15 IU/mL <10     Imaging:     XR L knee:  CONCLUSION:   1. Minimal osteoarthritis.   2. Demineralization.   3. Joint effusion.      XR R knee:  Normal examination.     ASSESSMENT/PLAN:     Polyarthralgias  - She has had worsening joint pain in her hands, knees and shoulders.  The symptoms are new or  - Reports swelling in her fingers.  On exam there does appear to be some synovitis in her MCPs and PIPs.  No history of psoriasis or lower back pain  - Plan to obtain further blood work and x-rays.  Plan to also order ultrasound of both hands  - She will start prednisone taper over 9 days.  When she sees completes this she will also take naproxen 5 mg twice a day with food.  She will not combine with any other NSAIDs    Recent breast biopsy  - This was benign, biopsy showed residual papilloma    L knee pain 2/2 OA  - X-ray of left knee did show evidence of mild osteoarthritis and joint effusion  - s/p cortisone injection July 2021    R hand carpal tunnel release  - EMG done consistent with moderate carpal tunnel in her right hand  - She has tried prednisone, naproxen and Tylenol with minimal relief  - s/p Right carpal tunnel sheath injection  - She had carpal tunnel release surgery in December 2021    Pt will f/u 6 weeks     Sandi Bueno MD  3/5/2024  9:40 AM

## 2024-03-05 NOTE — PATIENT INSTRUCTIONS
You were seen today for worsening joint pain involving your hands, knees and shoulders  Plan to get more blood work and x-rays of the hands today  Also ordered ultrasound of the hands, you have to call and schedule this appointment  Try prednisone 3 pills daily for 3 days then 2 pills daily for 3 days then 1 pill daily for 3 days and then stop  We are done with prednisone you can take naproxen 5 mg twice a day with food.  Do not take with Aleve or Motrin ibuprofen  Follow-up on April 23 at 4 PM

## 2024-03-05 NOTE — TELEPHONE ENCOUNTER
From: Paula Brooks  To: Sandi Bueno  Sent: 3/5/2024 3:58 PM CST  Subject: prednisone    I picked up the medication and started it already, Thank you.

## 2024-03-06 LAB — CCP IGG SERPL-ACNC: 1 U/ML (ref 0–6.9)

## 2024-03-07 NOTE — TELEPHONE ENCOUNTER
Please advise.            HLA [employer] still pending.    Component      Latest Ref Rng 3/5/2024   C-REACTIVE PROTEIN      <1.00 mg/dL <0.40    C-Citrullinated Peptide IgG AB      0.0 - 6.9 U/mL 1.0    SED RATE      0 - 30 mm/Hr 78 (H)    RHEUMATOID FACTOR      <14 IU/mL <10       Legend:  (H) High

## 2024-03-11 LAB — HLA-B27: NEGATIVE

## 2024-03-19 DIAGNOSIS — G47.9 SLEEP DISORDER: Primary | ICD-10-CM

## 2024-03-21 RX ORDER — TIRZEPATIDE 5 MG/.5ML
5 INJECTION, SOLUTION SUBCUTANEOUS WEEKLY
Qty: 2 ML | Refills: 0 | Status: SHIPPED | OUTPATIENT
Start: 2024-03-21

## 2024-03-21 NOTE — TELEPHONE ENCOUNTER
Please review. Protocol failed / No Protocol.    Requested Prescriptions   Pending Prescriptions Disp Refills    ZOLPIDEM 10 MG Oral Tab [Pharmacy Med Name: ZOLPIDEM 10MG TABLETS] 30 tablet 0     Sig: TAKE 1 TABLET(10 MG) BY MOUTH EVERY NIGHT AS NEEDED FOR SLEEP       Controlled Substance Medication Failed - 3/19/2024  5:24 PM        Failed - This medication is a controlled substance - forward to provider to refill

## 2024-03-22 RX ORDER — ZOLPIDEM TARTRATE 10 MG/1
10 TABLET ORAL NIGHTLY PRN
Qty: 30 TABLET | Refills: 5 | Status: SHIPPED | OUTPATIENT
Start: 2024-03-22

## 2024-03-23 ENCOUNTER — PATIENT MESSAGE (OUTPATIENT)
Dept: FAMILY MEDICINE CLINIC | Facility: CLINIC | Age: 54
End: 2024-03-23

## 2024-03-23 ENCOUNTER — TELEPHONE (OUTPATIENT)
Dept: RHEUMATOLOGY | Facility: CLINIC | Age: 54
End: 2024-03-23

## 2024-03-23 DIAGNOSIS — E11.9 TYPE 2 DIABETES MELLITUS WITHOUT COMPLICATION, WITH LONG-TERM CURRENT USE OF INSULIN (HCC): Primary | ICD-10-CM

## 2024-03-23 DIAGNOSIS — Z79.4 TYPE 2 DIABETES MELLITUS WITHOUT COMPLICATION, WITH LONG-TERM CURRENT USE OF INSULIN (HCC): Primary | ICD-10-CM

## 2024-03-23 RX ORDER — PREDNISONE 5 MG/1
5 TABLET ORAL DAILY
Qty: 30 TABLET | Refills: 0 | Status: SHIPPED | OUTPATIENT
Start: 2024-03-23

## 2024-03-25 NOTE — TELEPHONE ENCOUNTER
From: Paula Brooks  To: Wayne Clark  Sent: 3/23/2024 12:51 PM CDT  Subject: Follow up for diabetes care     I have appt with Marylin Harrington for follow up , I need a referral.

## 2024-04-02 ENCOUNTER — PATIENT MESSAGE (OUTPATIENT)
Dept: FAMILY MEDICINE CLINIC | Facility: CLINIC | Age: 54
End: 2024-04-02

## 2024-04-09 ENCOUNTER — OFFICE VISIT (OUTPATIENT)
Dept: ENDOCRINOLOGY CLINIC | Facility: CLINIC | Age: 54
End: 2024-04-09
Payer: COMMERCIAL

## 2024-04-09 VITALS
BODY MASS INDEX: 28.88 KG/M2 | WEIGHT: 184 LBS | HEIGHT: 67.01 IN | DIASTOLIC BLOOD PRESSURE: 100 MMHG | SYSTOLIC BLOOD PRESSURE: 145 MMHG | HEART RATE: 90 BPM

## 2024-04-09 DIAGNOSIS — E11.65 UNCONTROLLED TYPE 2 DIABETES MELLITUS WITH HYPERGLYCEMIA (HCC): Primary | ICD-10-CM

## 2024-04-09 LAB
CARTRIDGE LOT#: ABNORMAL NUMERIC
GLUCOSE BLOOD: 111
HEMOGLOBIN A1C: 5.9 % (ref 4.3–5.6)
TEST STRIP LOT #: NORMAL NUMERIC

## 2024-04-09 PROCEDURE — 83036 HEMOGLOBIN GLYCOSYLATED A1C: CPT

## 2024-04-09 PROCEDURE — 82947 ASSAY GLUCOSE BLOOD QUANT: CPT

## 2024-04-09 PROCEDURE — 3044F HG A1C LEVEL LT 7.0%: CPT

## 2024-04-09 PROCEDURE — 3008F BODY MASS INDEX DOCD: CPT

## 2024-04-09 PROCEDURE — 3080F DIAST BP >= 90 MM HG: CPT

## 2024-04-09 PROCEDURE — 99214 OFFICE O/P EST MOD 30 MIN: CPT

## 2024-04-09 PROCEDURE — 3077F SYST BP >= 140 MM HG: CPT

## 2024-04-09 RX ORDER — DULAGLUTIDE 0.75 MG/.5ML
0.75 INJECTION, SOLUTION SUBCUTANEOUS WEEKLY
Qty: 2 ML | Refills: 3 | Status: SHIPPED | OUTPATIENT
Start: 2024-04-09

## 2024-04-09 RX ORDER — HYDROCHLOROTHIAZIDE 25 MG/1
25 TABLET ORAL DAILY
Qty: 90 TABLET | Refills: 1 | Status: SHIPPED | OUTPATIENT
Start: 2024-04-09

## 2024-04-09 NOTE — PROGRESS NOTES
Name: Paula Brooks  Date: 4/9/2024    Referring Physician: No ref. provider found    HISTORY OF PRESENT ILLNESS   Paula Brooks is a 53 year old female who presents for follow up  for diabetes mellitus.     She was admitted to the hospital 5/10/2022 after presenting to the ED with polyuria, polydipsia and generalized weakness. She was found to have significant hyperglycemia. Of note she had been taking prednisone for chronic idiopathic urticaria, but is now off prednisone after transitioning to Xolair.     Since last visit has had fairly significant joint pains in hands and knees. Undergoing workup with Rheumatology- concern for rheumatoid vs inflammatory arthritis. She has been put on prednisone since last visit for this- initially 10 mg daily and now on 5 mg daily maintenance dose.     Diabetes History:  Diagnosed- 5/2022    FH DM  -grandfather possibly    Prior glycohemoglobin were 13.2% 5/2022; 9.5% 6/2022; 6.4% 8/2022; 5.8% 12/2022; 5.6% 3/2023; 6.2% 8/2023; 5.9% 12/2023; 5.9% POC today     Dietary compliance:      Recall:  Breakfast-yogurt with strawberries, raspberries and blueberries, juicing  Lunch-protein wraps with turkey, or sometimes just fruit at work during evening shift   Dinner-protein and some vegetables - more vegetables recently   Snack-nuts and sugar free jello , fruits   Beverages- water, ginger ale occasionally     Exercise: Yes- has been walking daily, active at work     Polyuria/polydipsia: No  Blurred vision: No     Episodes of hypoglycemia: None  Blood Glucose:    Checking 1-2 times per day- Reviewed logs:    104,k 94, 116, 118, 94, 137, 124, 104, 78, 99, 96, 127, 94, 119, 130, 103, 88, 130, 112, 148, 86, 120, 93, 118, 112, 116, 89, 126, 112, 103, 125, 130, 112, 87, 108, 141      Previous DM medications  70/30 mixed insulin- stopped after weaned from oral steroids  Metformin- GI side effects and fatigue     Ozempic- significant GI side effects   Trulicity- stopped when transitioned to  Ozempic     Current DM Regimen:  Mounjaro 5mg subcutaneous weekly - tolerating medication but some intermittent nausea still. She is worried about too much weight loss with medication.    Modifying factors:  Medication adherence: yes   Recent steroids, illness or infections: Now on prednisone for arthritis. Currently on 5mg PO daily. Was on higher dose in March- 10mg daily.      REVIEW OF SYSTEMS  Eyes: Diabetic retinopathy present: No             Most recent visit to eye doctor in last 12 months: Yes- within last month. States report has been sent to office.     CV: Cardiovascular disease present: No         Hypertension present: Yes         Hyperlipidemia present: Yes         Peripheral Vascular Disease present: No    : Nephropathy present: No    Neuro: Neuropathy present: No    Skin: Infection or ulceration: No    Osteoporosis: No    Thyroid disease: No      Medications:     Current Outpatient Medications:     Dulaglutide (TRULICITY) 0.75 MG/0.5ML Subcutaneous Solution Pen-injector, Inject 0.75 mg into the skin once a week., Disp: 2 mL, Rfl: 3    hydroCHLOROthiazide 25 MG Oral Tab, Take 1 tablet (25 mg total) by mouth daily., Disp: 90 tablet, Rfl: 1    predniSONE 5 MG Oral Tab, Take 1 tablet (5 mg total) by mouth daily., Disp: 30 tablet, Rfl: 0    zolpidem 10 MG Oral Tab, Take 1 tablet (10 mg total) by mouth nightly as needed., Disp: 30 tablet, Rfl: 5    predniSONE 10 MG Oral Tab, 30 mg daily x3 days then 20 mg daily x3 days then 10 mg daily x3 days then stop, Disp: 20 tablet, Rfl: 0    naproxen 500 MG Oral Tab, Take 1 tablet (500 mg total) by mouth 2 (two) times daily with meals., Disp: 60 tablet, Rfl: 0    HYDROcodone-acetaminophen 5-325 MG Oral Tab, Take 1-2 tablets by mouth every 6 (six) hours as needed for Pain. (Patient not taking: Reported on 2/28/2024), Disp: 20 tablet, Rfl: 0    Glucose Blood (ONETOUCH VERIO) In Vitro Strip, CHECK SUGARS TWICE DAILY, Disp: 200 strip, Rfl: 0    Naproxen Sodium (ALEVE)  220 MG Oral Cap, Take 220 mg by mouth daily as needed., Disp: , Rfl:     acetaminophen 500 MG Oral Tab, Take 2 tablets (1,000 mg total) by mouth every 6 (six) hours as needed for Pain., Disp: , Rfl:     valsartan 160 MG Oral Tab, Take 1 tablet (160 mg total) by mouth daily., Disp: 30 tablet, Rfl: 11    Omeprazole 40 MG Oral Capsule Delayed Release, Take 1 capsule (40 mg total) by mouth daily as needed., Disp: 30 capsule, Rfl: 5    albuterol (PROAIR HFA) 108 (90 Base) MCG/ACT Inhalation Aero Soln, Inhale 2 puffs into the lungs every 6 (six) hours as needed for Wheezing., Disp: 1 each, Rfl: 1    EPINEPHrine (EPIPEN 2-YANY) 0.3 MG/0.3ML Injection Solution Auto-injector, Inject IM in event of  allergic reaction, Disp: 1 each, Rfl: 0    rosuvastatin 5 MG Oral Tab, Take 1 tablet (5 mg total) by mouth nightly., Disp: 30 tablet, Rfl: 3    Omalizumab (XOLAIR) 150 MG/ML Subcutaneous Solution Prefilled Syringe, Inject 300 mg into the skin every 28 days. (Patient not taking: Reported on 3/5/2024), Disp: 2 mL, Rfl: 2    Blood Glucose Monitoring Suppl (ONETOUCH VERIO FLEX SYSTEM) w/Device Does not apply Kit, 1 each As Directed. To check sugars twice a day E 11.65 with insulin use, Disp: 1 kit, Rfl: 0    OneTouch Delica Lancets 33G Does not apply Misc, To check sugars twice a day E 11.65 with insulin use, Disp: 200 each, Rfl: 0    Blood Glucose Monitoring Suppl Does not apply Kit, Use as directed to check blood glucose twice a day - fasting and before dinner, Disp: 1 kit, Rfl: 0    Blood Glucose Monitoring Suppl Does not apply Device, Use as directed to check blood glucose twice a day -fasting and before dinner, Disp: 200 each, Rfl: 0    Microlet Lancets Does not apply Misc, Use as directed to check blood glucose twice a day - fasting and before dinner, Disp: 200 each, Rfl: 0     Allergies:   Allergies   Allergen Reactions    Iodine (Topical) ANAPHYLAXIS, HIVES and UNKNOWN     Eyes swell, welts on body    Shellfish HIVES        Social History:   Social History     Socioeconomic History    Marital status:    Tobacco Use    Smoking status: Never    Smokeless tobacco: Never   Vaping Use    Vaping Use: Never used   Substance and Sexual Activity    Alcohol use: Yes     Comment: occasionally    Drug use: No    Sexual activity: Yes     Partners: Male     Birth control/protection: Implant     Comment: ESSURE   Other Topics Concern    Caffeine Concern Yes     Comment: coffee, 1/2 cup daily    Right Handed Yes       Medical History:   Past Medical History:   Diagnosis Date    Allergy     allergies    Amenorrhea 04/26/2016    Asthma (HCC)     Decorative tattoo     Diabetes (HCC)     High blood pressure     High cholesterol     Hypercholesteremia 05/14/2018    Unspecified disorder of neck     overactive gland in neck, surgical removal 1991       Surgical history:   Past Surgical History:   Procedure Laterality Date    HYSTEROSCOPY, STERILIZATION  2017    Essure    INCISIONAL BIOPSY SKIN SINGLE LESION Left 09/23/2020    left shoulder lipoma    LAXMI BIOPSY STEREO NODULE 1 SITE LEFT (CPT=19081)  12/08/2023    for focal asymetry; X clip    LAXMI LOCALIZATION WIRE 1 SITE LEFT (CPT=19281) Left 02/22/2024    vision clip    NECK/CHEST PROCEDURE UNLISTED  1991    surgical removal (overactive gland in neck)    NEEDLE BIOPSY LEFT Left 12/20/2023    US [facility]    WRIST ARTHROSCOP,RELEASE XVERS LIG Right 12/10/2021    Right endoscopic carpal tunnel release         PHYSICAL EXAM  Vitals:    04/09/24 0924 04/09/24 0952   BP: (!) 155/96 (!) 145/100   Pulse: 111 90   Weight: 184 lb (83.5 kg)    Height: 5' 7.01\" (1.702 m)            General Appearance:  alert, well developed, in no acute distress  Eyes:  normal conjunctivae, sclera, and normal pupils  Neck: Trachea midline: Normal  Back: no kyphosis or back tenderness  Respiratory:  clear to auscultation bilaterally  Cardiovascular:  regular rate, rhythm, , no murmurs, S3 or S4  Lymph Nodes:  No abnormal nodes  noted  Musculoskeletal:  normal muscle strength and tone  Skin:  normal moisture and skin texture  Hair & Nails:  normal scalp hair     Hematologic:  no excessive bruising  Neuro:  sensory grossly intact and motor grossly intact.  Psychiatric:  oriented to time, self, and place  Nutritional:  no abnormal weight gain or loss      ASSESSMENT/PLAN:    Diabetes mellitus type 2 Controlled   - HgA1c - 5.9%- stable and well controlled   -Congratulated patient on improved glycemic control   - Reviewed ABC's of diabetes  - Reviewed pathogenesis of diabetes.   -Congratulated patient on improved glycemic control.   - Reviewed importance of good glycemic control to prevent microvascular and macrovascular complications including nephropathy, neuropathy, retinopathy, and cardiovascular disease.  - Reviewed importance of SBGM- check glucose 2 times daily   - Reviewed target glucose goals for patient 80-120mg/dl fasting and <180 mg/dl post prandially   - Reviewed importance of following diabetic diet- recommended 135 grams of CHO per day or 45 grams per meal.   - Provided patient education materials  -SBGM logs show glucose levels at goal at home.     - Change Mounjaro 5mg subcutaneous weekly to Trulicity 0.75mg subcutaneous weekly. She is having persistent side effects on higher doses of weekly GLP-1 agonists but also Mounjaro on backorder at this time.     -BP elevated at visit but improved on repeat. Has been normal previously- on valsartan 160mg PO daily   -Add hydrochlorothiazide 25mg PO daily.   -Lipids normal 8/2023- LDL- 111. Started on Rosuvastatin 5mg PO daily at last visit- repeat labs at next visit.   -no nephropathy  -UTD with optho - last seen 4/2024  -normal foot exam 8/2023    RTC in 4 months   4/9/24  ANNAMARIE Michael      A total of 30 minutes was spent on obtaining history, reviewing pertinent imaging/labs and specialists notes, evaluating patient, providing multiple treatment options, reinforcing  diet/exercise and compliance, and completing documentation.

## 2024-04-11 ENCOUNTER — TELEPHONE (OUTPATIENT)
Dept: FAMILY MEDICINE CLINIC | Facility: CLINIC | Age: 54
End: 2024-04-11

## 2024-04-11 DIAGNOSIS — Z12.11 COLON CANCER SCREENING: Primary | ICD-10-CM

## 2024-04-11 NOTE — TELEPHONE ENCOUNTER
See OTHER ORDERS ==23 [facility] GI telephone colon screening  .       Dr Clark=pended telephone screening referral for approval.     From: Manisha ERIC  To: Paula Brooks  Sent: 2024  9:43 AM CDT  Subject: Overdue Health Screenings              [address] [place]  [address]  Phone: [phone]  Fax: [phone]       Hello,     This is the [facility], office of Dr. Silvino Clark.    Thank you for putting your trust in [facility].  Our goal is to deliver the highest quality healthcare and an exceptional patient experience. Review of your medical record shows you are due for the following:         Colonoscopy  Blood pressure follow up   Diabetic Eye Exam     Please call [phone] to schedule your appointment or schedule online via [employer].     If you changed to a new provider at another facility, please notify the clinic to update your records.    If you had any recent testing at another facility, please have your results faxed to our office at [phone].     Thank you and have a great day!

## 2024-04-23 ENCOUNTER — OFFICE VISIT (OUTPATIENT)
Dept: RHEUMATOLOGY | Facility: CLINIC | Age: 54
End: 2024-04-23
Payer: COMMERCIAL

## 2024-04-23 ENCOUNTER — LAB ENCOUNTER (OUTPATIENT)
Dept: LAB | Facility: HOSPITAL | Age: 54
End: 2024-04-23
Attending: INTERNAL MEDICINE
Payer: COMMERCIAL

## 2024-04-23 VITALS
SYSTOLIC BLOOD PRESSURE: 147 MMHG | WEIGHT: 186 LBS | HEART RATE: 80 BPM | BODY MASS INDEX: 29.19 KG/M2 | DIASTOLIC BLOOD PRESSURE: 82 MMHG | HEIGHT: 67.01 IN

## 2024-04-23 DIAGNOSIS — M79.641 BILATERAL HAND PAIN: ICD-10-CM

## 2024-04-23 DIAGNOSIS — M06.00 SERONEGATIVE RHEUMATOID ARTHRITIS (HCC): Primary | ICD-10-CM

## 2024-04-23 DIAGNOSIS — M79.642 BILATERAL HAND PAIN: ICD-10-CM

## 2024-04-23 DIAGNOSIS — M25.562 CHRONIC PAIN OF BOTH KNEES: ICD-10-CM

## 2024-04-23 DIAGNOSIS — M25.561 CHRONIC PAIN OF BOTH KNEES: ICD-10-CM

## 2024-04-23 DIAGNOSIS — G89.29 CHRONIC PAIN OF BOTH KNEES: ICD-10-CM

## 2024-04-23 DIAGNOSIS — M06.00 SERONEGATIVE RHEUMATOID ARTHRITIS (HCC): ICD-10-CM

## 2024-04-23 LAB
ALBUMIN SERPL-MCNC: 4.3 G/DL (ref 3.2–4.8)
ALBUMIN/GLOB SERPL: 0.9 {RATIO} (ref 1–2)
ALP LIVER SERPL-CCNC: 77 U/L
ALT SERPL-CCNC: 11 U/L
ANION GAP SERPL CALC-SCNC: 6 MMOL/L (ref 0–18)
AST SERPL-CCNC: 30 U/L (ref ?–34)
BILIRUB SERPL-MCNC: 0.4 MG/DL (ref 0.3–1.2)
BUN BLD-MCNC: 11 MG/DL (ref 9–23)
BUN/CREAT SERPL: 14.7 (ref 10–20)
CALCIUM BLD-MCNC: 9.7 MG/DL (ref 8.7–10.4)
CHLORIDE SERPL-SCNC: 105 MMOL/L (ref 98–112)
CO2 SERPL-SCNC: 27 MMOL/L (ref 21–32)
CREAT BLD-MCNC: 0.75 MG/DL
EGFRCR SERPLBLD CKD-EPI 2021: 96 ML/MIN/1.73M2 (ref 60–?)
FASTING STATUS PATIENT QL REPORTED: NO
GLOBULIN PLAS-MCNC: 4.9 G/DL (ref 2.8–4.4)
GLUCOSE BLD-MCNC: 104 MG/DL (ref 70–99)
HBV CORE AB SERPL QL IA: REACTIVE
HBV SURFACE AB SER QL: REACTIVE
HBV SURFACE AB SERPL IA-ACNC: 574.48 MIU/ML
OSMOLALITY SERPL CALC.SUM OF ELEC: 286 MOSM/KG (ref 275–295)
POTASSIUM SERPL-SCNC: 4.3 MMOL/L (ref 3.5–5.1)
PROT SERPL-MCNC: 9.2 G/DL (ref 5.7–8.2)
SODIUM SERPL-SCNC: 138 MMOL/L (ref 136–145)

## 2024-04-23 PROCEDURE — 3077F SYST BP >= 140 MM HG: CPT | Performed by: INTERNAL MEDICINE

## 2024-04-23 PROCEDURE — 86038 ANTINUCLEAR ANTIBODIES: CPT | Performed by: INTERNAL MEDICINE

## 2024-04-23 PROCEDURE — 86225 DNA ANTIBODY NATIVE: CPT | Performed by: INTERNAL MEDICINE

## 2024-04-23 PROCEDURE — 86706 HEP B SURFACE ANTIBODY: CPT | Performed by: INTERNAL MEDICINE

## 2024-04-23 PROCEDURE — 86480 TB TEST CELL IMMUN MEASURE: CPT

## 2024-04-23 PROCEDURE — 99214 OFFICE O/P EST MOD 30 MIN: CPT | Performed by: INTERNAL MEDICINE

## 2024-04-23 PROCEDURE — 36415 COLL VENOUS BLD VENIPUNCTURE: CPT | Performed by: INTERNAL MEDICINE

## 2024-04-23 PROCEDURE — 3008F BODY MASS INDEX DOCD: CPT | Performed by: INTERNAL MEDICINE

## 2024-04-23 PROCEDURE — 86704 HEP B CORE ANTIBODY TOTAL: CPT | Performed by: INTERNAL MEDICINE

## 2024-04-23 PROCEDURE — 20610 DRAIN/INJ JOINT/BURSA W/O US: CPT | Performed by: INTERNAL MEDICINE

## 2024-04-23 PROCEDURE — 86235 NUCLEAR ANTIGEN ANTIBODY: CPT | Performed by: INTERNAL MEDICINE

## 2024-04-23 PROCEDURE — 86039 ANTINUCLEAR ANTIBODIES (ANA): CPT | Performed by: INTERNAL MEDICINE

## 2024-04-23 PROCEDURE — 80053 COMPREHEN METABOLIC PANEL: CPT | Performed by: INTERNAL MEDICINE

## 2024-04-23 PROCEDURE — 3079F DIAST BP 80-89 MM HG: CPT | Performed by: INTERNAL MEDICINE

## 2024-04-23 RX ORDER — TRIAMCINOLONE ACETONIDE 40 MG/ML
40 INJECTION, SUSPENSION INTRA-ARTICULAR; INTRAMUSCULAR ONCE
Status: COMPLETED | OUTPATIENT
Start: 2024-04-23 | End: 2024-04-23

## 2024-04-23 RX ORDER — PREDNISONE 10 MG/1
TABLET ORAL
Qty: 90 TABLET | Refills: 0 | Status: SHIPPED | OUTPATIENT
Start: 2024-04-23 | End: 2024-04-23

## 2024-04-23 RX ORDER — FOLIC ACID 1 MG/1
1 TABLET ORAL DAILY
Qty: 90 TABLET | Refills: 0 | Status: SHIPPED | OUTPATIENT
Start: 2024-04-23

## 2024-04-23 RX ORDER — NAPROXEN 500 MG/1
500 TABLET ORAL 2 TIMES DAILY WITH MEALS
Qty: 60 TABLET | Refills: 0 | Status: SHIPPED | OUTPATIENT
Start: 2024-04-23

## 2024-04-23 RX ORDER — METHOTREXATE 2.5 MG/1
TABLET ORAL
Qty: 78 TABLET | Refills: 0 | Status: SHIPPED | OUTPATIENT
Start: 2024-04-23

## 2024-04-23 NOTE — PROGRESS NOTES
Paula Brooks is a 53 year old female.    HPI:     Chief Complaint   Patient presents with    Hand Pain     Pasquale Hand pain and mild swelling     Knee Pain     Pasquale        I had the pleasure of seeing Paula Brooks on 3/5/2024 for follow up for worsening joint pain    Current Medications:  Tylenol arthritis or aleve as needed   Blood work:  Neg RF, CCP, CRP  ESR 30  XR L knee: minimal OA, joint effusion     Interval History:  This is a 48 yo F with no medical conditions presents with left knee pain.  She reports left knee pain starting a couple of weeks ago.  She has been walking 3 miles every other day and is noticed some increased knee pain and stiffness.  She also reports intermittent swelling in the suprapatellar region.  She states that the knee is not very painful but stiff and uncomfortable.  Denies any other joint pain or swelling.  Reports stiffness throughout her body in the morning but loosens up in a couple of minutes.  Has been taking Tylenol and Aleve with minimal relief in her knee.  Denies any history of rash, psoriasis, uveitis, dactylitis, enthesitis, GI or  symptoms.    9/8/2021:  She was seen back in July for left knee pain, she received a cortisone injection her symptoms have improved  She reports less stiffness in her knee, no swelling.  She did not do physical therapy  She reports having worsening right wrist pain with numbness and tingling in her hands.  Both hands are affected but right hand is worse than the left  Symptoms started about 1 week ago  Wrist feels slightly swollen  She wakes up with numbness and tingling in her right hand  She works at a [employer] and is on the computer for most the day  She tried Aleve, Motrin and Tylenol with minimal relief  Denies any history of psoriasis, Crohn's or also colitis or lower back pain    10/8/2021:  She presents for follow-up of her right hand numbness and tingling and pain  EMG was done showing findings consistent with moderate  carpal tunnel symptoms  She states her symptoms are more persistent now, has chronic numbness and tingling in her right hand  She has tried prednisone, naproxen and Tylenol with minimal relief  Continues to wear the wrist splints at night  Has noticed some numbness in her right fourth and fifth fingers but very mild    11/12/2021:   She been following with right hand pain due to carpal tunnel symptoms.  She will be having carpal tunnel surgery on December 10  Now having numbness and tingling in her left hand mostly in the fingers  Worse in the morning. Tingling is non-stop  EMG done of R hand but not the left     2/28/2022:   Presents for follow-up of left knee pain secondary to OA  Reports some swelling in her left knee and difficulty ambulating.  She had a cortisone injection back in July 2021 and it helped.  X-ray of the left knee showed minimal OA  She had right carpal tunnel release back in December 2021 and it helped significantly.  Left wrist is doing well too.  No numbness or tingling in the left wrist.  She is also following with allergist for chronic hives.  Sh is on antihistamines and prednisone as needed.  They are try to get her approved for Xolair monthly    3/5/2024:  Presents with worsening joint pain, last seen Feb 2022  Having worsening pain in the hands for the last 1-2 mos  Having swelling across the MCPs, hard to make full fist with both hands  Pain is worse in the morning   Had Right hand carpal tunnel release, it was good for a year but then started to have pain again  No numbness or tingling in the fingers  Having pain in both knees now, no swelling in the knees  Having stiffness in the wrists and shoulders  No rash, lower back pain  Had a left breast biopsy but was benign     4/23/2024:  Presents for f/u of joint pain, mostly in the hands and the knees  When waking up in the morning hands are stiff  Had surgery in the right hand for carpal tunnel 2021, no n/t now but stiffness  Has swelling  in the fingers  When put on prednisone it does help, when on prednisone 5 mg symptoms were stable but still has some pain and swelling  Has pin in both knees, left is worse than right                HISTORY:  Past Medical History:    Allergy    allergies    Amenorrhea    Asthma (HCC)    Decorative tattoo    Diabetes (HCC)    High blood pressure    High cholesterol    Hypercholesteremia    Unspecified disorder of neck    overactive gland in neck, surgical removal 1991      Social Hx Reviewed   Family Hx Reviewed     Medications (Active prior to today's visit):  Current Outpatient Medications   Medication Sig Dispense Refill    Dulaglutide (TRULICITY) 0.75 MG/0.5ML Subcutaneous Solution Pen-injector Inject 0.75 mg into the skin once a week. 2 mL 3    hydroCHLOROthiazide 25 MG Oral Tab Take 1 tablet (25 mg total) by mouth daily. 90 tablet 1    zolpidem 10 MG Oral Tab Take 1 tablet (10 mg total) by mouth nightly as needed. 30 tablet 5    naproxen 500 MG Oral Tab Take 1 tablet (500 mg total) by mouth 2 (two) times daily with meals. 60 tablet 0    Glucose Blood (ONETOUCH VERIO) In Vitro Strip CHECK SUGARS TWICE DAILY 200 strip 0    Naproxen Sodium (ALEVE) 220 MG Oral Cap Take 220 mg by mouth daily as needed.      acetaminophen 500 MG Oral Tab Take 2 tablets (1,000 mg total) by mouth every 6 (six) hours as needed for Pain.      valsartan 160 MG Oral Tab Take 1 tablet (160 mg total) by mouth daily. 30 tablet 11    Omeprazole 40 MG Oral Capsule Delayed Release Take 1 capsule (40 mg total) by mouth daily as needed. 30 capsule 5    albuterol (PROAIR HFA) 108 (90 Base) MCG/ACT Inhalation Aero Soln Inhale 2 puffs into the lungs every 6 (six) hours as needed for Wheezing. 1 each 1    EPINEPHrine (EPIPEN 2-YANY) 0.3 MG/0.3ML Injection Solution Auto-injector Inject IM in event of  allergic reaction 1 each 0    rosuvastatin 5 MG Oral Tab Take 1 tablet (5 mg total) by mouth nightly. 30 tablet 3    Blood Glucose Monitoring Suppl  (ONETOUCH VERIO FLEX SYSTEM) w/Device Does not apply Kit 1 each As Directed. To check sugars twice a day E 11.65 with insulin use 1 kit 0    OneTouch Delica Lancets 33G Does not apply Misc To check sugars twice a day E 11.65 with insulin use 200 each 0    Blood Glucose Monitoring Suppl Does not apply Kit Use as directed to check blood glucose twice a day - fasting and before dinner 1 kit 0    Blood Glucose Monitoring Suppl Does not apply Device Use as directed to check blood glucose twice a day -fasting and before dinner 200 each 0    Microlet Lancets Does not apply Misc Use as directed to check blood glucose twice a day - fasting and before dinner 200 each 0    predniSONE 5 MG Oral Tab Take 1 tablet (5 mg total) by mouth daily. (Patient not taking: Reported on 4/23/2024) 30 tablet 0    predniSONE 10 MG Oral Tab 30 mg daily x3 days then 20 mg daily x3 days then 10 mg daily x3 days then stop 20 tablet 0    HYDROcodone-acetaminophen 5-325 MG Oral Tab Take 1-2 tablets by mouth every 6 (six) hours as needed for Pain. (Patient not taking: Reported on 2/28/2024) 20 tablet 0    Omalizumab (XOLAIR) 150 MG/ML Subcutaneous Solution Prefilled Syringe Inject 300 mg into the skin every 28 days. (Patient not taking: Reported on 3/5/2024) 2 mL 2     .cmed  Allergies:  Allergies   Allergen Reactions    Iodine (Topical) ANAPHYLAXIS, HIVES and UNKNOWN     Eyes swell, welts on body    Shellfish HIVES         ROS:   All other ROS are negative.     PHYSICAL EXAM:   GEN: AAOx3, NAD  HEENT: EOMI, PERRLA, no injection or icterus, oral mucosa moist, no oral lesions. No lymphadenopathy. No facial rash  CVS: RRR, no murmurs rubs or gallops. Equal 2+ distal pulses.   LUNGS: CTAB, no increased work of breathing  ABDOMEN:  soft NT/ND, +BS, no HSM  SKIN: No rashes or skin lesions. No nail findings  MSK:  Cervical spine: FROM  Hands: Swelling noted diffusely in her fingers, able to make a full fist but not strong  Wrist: FROM, no pain or swelling  or warmth on palpation  Elbow: FROM, no pain or swelling or warmth on palpation  Shoulders: FROM, no pain or swelling or warmth on palpation  Hip: normal log roll, no lateral hip pain, CHRIS test negative b/l  Knees: L knee no swelling, nonTTP   Ankles: FROM, no pain or swelling or warmth on palpation  Feet: no pain with MTP squeeze, no toe swelling or pain or warmth on palpation with FROM  Spine: no lumbar or sacral pain on palpation.  NEURO: Cranial nerves II-XII intact grossly. 5/5 strength throughout in both upper and lower extremities, sensation intact.  PSYCH: normal mood       LABS:     Component      Latest Ref Rng & Units 6/17/2020   C-REACTIVE PROTEIN      <0.30 mg/dL 0.34 (H)   C-Citrullinated Peptide IgG AB      0.0 - 6.9 U/mL 1.5   SED RATE      0 - 20 mm/Hr 35 (H)   RHEUMATOID FACTOR      <15 IU/mL <10     Imaging:     XR L knee:  CONCLUSION:   1. Minimal osteoarthritis.   2. Demineralization.   3. Joint effusion.      XR R knee:  Normal examination.     ASSESSMENT/PLAN:     Polyarthralgias concerning for seronegative RA  - She has had worsening joint pain in her hands, knees and shoulders.  The symptoms are new or  - Reports swelling in her fingers.  On exam there does appear to be some synovitis in her MCPs and PIPs.  No history of psoriasis or lower back pain  - Blood work showing elevated ESR of 78  - She also responds to prednisone, helps the pain and swelling  - She has ultrasound the hand schedule in July  - Plan is right methotrexate 4 pills weekly for 2 weeks and increase to 6 pills weekly  Risks/Benefits of MTX d/w patient which include: nausea and vomiting, fatigue, oral ulcers, hepatic toxicity, pancytopenia, increased risk of infections, pneumonitis, flu-like symptoms, lymphoma, teratogenic. Pt should avoid alcohol use.   Will need to obtain Hep BsAb, Hep BsAg, Hep BcAb Total, Hepatitis C, Quantiferon TB, CBC, CMP,   Labs will need to monitored every 4 weeks until patient is on a stable  dose and then labs can be monitored every 3 months.   Pt advised to take Folic Acid 1 mg daily as it can help prevent mouth sores  - She would like to avoid prednisone as it caused some swelling in her face  - She will try naproxen twice a day for her pain, she will take it with food and not combine it with any other NSAIDs    Recent breast biopsy  - This was benign, biopsy showed residual papilloma    B/L knee pain 2/2 OA  - X-ray of left knee did show evidence of mild osteoarthritis and joint effusion  - s/p cortisone injection July 2021  - B/L knees injected with 1 cc of lidocaine and 40 mg of Kenalog in sterile fashion.  Patient tolerated procedure well    R hand carpal tunnel release  - EMG done consistent with moderate carpal tunnel in her right hand  - She has tried prednisone, naproxen and Tylenol with minimal relief  - s/p Right carpal tunnel sheath injection  - She had carpal tunnel release surgery in December 2021    Pt will f/u 2 mos     Sandi Bueno MD  4/23/2024  4:20 PM

## 2024-04-23 NOTE — PROCEDURES
With paitent's consent, I injected pt's B/L knee with 1ml lidocaine 1 % and 1 ml kenalog 40. It was done under sterile technique using iodine and alcohol swabs and ethyl chloride was used as an anaesthetic spray. Pt.  tolerated it well.

## 2024-04-23 NOTE — PATIENT INSTRUCTIONS
You are seen today for joint pain likely from rheumatoid arthritis  I injected both knees with cortisone  Rest for the next 2 or 3 days  Get blood work today  Once I get the results and the liver enzymes look normal you can start methotrexate  Methotrexate 4 pills every 7 days for 2 weeks and increase to 6 pills every 7 days  Folic acid 1 pill a day  You can take naproxen once or twice a day for your pain, do not take it with Aleve or Motrin ibuprofen  Follow-up on July 8 at 9 AM

## 2024-04-24 ENCOUNTER — TELEPHONE (OUTPATIENT)
Dept: FAMILY MEDICINE CLINIC | Facility: CLINIC | Age: 54
End: 2024-04-24

## 2024-04-25 LAB
DSDNA AB TITR SER: <10 {TITER}
M TB IFN-G CD4+ T-CELLS BLD-ACNC: 0.03 IU/ML
M TB TUBERC IFN-G BLD QL: NEGATIVE
M TB TUBERC IGNF/MITOGEN IGNF CONTROL: >10 IU/ML
NUCLEAR IGG TITR SER IF: POSITIVE {TITER}
QFT TB1 AG MINUS NIL: 0.01 IU/ML
QFT TB2 AG MINUS NIL: 0.02 IU/ML

## 2024-04-26 LAB — ANA NUCLEOLAR TITR SER IF: 5120 {TITER}

## 2024-04-30 LAB
CENTROMERE IGG SER-ACNC: 1.1 U/ML
ENA JO1 AB SER IA-ACNC: 0.7 U/ML
ENA RNP IGG SER IA-ACNC: >218 U/ML
ENA SCL70 IGG SER IA-ACNC: 2.1 U/ML
ENA SM IGG SER IA-ACNC: 3.5 U/ML
ENA SS-A IGG SER IA-ACNC: 9.2 U/ML
ENA SS-B IGG SER IA-ACNC: 5 U/ML
U1 SNRNP IGG SER IA-ACNC: 221 U/ML

## 2024-05-09 ENCOUNTER — PATIENT MESSAGE (OUTPATIENT)
Dept: RHEUMATOLOGY | Facility: CLINIC | Age: 54
End: 2024-05-09

## 2024-05-09 DIAGNOSIS — M06.00 SERONEGATIVE RHEUMATOID ARTHRITIS (HCC): Primary | ICD-10-CM

## 2024-05-09 DIAGNOSIS — M35.1 MCTD (MIXED CONNECTIVE TISSUE DISEASE) (HCC): ICD-10-CM

## 2024-05-09 NOTE — TELEPHONE ENCOUNTER
From: Paula Brooks  To: Sandi Bueno  Sent: 5/9/2024 12:30 PM CDT  Subject: skin rashes     I have a skin rash on my hand and knee with itching , I read its one of the symptons of the connective tissue disease. Can you recommend an ointment to help with the itching.

## 2024-05-17 NOTE — TELEPHONE ENCOUNTER
Called patient back regarding rash, it does appear like hives.  Started when she started methotrexate.  She will try Claritin or Zyrtec to see if it helps.

## 2024-05-18 ENCOUNTER — HOSPITAL ENCOUNTER (OUTPATIENT)
Dept: GENERAL RADIOLOGY | Age: 54
Discharge: HOME | End: 2024-05-18
Attending: INTERNAL MEDICINE

## 2024-05-18 DIAGNOSIS — M35.1 MCTD (MIXED CONNECTIVE TISSUE DISEASE) (HCC): ICD-10-CM

## 2024-05-18 DIAGNOSIS — M06.00 SERONEGATIVE RHEUMATOID ARTHRITIS (HCC): ICD-10-CM

## 2024-05-18 PROCEDURE — 71046 X-RAY EXAM CHEST 2 VIEWS: CPT | Performed by: INTERNAL MEDICINE

## 2024-05-21 RX ORDER — NAPROXEN 500 MG/1
500 TABLET ORAL 2 TIMES DAILY WITH MEALS
Qty: 60 TABLET | Refills: 0 | Status: SHIPPED | OUTPATIENT
Start: 2024-05-21

## 2024-05-21 NOTE — TELEPHONE ENCOUNTER
LOV: 4/23/24  Last Refilled:#[id number], 0rfs  4/23/24    ASSESSMENT/PLAN:      Polyarthralgias concerning for seronegative RA  - She has had worsening joint pain in her hands, knees and shoulders.  The symptoms are new or  - Reports swelling in her fingers.  On exam there does appear to be some synovitis in her MCPs and PIPs.  No history of psoriasis or lower back pain  - Blood work showing elevated ESR of 78  - She also responds to prednisone, helps the pain and swelling  - She has ultrasound the hand schedule in July  - Plan is right methotrexate 4 pills weekly for 2 weeks and increase to 6 pills weekly  Risks/Benefits of MTX d/w patient which include: nausea and vomiting, fatigue, oral ulcers, hepatic toxicity, pancytopenia, increased risk of infections, pneumonitis, flu-like symptoms, lymphoma, teratogenic. Pt should avoid alcohol use.   Will need to obtain Hep BsAb, Hep BsAg, Hep BcAb Total, Hepatitis C, Quantiferon TB, CBC, CMP,   Labs will need to monitored every 4 weeks until patient is on a stable dose and then labs can be monitored every 3 months.   Pt advised to take Folic Acid 1 mg daily as it can help prevent mouth sores  - She would like to avoid prednisone as it caused some swelling in her face  - She will try naproxen twice a day for her pain, she will take it with food and not combine it with any other NSAIDs     Recent breast biopsy  - This was benign, biopsy showed residual papilloma     B/L knee pain 2/2 OA  - X-ray of left knee did show evidence of mild osteoarthritis and joint effusion  - s/p cortisone injection July 2021  - B/L knees injected with 1 cc of lidocaine and 40 mg of Kenalog in sterile fashion.  Patient tolerated procedure well     R hand carpal tunnel release  - EMG done consistent with moderate carpal tunnel in her right hand  - She has tried prednisone, naproxen and Tylenol with minimal relief  - s/p Right carpal tunnel sheath injection  - She had carpal tunnel release surgery in  December 2021     Pt will f/u 2 manuel Bueno MD  4/23/2024  4:20 PM                 Please advise.

## 2024-06-10 RX ORDER — METHOTREXATE 2.5 MG/1
TABLET ORAL
Qty: 78 TABLET | Refills: 0 | Status: SHIPPED | OUTPATIENT
Start: 2024-06-10

## 2024-06-10 NOTE — TELEPHONE ENCOUNTER
Please edit patient sig on Rx    LOV: 4/23/24  Last Refilled:#78, 0rfs 4/23/24  Labs:AST 30  ALT 11  4/23/24    ASSESSMENT/PLAN:      Polyarthralgias concerning for seronegative RA  - She has had worsening joint pain in her hands, knees and shoulders.  The symptoms are new or  - Reports swelling in her fingers.  On exam there does appear to be some synovitis in her MCPs and PIPs.  No history of psoriasis or lower back pain  - Blood work showing elevated ESR of 78  - She also responds to prednisone, helps the pain and swelling  - She has ultrasound the hand schedule in July  - Plan is right methotrexate 4 pills weekly for 2 weeks and increase to 6 pills weekly  Risks/Benefits of MTX d/w patient which include: nausea and vomiting, fatigue, oral ulcers, hepatic toxicity, pancytopenia, increased risk of infections, pneumonitis, flu-like symptoms, lymphoma, teratogenic. Pt should avoid alcohol use.   Will need to obtain Hep BsAb, Hep BsAg, Hep BcAb Total, Hepatitis C, Quantiferon TB, CBC, CMP,   Labs will need to monitored every 4 weeks until patient is on a stable dose and then labs can be monitored every 3 months.   Pt advised to take Folic Acid 1 mg daily as it can help prevent mouth sores  - She would like to avoid prednisone as it caused some swelling in her face  - She will try naproxen twice a day for her pain, she will take it with food and not combine it with any other NSAIDs     Recent breast biopsy  - This was benign, biopsy showed residual papilloma     B/L knee pain 2/2 OA  - X-ray of left knee did show evidence of mild osteoarthritis and joint effusion  - s/p cortisone injection July 2021  - B/L knees injected with 1 cc of lidocaine and 40 mg of Kenalog in sterile fashion.  Patient tolerated procedure well     R hand carpal tunnel release  - EMG done consistent with moderate carpal tunnel in her right hand  - She has tried prednisone, naproxen and Tylenol with minimal relief  - s/p Right carpal tunnel  sheath injection  - She had carpal tunnel release surgery in December 2021     Pt will f/u 2 mos      Sandi Bueno MD  4/23/2024  4:20 PM              Please advise.

## 2024-06-26 DIAGNOSIS — J45.20 MILD INTERMITTENT EXTRINSIC ASTHMA WITHOUT COMPLICATION (HCC): ICD-10-CM

## 2024-06-26 RX ORDER — ALBUTEROL SULFATE 90 UG/1
2 AEROSOL, METERED RESPIRATORY (INHALATION) EVERY 6 HOURS PRN
Qty: 1 EACH | Refills: 1 | Status: SHIPPED | OUTPATIENT
Start: 2024-06-26

## 2024-06-26 NOTE — TELEPHONE ENCOUNTER
Spoke with patient. Verified name and . Informed patient our office received a refill request for Albuterol inhaler and per protocol will need to assess how her asthma is doing. Patient states she is doing well and that her Albuterol inhaler is  and would like to have one a hand.    Also informed patient she is due for an office visit as last office visit was 23. Patient she will contact our office to schedule a follow up visit.    Dr. Marcelino harry we refill Albuterol inhaler , patient to contact our office to schedule appointment.

## 2024-07-01 ENCOUNTER — HOSPITAL ENCOUNTER (OUTPATIENT)
Dept: ULTRASOUND IMAGING | Facility: HOSPITAL | Age: 54
Discharge: HOME | End: 2024-07-01
Attending: INTERNAL MEDICINE
Payer: COMMERCIAL

## 2024-07-01 DIAGNOSIS — M79.641 BILATERAL HAND PAIN: ICD-10-CM

## 2024-07-01 DIAGNOSIS — M79.642 BILATERAL HAND PAIN: ICD-10-CM

## 2024-07-01 PROCEDURE — 76881 US COMPL JOINT R-T W/IMG: CPT | Performed by: INTERNAL MEDICINE

## 2024-07-08 ENCOUNTER — OFFICE VISIT (OUTPATIENT)
Dept: RHEUMATOLOGY | Facility: CLINIC | Age: 54
End: 2024-07-08
Payer: COMMERCIAL

## 2024-07-08 ENCOUNTER — LAB ENCOUNTER (OUTPATIENT)
Dept: LAB | Facility: HOSPITAL | Age: 54
End: 2024-07-08
Attending: INTERNAL MEDICINE
Payer: COMMERCIAL

## 2024-07-08 VITALS
WEIGHT: 191 LBS | DIASTOLIC BLOOD PRESSURE: 96 MMHG | SYSTOLIC BLOOD PRESSURE: 150 MMHG | BODY MASS INDEX: 29.98 KG/M2 | HEART RATE: 89 BPM | HEIGHT: 67 IN

## 2024-07-08 DIAGNOSIS — Z51.81 MEDICATION MONITORING ENCOUNTER: ICD-10-CM

## 2024-07-08 DIAGNOSIS — M79.642 BILATERAL HAND PAIN: ICD-10-CM

## 2024-07-08 DIAGNOSIS — G89.29 CHRONIC PAIN OF BOTH KNEES: ICD-10-CM

## 2024-07-08 DIAGNOSIS — M79.641 BILATERAL HAND PAIN: ICD-10-CM

## 2024-07-08 DIAGNOSIS — M25.562 CHRONIC PAIN OF BOTH KNEES: ICD-10-CM

## 2024-07-08 DIAGNOSIS — M25.561 CHRONIC PAIN OF BOTH KNEES: ICD-10-CM

## 2024-07-08 DIAGNOSIS — M35.1 MCTD (MIXED CONNECTIVE TISSUE DISEASE) (HCC): ICD-10-CM

## 2024-07-08 DIAGNOSIS — M35.1 MCTD (MIXED CONNECTIVE TISSUE DISEASE) (HCC): Primary | ICD-10-CM

## 2024-07-08 PROCEDURE — 84165 PROTEIN E-PHORESIS SERUM: CPT

## 2024-07-08 PROCEDURE — 3008F BODY MASS INDEX DOCD: CPT | Performed by: INTERNAL MEDICINE

## 2024-07-08 PROCEDURE — 3080F DIAST BP >= 90 MM HG: CPT | Performed by: INTERNAL MEDICINE

## 2024-07-08 PROCEDURE — 3077F SYST BP >= 140 MM HG: CPT | Performed by: INTERNAL MEDICINE

## 2024-07-08 PROCEDURE — 99214 OFFICE O/P EST MOD 30 MIN: CPT | Performed by: INTERNAL MEDICINE

## 2024-07-08 PROCEDURE — 83521 IG LIGHT CHAINS FREE EACH: CPT

## 2024-07-08 PROCEDURE — 36415 COLL VENOUS BLD VENIPUNCTURE: CPT

## 2024-07-08 PROCEDURE — G2211 COMPLEX E/M VISIT ADD ON: HCPCS | Performed by: INTERNAL MEDICINE

## 2024-07-08 PROCEDURE — 86334 IMMUNOFIX E-PHORESIS SERUM: CPT

## 2024-07-08 RX ORDER — PREDNISONE 5 MG/1
5 TABLET ORAL DAILY
Qty: 30 TABLET | Refills: 0 | Status: SHIPPED | OUTPATIENT
Start: 2024-07-08

## 2024-07-08 RX ORDER — FOLIC ACID 1 MG/1
1 TABLET ORAL DAILY
Qty: 90 TABLET | Refills: 2 | Status: SHIPPED | OUTPATIENT
Start: 2024-07-08

## 2024-07-08 RX ORDER — DULAGLUTIDE 1.5 MG/.5ML
1.5 INJECTION, SOLUTION SUBCUTANEOUS
Qty: 2 ML | Refills: 5 | OUTPATIENT
Start: 2024-07-08

## 2024-07-08 RX ORDER — HYDROXYCHLOROQUINE SULFATE 200 MG/1
400 TABLET, FILM COATED ORAL DAILY
Qty: 180 TABLET | Refills: 0 | Status: SHIPPED | OUTPATIENT
Start: 2024-07-08

## 2024-07-08 RX ORDER — METHOTREXATE 2.5 MG/1
15 TABLET ORAL WEEKLY
Qty: 78 TABLET | Refills: 0 | Status: SHIPPED | OUTPATIENT
Start: 2024-07-08

## 2024-07-08 NOTE — TELEPHONE ENCOUNTER
Endocrine Refill protocol for oral and injectable diabetic medications    Protocol Criteria:pass    -Appointment with Endocrinology completed in the last 6 months or scheduled in the next 3 months    -A1c result below 8.5% in the past 6 months      Verify the above has been completed or scheduled in the appropriate timeline. If so can send a 90 day supply with 1 refill.     Last completed office visit: 4/9/2024 Marylin Harrington APRN   Next scheduled Follow up: no future appt     Last A1c result: Last A1c value was 5.9% done 4/9/2024.

## 2024-07-08 NOTE — PROGRESS NOTES
Paula Brooks is a 53 year old female.    HPI:     Chief Complaint   Patient presents with    Rheumatoid Arthritis    Hand Pain       I had the pleasure of seeing Paula Brooks on 7/8/2024 for follow up for MCTD (+KAREN, RNP, SSA, inflammatory arthritis)    Current Medications:  Tylenol arthritis or aleve as needed   Methotrexate 6 pills weekly- started 3/2024  Naproxen 500 mg BID  Prednisone as needed   Blood work:  +KAREN 1:5120 speckled pattern, +SSA, RNP  Neg RF, CCP, CRP  ESR 30  XR L knee: minimal OA, joint effusion   US hands: Synovial thickening involving the MCP and IP joints of both hands, mild hyperemia involving the right first MCP joint consistent with active synovitis, no erosions  CXR 5/2024: normal      Interval History:  This is a 50 yo F with no medical conditions presents with left knee pain.  She reports left knee pain starting a couple of weeks ago.  She has been walking 3 miles every other day and is noticed some increased knee pain and stiffness.  She also reports intermittent swelling in the suprapatellar region.  She states that the knee is not very painful but stiff and uncomfortable.  Denies any other joint pain or swelling.  Reports stiffness throughout her body in the morning but loosens up in a couple of minutes.  Has been taking Tylenol and Aleve with minimal relief in her knee.  Denies any history of rash, psoriasis, uveitis, dactylitis, enthesitis, GI or  symptoms.    9/8/2021:  She was seen back in July for left knee pain, she received a cortisone injection her symptoms have improved  She reports less stiffness in her knee, no swelling.  She did not do physical therapy  She reports having worsening right wrist pain with numbness and tingling in her hands.  Both hands are affected but right hand is worse than the left  Symptoms started about 1 week ago  Wrist feels slightly swollen  She wakes up with numbness and tingling in her right hand  She works at a [employer] and is  on the computer for most the day  She tried Aleve, Motrin and Tylenol with minimal relief  Denies any history of psoriasis, Crohn's or also colitis or lower back pain    10/8/2021:  She presents for follow-up of her right hand numbness and tingling and pain  EMG was done showing findings consistent with moderate carpal tunnel symptoms  She states her symptoms are more persistent now, has chronic numbness and tingling in her right hand  She has tried prednisone, naproxen and Tylenol with minimal relief  Continues to wear the wrist splints at night  Has noticed some numbness in her right fourth and fifth fingers but very mild    11/12/2021:   She been following with right hand pain due to carpal tunnel symptoms.  She will be having carpal tunnel surgery on December 10  Now having numbness and tingling in her left hand mostly in the fingers  Worse in the morning. Tingling is non-stop  EMG done of R hand but not the left     2/28/2022:   Presents for follow-up of left knee pain secondary to OA  Reports some swelling in her left knee and difficulty ambulating.  She had a cortisone injection back in July 2021 and it helped.  X-ray of the left knee showed minimal OA  She had right carpal tunnel release back in December 2021 and it helped significantly.  Left wrist is doing well too.  No numbness or tingling in the left wrist.  She is also following with allergist for chronic hives.  Sh is on antihistamines and prednisone as needed.  They are try to get her approved for Xolair monthly    3/5/2024:  Presents with worsening joint pain, last seen Feb 2022  Having worsening pain in the hands for the last 1-2 mos  Having swelling across the MCPs, hard to make full fist with both hands  Pain is worse in the morning   Had Right hand carpal tunnel release, it was good for a year but then started to have pain again  No numbness or tingling in the fingers  Having pain in both knees now, no swelling in the knees  Having stiffness in  the wrists and shoulders  No rash, lower back pain  Had a left breast biopsy but was benign     4/23/2024:  Presents for f/u of joint pain, mostly in the hands and the knees  When waking up in the morning hands are stiff  Had surgery in the right hand for carpal tunnel 2021, no n/t now but stiffness  Has swelling in the fingers  When put on prednisone it does help, when on prednisone 5 mg symptoms were stable but still has some pain and swelling  Has pin in both knees, left is worse than right    7/8/2024:  Presents for f/u of joint pain, found to have MCTD (+KAREN, RNP, SSA, inflammatory arthritis, one episode of RP)  She is now on methotrexate 6 pills weekly and FA   Overall the body pains have improved  Still having b/l hand pain, right worse than left   Able to make a fist  Knees are stable, both knees were injected with cortisone which helped   Would also get intermittent rashes but improved on methotrexate, she was on xolar in the past but not on it anymore  Mild dry eyes, no dry mouth              HISTORY:  Past Medical History:    Allergy    allergies    Amenorrhea    Asthma (HCC)    Decorative tattoo    Diabetes (HCC)    High blood pressure    High cholesterol    Hypercholesteremia    Unspecified disorder of neck    overactive gland in neck, surgical removal 1991      Social Hx Reviewed   Family Hx Reviewed     Medications (Active prior to today's visit):  Current Outpatient Medications   Medication Sig Dispense Refill    albuterol (PROAIR HFA) 108 (90 Base) MCG/ACT Inhalation Aero Soln Inhale 2 puffs into the lungs every 6 (six) hours as needed for Wheezing. 1 each 1    methotrexate 2.5 MG Oral Tab Start 4 pills weekly for 2 weeks then increase to 6 pills weekly 78 tablet 0    naproxen 500 MG Oral Tab Take 1 tablet (500 mg total) by mouth 2 (two) times daily with meals. 60 tablet 0    folic acid 1 MG Oral Tab Take 1 tablet (1 mg total) by mouth daily. 90 tablet 0    Dulaglutide (TRULICITY) 0.75 MG/0.5ML  Subcutaneous Solution Pen-injector Inject 0.75 mg into the skin once a week. 2 mL 3    hydroCHLOROthiazide 25 MG Oral Tab Take 1 tablet (25 mg total) by mouth daily. 90 tablet 1    zolpidem 10 MG Oral Tab Take 1 tablet (10 mg total) by mouth nightly as needed. 30 tablet 5    Naproxen Sodium (ALEVE) 220 MG Oral Cap Take 220 mg by mouth daily as needed.      valsartan 160 MG Oral Tab Take 1 tablet (160 mg total) by mouth daily. 30 tablet 11    Omeprazole 40 MG Oral Capsule Delayed Release Take 1 capsule (40 mg total) by mouth daily as needed. 30 capsule 5    naproxen 500 MG Oral Tab Take 1 tablet (500 mg total) by mouth 2 (two) times daily with meals. 60 tablet 0    predniSONE 5 MG Oral Tab Take 1 tablet (5 mg total) by mouth daily. (Patient not taking: Reported on 4/23/2024) 30 tablet 0    predniSONE 10 MG Oral Tab 30 mg daily x3 days then 20 mg daily x3 days then 10 mg daily x3 days then stop 20 tablet 0    HYDROcodone-acetaminophen 5-325 MG Oral Tab Take 1-2 tablets by mouth every 6 (six) hours as needed for Pain. (Patient not taking: Reported on 2/28/2024) 20 tablet 0    Glucose Blood (ONETOUCH VERIO) In Vitro Strip CHECK SUGARS TWICE DAILY 200 strip 0    acetaminophen 500 MG Oral Tab Take 2 tablets (1,000 mg total) by mouth every 6 (six) hours as needed for Pain.      EPINEPHrine (EPIPEN 2-YANY) 0.3 MG/0.3ML Injection Solution Auto-injector Inject IM in event of  allergic reaction 1 each 0    rosuvastatin 5 MG Oral Tab Take 1 tablet (5 mg total) by mouth nightly. (Patient not taking: Reported on 7/8/2024) 30 tablet 3    Omalizumab (XOLAIR) 150 MG/ML Subcutaneous Solution Prefilled Syringe Inject 300 mg into the skin every 28 days. (Patient not taking: Reported on 3/5/2024) 2 mL 2    Blood Glucose Monitoring Suppl (ONETOUCH VERIO FLEX SYSTEM) w/Device Does not apply Kit 1 each As Directed. To check sugars twice a day E 11.65 with insulin use 1 kit 0    OneTouch Delica Lancets 33G Does not apply Misc To check sugars  twice a day E 11.65 with insulin use 200 each 0    Blood Glucose Monitoring Suppl Does not apply Kit Use as directed to check blood glucose twice a day - fasting and before dinner 1 kit 0    Blood Glucose Monitoring Suppl Does not apply Device Use as directed to check blood glucose twice a day -fasting and before dinner 200 each 0    Microlet Lancets Does not apply Misc Use as directed to check blood glucose twice a day - fasting and before dinner 200 each 0     .cmed  Allergies:  Allergies   Allergen Reactions    Iodine (Topical) ANAPHYLAXIS, HIVES and UNKNOWN     Eyes swell, welts on body    Shellfish HIVES         ROS:   All other ROS are negative.     PHYSICAL EXAM:   GEN: AAOx3, NAD  HEENT: EOMI, PERRLA, no injection or icterus, oral mucosa moist, no oral lesions. No lymphadenopathy. No facial rash  CVS: RRR, no murmurs rubs or gallops. Equal 2+ distal pulses.   LUNGS: CTAB, no increased work of breathing  ABDOMEN:  soft NT/ND, +BS, no HSM  SKIN: No rashes or skin lesions. No nail findings  MSK:  Cervical spine: FROM  Hands: Swelling noted diffusely in her fingers, able to make a full fist but not strong  Wrist: FROM, no pain or swelling or warmth on palpation  Elbow: FROM, no pain or swelling or warmth on palpation  Shoulders: FROM, no pain or swelling or warmth on palpation  Hip: normal log roll, no lateral hip pain, CHRIS test negative b/l  Knees: L knee no swelling, nonTTP   Ankles: FROM, no pain or swelling or warmth on palpation  Feet: no pain with MTP squeeze, no toe swelling or pain or warmth on palpation with FROM  Spine: no lumbar or sacral pain on palpation.  NEURO: Cranial nerves II-XII intact grossly. 5/5 strength throughout in both upper and lower extremities, sensation intact.  PSYCH: normal mood       LABS:     Component      Latest Ref Rng & Units 6/17/2020   C-REACTIVE PROTEIN      <0.30 mg/dL 0.34 (H)   C-Citrullinated Peptide IgG AB      0.0 - 6.9 U/mL 1.5   SED RATE      0 - 20 mm/Hr 35 (H)    RHEUMATOID FACTOR      <15 IU/mL <10     Component      Latest Ref Rng 4/23/2024   Anti-SSA Antibody, IGG      <7 U/mL 9.2 (H)    Anti-SSB Antibody, IGG      <7 U/mL 5.0    Anti-Smith Antibody, IGG      <7 U/mL 3.5    Anti-U1RNP Antibody, IGG      <5 U/mL 221.0 (H)    Anti-RNP70 Antibody, IGG      <7 U/mL >218.0 (H)    Anti-Centromere Antibody, IGG      <7 U/mL 1.1    Anti-SCL70 Antibody, IGG      <7 U/mL 2.1    Anti-Alyx-1 Antibody, IGG      <7 U/mL 0.7    Quantiferon TB NIL      IU/mL 0.03    Quantiferon-TB1 Minus NIL      IU/mL 0.01    Quantiferon-TB2 Minus NIL      IU/mL 0.02    Quantiferon TB Mitogen minus NIL      IU/mL >10.00    Quantiferon TB Result      Negative  Negative    KAREN Titer/Pattern      <80  5120 !         Imaging:     US hands 7/2024:  1. Synovial thickening involving the MCP and IP joints of both hands.  There is mild hyperemia involving the right 1st MCP joint consistent with active synovitis.  No periarticular erosions.   2. Bifid right median nerve is otherwise unremarkable.     XR L knee:  CONCLUSION:   1. Minimal osteoarthritis.   2. Demineralization.   3. Joint effusion.      XR R knee:  Normal examination.     ASSESSMENT/PLAN:     MCTD (+KAREN, SSA, RNP, inflammatory arthritis and 1 episodes of Raynaud's in the past)  - Ultrasound of the hand showed active synovitis  - She is now on methotrexate 6 pills weekly and some of her joint pain is improved.  Continues to have pain in her hands though.  - She will take prednisone as needed for her joint pain which helps  - Chest x-ray was normal  - Plan to get echocardiogram  - Plan to add hydroxychloroquine 400 mg daily.  Risks/Benefits HCQ d/w patient which include: most common s/e are nausea and diarrhea, which improve over time. Less common s/e include rash, changes in skin pigment (such as darkening or dark spots), hair changes, muscle weakness and retinal toxicity. It is recommended that you have an eye exam within the first year of use, then  repeat yearly.  - Blood work today and every 3 to 4 months    B/L knee pain 2/2 OA  - X-ray of left knee did show evidence of mild osteoarthritis and joint effusion  - s/p cortisone injection July 2021 and April 2024    R hand carpal tunnel release  - EMG done consistent with moderate carpal tunnel in her right hand  - She has tried prednisone, naproxen and Tylenol with minimal relief  - s/p Right carpal tunnel sheath injection  - She had carpal tunnel release surgery in December 2021    Recent breast biopsy  - This was benign, biopsy showed residual papilloma    Pt will f/u 3-4 mos     There is a longitudinal care relationship with me, the care plan reflects the ongoing nature of the continuous relationship of care, and the medical record indicates that there is ongoing treatment of a serious/complex medical condition which I am currently managing.  is Applicable.     Sandi Bueno MD  7/8/2024  9:05 AM

## 2024-07-08 NOTE — PATIENT INSTRUCTIONS
You were seen today for mixed connective tissue disease  You are found to have a positive KAREN and positive antibodies called SSA and RNP that can be seen in mixed connective tissue disease  He also have joint pain with swelling which can be seen with this disease  Continue methotrexate 6 pills weekly and folic acid daily  We are adding hydroxychloroquine, take 2 pills a day  This should help keep your symptoms control and hopefully help your joint pain  Get blood work today  Also schedule the ultrasound of the heart  Follow-up in 4 months, try getting blood work before you see me at your next visit

## 2024-07-10 LAB
ALBUMIN SERPL ELPH-MCNC: 4.15 G/DL (ref 3.75–5.21)
ALBUMIN/GLOB SERPL: 1 {RATIO} (ref 1–2)
ALPHA1 GLOB SERPL ELPH-MCNC: 0.36 G/DL (ref 0.19–0.46)
ALPHA2 GLOB SERPL ELPH-MCNC: 0.68 G/DL (ref 0.48–1.05)
B-GLOBULIN SERPL ELPH-MCNC: 0.81 G/DL (ref 0.68–1.23)
GAMMA GLOB SERPL ELPH-MCNC: 2.3 G/DL (ref 0.62–1.7)
KAPPA LC FREE SER-MCNC: 8.32 MG/DL (ref 0.33–1.94)
KAPPA LC FREE/LAMBDA FREE SER NEPH: 1.96 {RATIO} (ref 0.26–1.65)
LAMBDA LC FREE SERPL-MCNC: 4.24 MG/DL (ref 0.57–2.63)
PROT SERPL-MCNC: 8.3 G/DL (ref 5.7–8.2)

## 2024-07-11 ENCOUNTER — LAB ENCOUNTER (OUTPATIENT)
Dept: LAB | Facility: REFERENCE LAB | Age: 54
End: 2024-07-11
Attending: INTERNAL MEDICINE
Payer: COMMERCIAL

## 2024-07-11 DIAGNOSIS — G89.29 CHRONIC PAIN OF BOTH KNEES: ICD-10-CM

## 2024-07-11 DIAGNOSIS — M79.641 BILATERAL HAND PAIN: ICD-10-CM

## 2024-07-11 DIAGNOSIS — M35.1 MCTD (MIXED CONNECTIVE TISSUE DISEASE) (HCC): ICD-10-CM

## 2024-07-11 DIAGNOSIS — M79.642 BILATERAL HAND PAIN: ICD-10-CM

## 2024-07-11 DIAGNOSIS — Z51.81 MEDICATION MONITORING ENCOUNTER: ICD-10-CM

## 2024-07-11 DIAGNOSIS — M25.562 CHRONIC PAIN OF BOTH KNEES: ICD-10-CM

## 2024-07-11 DIAGNOSIS — M25.561 CHRONIC PAIN OF BOTH KNEES: ICD-10-CM

## 2024-07-11 LAB
ALBUMIN SERPL-MCNC: 4.3 G/DL (ref 3.2–4.8)
ALT SERPL-CCNC: 16 U/L
AST SERPL-CCNC: 25 U/L (ref ?–34)
BASOPHILS # BLD AUTO: 0.01 X10(3) UL (ref 0–0.2)
BASOPHILS NFR BLD AUTO: 0.2 %
CREAT BLD-MCNC: 0.81 MG/DL
CRP SERPL-MCNC: <0.4 MG/DL (ref ?–1)
DEPRECATED RDW RBC AUTO: 45.6 FL (ref 35.1–46.3)
EGFRCR SERPLBLD CKD-EPI 2021: 88 ML/MIN/1.73M2 (ref 60–?)
EOSINOPHIL # BLD AUTO: 0.06 X10(3) UL (ref 0–0.7)
EOSINOPHIL NFR BLD AUTO: 1.3 %
ERYTHROCYTE [DISTWIDTH] IN BLOOD BY AUTOMATED COUNT: 12.9 % (ref 11–15)
ERYTHROCYTE [SEDIMENTATION RATE] IN BLOOD: 94 MM/HR
HCT VFR BLD AUTO: 37.4 %
HGB BLD-MCNC: 12.4 G/DL
IMM GRANULOCYTES # BLD AUTO: 0.01 X10(3) UL (ref 0–1)
IMM GRANULOCYTES NFR BLD: 0.2 %
LYMPHOCYTES # BLD AUTO: 0.97 X10(3) UL (ref 1–4)
LYMPHOCYTES NFR BLD AUTO: 20.3 %
MCH RBC QN AUTO: 32 PG (ref 26–34)
MCHC RBC AUTO-ENTMCNC: 33.2 G/DL (ref 31–37)
MCV RBC AUTO: 96.6 FL
MONOCYTES # BLD AUTO: 0.37 X10(3) UL (ref 0.1–1)
MONOCYTES NFR BLD AUTO: 7.8 %
NEUTROPHILS # BLD AUTO: 3.35 X10 (3) UL (ref 1.5–7.7)
NEUTROPHILS # BLD AUTO: 3.35 X10(3) UL (ref 1.5–7.7)
NEUTROPHILS NFR BLD AUTO: 70.2 %
PLATELET # BLD AUTO: 260 10(3)UL (ref 150–450)
RBC # BLD AUTO: 3.87 X10(6)UL
WBC # BLD AUTO: 4.8 X10(3) UL (ref 4–11)

## 2024-07-11 PROCEDURE — 84450 TRANSFERASE (AST) (SGOT): CPT

## 2024-07-11 PROCEDURE — 85652 RBC SED RATE AUTOMATED: CPT

## 2024-07-11 PROCEDURE — 86140 C-REACTIVE PROTEIN: CPT

## 2024-07-11 PROCEDURE — 82565 ASSAY OF CREATININE: CPT

## 2024-07-11 PROCEDURE — 85025 COMPLETE CBC W/AUTO DIFF WBC: CPT

## 2024-07-11 PROCEDURE — 36415 COLL VENOUS BLD VENIPUNCTURE: CPT

## 2024-07-11 PROCEDURE — 82040 ASSAY OF SERUM ALBUMIN: CPT

## 2024-07-11 PROCEDURE — 84460 ALANINE AMINO (ALT) (SGPT): CPT

## 2024-07-12 ENCOUNTER — PATIENT MESSAGE (OUTPATIENT)
Dept: RHEUMATOLOGY | Facility: CLINIC | Age: 54
End: 2024-07-12

## 2024-07-12 NOTE — TELEPHONE ENCOUNTER
From: Paula Brooks  To: Sandi Bueno  Sent: 7/12/2024 3:25 PM CDT  Subject: hydroxychlorcquine    I read that one of the side effects of this medication is hair loss. Thats a big concern for me, is there something else I could take or nor take this at all.

## 2024-07-23 NOTE — TELEPHONE ENCOUNTER
Requested Prescriptions     Pending Prescriptions Disp Refills    naproxen 500 MG Oral Tab 60 tablet 0     Sig: Take 1 tablet (500 mg total) by mouth 2 (two) times daily with meals.     Future Appointments   Date Time Provider Department Center   7/27/2024 10:40 AM [facility] CARD RM3 ECHO [facility] NI CARD EM [facility]   8/22/2024  8:30 [facility]   10/23/2024  8:40 AM Sandi Bueno MD [facility]   11/4/2024  8:00 AM [facility] RM1 [facility] EM [facility]   11/25/2024 10:00 AM Sandi Bueno MD ECCDANETTE [facility]     LOV: 7/8/24   Last Refilled:5/21/24 #60 0RF   Labs:  Component      Latest Ref Rn 7/11/2024   WBC      4.0 - 11.0 x10(3) uL 4.8    RBC      3.80 - 5.30 x10(6)uL 3.87    Hemoglobin      12.0 - 16.0 g/dL 12.4    Hematocrit      35.0 - 48.0 % 37.4    MCV      80.0 - 100.0 fL 96.6    MCH      26.0 - 34.0 pg 32.0    MCHC      31.0 - 37.0 g/dL 33.2    RDW-SD      35.1 - 46.3 fL 45.6    RDW      11.0 - 15.0 % 12.9    Platelet Count      150.0 - 450.0 10(3)uL 260.0    Prelim Neutrophil Abs      1.50 - 7.70 x10 (3) uL 3.35    Neutrophils Absolute      1.50 - 7.70 x10(3) uL 3.35    Lymphocytes Absolute      1.00 - 4.00 x10(3) uL 0.97 (L)    Monocytes Absolute      0.10 - 1.00 x10(3) uL 0.37    Eosinophils Absolute      0.00 - 0.70 x10(3) uL 0.06    Basophils Absolute      0.00 - 0.20 x10(3) uL 0.01    Immature Granulocyte Absolute      0.00 - 1.00 x10(3) uL 0.01    Neutrophils %      % 70.2    Lymphocytes %      % 20.3    Monocytes %      % 7.8    Eosinophils %      % 1.3    Basophils %      % 0.2    Immature Granulocyte %      % 0.2    PROTEIN, TOTAL      5.7 - 8.2 g/dL    Albumin      3.2 - 4.8 g/dL 4.3    ALPHA-1-GLOBULINS      0.19 - 0.46 g/dL    ALPHA-2-GLOBULINS      0.48 - 1.05 g/dL    BETA GLOBULINS      0.68 - 1.23 g/dL    GAMMA GLOBULINS      0.62 - 1.70 g/dL    ALBUMIN/GLOBULIN RATIO      1.00 - 2.00     SPE INTERPRETATION    IMMUNOFIXATION    KAPPA FREE LIGHT CHAIN      0.330 - 1.940  mg/dL    LAMBDA FREE LIGHT CHAIN      0.571 - 2.630 mg/dL    KAPPA/LAMBDA FLC RATIO      0.26 - 1.65     Reviewed By:    CREATININE      0.55 - 1.02 mg/dL 0.81    EGFR      >=60 mL/min/1.73m2 88    ALT (SGPT)      10 - 49 U/L 16    AST (SGOT)      <=34 U/L 25    C-REACTIVE PROTEIN      <1.00 mg/dL <0.40    SED RATE      0 - 30 mm/Hr 94 (H)       Legend:  (L) Low  (H) High    ASSESSMENT/PLAN:      MCTD (+KAREN, SSA, RNP, inflammatory arthritis and 1 episodes of Raynaud's in the past)  - Ultrasound of the hand showed active synovitis  - She is now on methotrexate 6 pills weekly and some of her joint pain is improved.  Continues to have pain in her hands though.  - She will take prednisone as needed for her joint pain which helps  - Chest x-ray was normal  - Plan to get echocardiogram  - Plan to add hydroxychloroquine 400 mg daily.  Risks/Benefits HCQ d/w patient which include: most common s/e are nausea and diarrhea, which improve over time. Less common s/e include rash, changes in skin pigment (such as darkening or dark spots), hair changes, muscle weakness and retinal toxicity. It is recommended that you have an eye exam within the first year of use, then repeat yearly.  - Blood work today and every 3 to 4 months     B/L knee pain 2/2 OA  - X-ray of left knee did show evidence of mild osteoarthritis and joint effusion  - s/p cortisone injection July 2021 and April 2024     R hand carpal tunnel release  - EMG done consistent with moderate carpal tunnel in her right hand  - She has tried prednisone, naproxen and Tylenol with minimal relief  - s/p Right carpal tunnel sheath injection  - She had carpal tunnel release surgery in December 2021     Recent breast biopsy  - This was benign, biopsy showed residual papilloma     Pt will f/u 3-4 mos      There is a longitudinal care relationship with me, the care plan reflects the ongoing nature of the continuous relationship of care, and the medical record indicates that there  is ongoing treatment of a serious/complex medical condition which I am currently managing.  is Applicable.      Sandi Bueno MD  7/8/2024  9:05 AM

## 2024-07-25 RX ORDER — NAPROXEN 500 MG/1
500 TABLET ORAL 2 TIMES DAILY WITH MEALS
Qty: 60 TABLET | Refills: 0 | Status: SHIPPED | OUTPATIENT
Start: 2024-07-25

## 2024-08-13 ENCOUNTER — TELEPHONE (OUTPATIENT)
Facility: CLINIC | Age: 54
End: 2024-08-13

## 2024-08-13 DIAGNOSIS — Z12.11 COLON CANCER SCREENING: Primary | ICD-10-CM

## 2024-08-13 NOTE — TELEPHONE ENCOUNTER
Patient has an office visit on 08/22/2024 with MATTHIAS Toney for a consult. There is no valid referral for an office visit. There is only referral for a telephone colon screening. Please advise

## 2024-09-04 ENCOUNTER — TELEPHONE (OUTPATIENT)
Dept: ENDOCRINOLOGY CLINIC | Facility: CLINIC | Age: 54
End: 2024-09-04

## 2024-09-04 NOTE — TELEPHONE ENCOUNTER
Current Outpatient Medications   Medication Sig Dispense Refill    Tirzepatide (MOUNJARO) 2.5 MG/0.5ML Subcutaneous Solution Pen-injector Inject 2.5 mg into the skin once a week. 2 mL 5            Message::Plan does not cover medication.Please call plan at [phone] to initiate prior authorization or call/fax pharmacy to change medication.Patient ID # is [id number]

## 2024-09-09 NOTE — TELEPHONE ENCOUNTER
Requested Prescriptions     Pending Prescriptions Disp Refills    naproxen 500 MG Oral Tab 60 tablet 0     Sig: Take 1 tablet (500 mg total) by mouth 2 (two) times daily with meals.     Future Appointments   Date Time Provider Department Center   9/17/2024  2:30 PM [facility] CARD RM2 ECHO [facility] NI CARD [facility]   10/23/2024  8:40 AM Sandi Bueno MD [facility]   11/4/2024  8:00 AM [facility]   11/25/2024 10:00 AM Sandi Bueno MD [facility]     LOV: 7/8/24   Last Refilled:7/25/24 #60 0RF   Labs:    Component      Latest Ref Rn 7/11/2024   WBC      4.0 - 11.0 x10(3) uL 4.8    RBC      3.80 - 5.30 x10(6)uL 3.87    Hemoglobin      12.0 - 16.0 g/dL 12.4    Hematocrit      35.0 - 48.0 % 37.4    MCV      80.0 - 100.0 fL 96.6    MCH      26.0 - 34.0 pg 32.0    MCHC      31.0 - 37.0 g/dL 33.2    RDW-SD      35.1 - 46.3 fL 45.6    RDW      11.0 - 15.0 % 12.9    Platelet Count      150.0 - 450.0 10(3)uL 260.0    Prelim Neutrophil Abs      1.50 - 7.70 x10 (3) uL 3.35    Neutrophils Absolute      1.50 - 7.70 x10(3) uL 3.35    Lymphocytes Absolute      1.00 - 4.00 x10(3) uL 0.97 (L)    Monocytes Absolute      0.10 - 1.00 x10(3) uL 0.37    Eosinophils Absolute      0.00 - 0.70 x10(3) uL 0.06    Basophils Absolute      0.00 - 0.20 x10(3) uL 0.01    Immature Granulocyte Absolute      0.00 - 1.00 x10(3) uL 0.01    Neutrophils %      % 70.2    Lymphocytes %      % 20.3    Monocytes %      % 7.8    Eosinophils %      % 1.3    Basophils %      % 0.2    Immature Granulocyte %      % 0.2    PROTEIN, TOTAL      5.7 - 8.2 g/dL    Albumin      3.2 - 4.8 g/dL 4.3    ALPHA-1-GLOBULINS      0.19 - 0.46 g/dL    ALPHA-2-GLOBULINS      0.48 - 1.05 g/dL    BETA GLOBULINS      0.68 - 1.23 g/dL    GAMMA GLOBULINS      0.62 - 1.70 g/dL    ALBUMIN/GLOBULIN RATIO      1.00 - 2.00     SPE INTERPRETATION    IMMUNOFIXATION    KAPPA FREE LIGHT CHAIN      0.330 - 1.940 mg/dL    LAMBDA FREE LIGHT CHAIN      0.571 - 2.630 mg/dL     KAPPA/LAMBDA FLC RATIO      0.26 - 1.65     Reviewed By:    CREATININE      0.55 - 1.02 mg/dL 0.81    EGFR      >=60 mL/min/1.73m2 88    ALT (SGPT)      10 - 49 U/L 16    AST (SGOT)      <=34 U/L 25    C-REACTIVE PROTEIN      <1.00 mg/dL <0.40    SED RATE      0 - 30 mm/Hr 94 (H)       Legend:  (L) Low  (H) High    ASSESSMENT/PLAN:      MCTD (+KAREN, SSA, RNP, inflammatory arthritis and 1 episodes of Raynaud's in the past)  - Ultrasound of the hand showed active synovitis  - She is now on methotrexate 6 pills weekly and some of her joint pain is improved.  Continues to have pain in her hands though.  - She will take prednisone as needed for her joint pain which helps  - Chest x-ray was normal  - Plan to get echocardiogram  - Plan to add hydroxychloroquine 400 mg daily.  Risks/Benefits HCQ d/w patient which include: most common s/e are nausea and diarrhea, which improve over time. Less common s/e include rash, changes in skin pigment (such as darkening or dark spots), hair changes, muscle weakness and retinal toxicity. It is recommended that you have an eye exam within the first year of use, then repeat yearly.  - Blood work today and every 3 to 4 months     B/L knee pain 2/2 OA  - X-ray of left knee did show evidence of mild osteoarthritis and joint effusion  - s/p cortisone injection July 2021 and April 2024     R hand carpal tunnel release  - EMG done consistent with moderate carpal tunnel in her right hand  - She has tried prednisone, naproxen and Tylenol with minimal relief  - s/p Right carpal tunnel sheath injection  - She had carpal tunnel release surgery in December 2021     Recent breast biopsy  - This was benign, biopsy showed residual papilloma     Pt will f/u 3-4 mos      There is a longitudinal care relationship with me, the care plan reflects the ongoing nature of the continuous relationship of care, and the medical record indicates that there is ongoing treatment of a serious/complex medical condition  which I am currently managing.  is Applicable.      Sandi Bueno MD  7/8/2024  9:05 AM

## 2024-09-10 RX ORDER — NAPROXEN 500 MG/1
500 TABLET ORAL 2 TIMES DAILY WITH MEALS
Qty: 60 TABLET | Refills: 0 | Status: SHIPPED | OUTPATIENT
Start: 2024-09-10

## 2024-09-10 RX ORDER — OMEPRAZOLE 40 MG/1
40 CAPSULE, DELAYED RELEASE ORAL DAILY
Qty: 90 CAPSULE | Refills: 3 | Status: SHIPPED | OUTPATIENT
Start: 2024-09-10

## 2024-09-10 NOTE — TELEPHONE ENCOUNTER
REFILL PASSED PER [facility] PROTOCOLS    Requested Prescriptions   Pending Prescriptions Disp Refills    OMEPRAZOLE 40 MG Oral Capsule Delayed Release [Pharmacy Med Name: OMEPRAZOLE 40MG CAPSULES] 30 capsule 5     Sig: TAKE 1 CAPSULE(40 MG) BY MOUTH DAILY AS NEEDED       Gastrointestional Medication Protocol Passed - 9/6/2024  8:02 AM        Passed - In person appointment or virtual visit in the past 12 mos or appointment in next 3 mos     Recent Outpatient Visits              2 months ago MCTD (mixed connective tissue disease) ([facility])    [facility], Sandi Hull MD    Office Visit    4 months ago Seronegative rheumatoid arthritis ([facility])    [facility], Sandi Hull MD    Office Visit    5 months ago Uncontrolled type 2 diabetes mellitus with hyperglycemia ([facility])    [facility] Marylin Harrington APRN    Office Visit    6 months ago Bilateral hand pain    [facility], Sandi Hull MD    Office Visit    6 months ago Intraductal papilloma of left breast    [facility] Sahra Dunn MD    Office Visit          Future Appointments         Provider Department Appt Notes    In 1 week [facility]2 [facility] Cardiodiagnostics     In 1 month Sandi Bueno MD [facility] oct 3 month    In 1 month [address] [facility]     In 2 months Sandi Bueno MD [facility] 4 month follow up-will need new referral                         Future Appointments         Provider Department Appt Notes    In 1 week [facility] CARD 2 [facility] Cardiodiagnostics     In 1 month Sandi Bueno MD [facility] oct 3 month    In 1 month [place] [id number]  [facility]     In 2 months Sandi Bueno MD [facility], [facility] 4 month follow up-will need new referral          Recent Outpatient Visits              2 months ago MCTD (mixed connective tissue disease) ([facility])    [facility], [facility] Sandi Hull MD    Office Visit    4 months ago Seronegative rheumatoid arthritis ([facility])    [facility], Sandi Hull MD    Office Visit    5 months ago Uncontrolled type 2 diabetes mellitus with hyperglycemia ([facility])    [facility], [facility] Marylin Harrington APRN    Office Visit    6 months ago Bilateral hand pain    [facility], Sandi Hull MD    Office Visit    6 months ago Intraductal papilloma of left breast    [facility], [facility] Sahra Dunn MD    Office Visit

## 2024-09-10 NOTE — TELEPHONE ENCOUNTER
Patient's dose was increased to Mounjaro 5 mg.   Please refer to mychart 9/6/24.   Please follow up in the next few days to ensure that PA went through as it was submitted via Epic system.  Thank you.

## 2024-09-14 DIAGNOSIS — M35.1 MCTD (MIXED CONNECTIVE TISSUE DISEASE) (HCC): ICD-10-CM

## 2024-09-14 NOTE — TELEPHONE ENCOUNTER
LOV: 7/8/24  Future Appointments   Date Time Provider Department Center   9/17/2024  2:30 PM [facility] CARD RM2 ECHO EM NI CARD EM [facility] [place]   10/23/2024  8:40 AM Sandi Bueno MD [facility]   11/4/2024  8:00 AM [facility] RM1 [facility] EM [facility]   11/25/2024 10:00 AM Sandi Bueno MD [facility]     Labs: AST 25 ALT 16  7/11/24  ASSESSMENT/PLAN:      MCTD (+KAREN, SSA, RNP, inflammatory arthritis and 1 episodes of Raynaud's in the past)  - Ultrasound of the hand showed active synovitis  - She is now on methotrexate 6 pills weekly and some of her joint pain is improved.  Continues to have pain in her hands though.  - She will take prednisone as needed for her joint pain which helps  - Chest x-ray was normal  - Plan to get echocardiogram  - Plan to add hydroxychloroquine 400 mg daily.  Risks/Benefits HCQ d/w patient which include: most common s/e are nausea and diarrhea, which improve over time. Less common s/e include rash, changes in skin pigment (such as darkening or dark spots), hair changes, muscle weakness and retinal toxicity. It is recommended that you have an eye exam within the first year of use, then repeat yearly.  - Blood work today and every 3 to 4 months     B/L knee pain 2/2 OA  - X-ray of left knee did show evidence of mild osteoarthritis and joint effusion  - s/p cortisone injection July 2021 and April 2024     R hand carpal tunnel release  - EMG done consistent with moderate carpal tunnel in her right hand  - She has tried prednisone, naproxen and Tylenol with minimal relief  - s/p Right carpal tunnel sheath injection  - She had carpal tunnel release surgery in December 2021     Recent breast biopsy  - This was benign, biopsy showed residual papilloma     Pt will f/u 3-4 mos      There is a longitudinal care relationship with me, the care plan reflects the ongoing nature of the continuous relationship of care, and the medical record indicates that there is ongoing treatment of a serious/complex  medical condition which I am currently managing.  is Applicable.      Sandi Bueno MD  7/8/2024  9:05 AM

## 2024-09-16 RX ORDER — METHOTREXATE 2.5 MG/1
15 TABLET ORAL WEEKLY
Qty: 78 TABLET | Refills: 0 | Status: SHIPPED | OUTPATIENT
Start: 2024-09-16

## 2024-09-17 ENCOUNTER — HOSPITAL ENCOUNTER (OUTPATIENT)
Dept: CV DIAGNOSTICS | Facility: HOSPITAL | Age: 54
Discharge: HOME | End: 2024-09-17
Attending: INTERNAL MEDICINE
Payer: COMMERCIAL

## 2024-09-17 DIAGNOSIS — M35.1 MCTD (MIXED CONNECTIVE TISSUE DISEASE) (HCC): ICD-10-CM

## 2024-09-17 PROCEDURE — 93306 TTE W/DOPPLER COMPLETE: CPT | Performed by: INTERNAL MEDICINE

## 2024-09-20 NOTE — TELEPHONE ENCOUNTER
Per Epic, PA is still in process. I called [employer] clinical review. Spoke with Jesus. The prior authorizations was approved. He ran a test claim and the 5 mg dose pays out if filled at the pharmacy.     Case number: [id number]  Dates approved: 8/12/24 - 9/11/2025    Per the representative, her plan only covers 1 month supply of the 5 mg dose. If the patient is going to be maintained on this dose, then another quantity limit override PA will need to be submitted.     Jennifer, do you plan on continuing to titrate up as long as patient tolerates?

## 2024-09-23 NOTE — TELEPHONE ENCOUNTER
Will likely address at office visit next month. Historically she has done well with lower dose of medication and has had too significant of weight loss and GI side effects at higher doses so unsure if we will continue to titrate dose but will discuss at next visit.

## 2024-10-14 ENCOUNTER — PATIENT MESSAGE (OUTPATIENT)
Dept: RHEUMATOLOGY | Facility: CLINIC | Age: 54
End: 2024-10-14

## 2024-10-21 ENCOUNTER — LAB ENCOUNTER (OUTPATIENT)
Dept: LAB | Facility: REFERENCE LAB | Age: 54
End: 2024-10-21
Attending: INTERNAL MEDICINE
Payer: COMMERCIAL

## 2024-10-21 DIAGNOSIS — M79.641 BILATERAL HAND PAIN: ICD-10-CM

## 2024-10-21 DIAGNOSIS — M79.642 BILATERAL HAND PAIN: ICD-10-CM

## 2024-10-21 DIAGNOSIS — M25.562 CHRONIC PAIN OF BOTH KNEES: ICD-10-CM

## 2024-10-21 DIAGNOSIS — M35.1 MCTD (MIXED CONNECTIVE TISSUE DISEASE) (HCC): ICD-10-CM

## 2024-10-21 DIAGNOSIS — Z51.81 MEDICATION MONITORING ENCOUNTER: ICD-10-CM

## 2024-10-21 DIAGNOSIS — G89.29 CHRONIC PAIN OF BOTH KNEES: ICD-10-CM

## 2024-10-21 DIAGNOSIS — M25.561 CHRONIC PAIN OF BOTH KNEES: ICD-10-CM

## 2024-10-21 LAB
ALBUMIN SERPL-MCNC: 4.4 G/DL (ref 3.2–4.8)
ALT SERPL-CCNC: 10 U/L
AST SERPL-CCNC: 27 U/L (ref ?–34)
BASOPHILS # BLD AUTO: 0.02 X10(3) UL (ref 0–0.2)
BASOPHILS NFR BLD AUTO: 0.4 %
CREAT BLD-MCNC: 0.83 MG/DL
CRP SERPL-MCNC: <0.4 MG/DL (ref ?–1)
DEPRECATED RDW RBC AUTO: 45.6 FL (ref 35.1–46.3)
EGFRCR SERPLBLD CKD-EPI 2021: 85 ML/MIN/1.73M2 (ref 60–?)
EOSINOPHIL # BLD AUTO: 0.06 X10(3) UL (ref 0–0.7)
EOSINOPHIL NFR BLD AUTO: 1.1 %
ERYTHROCYTE [DISTWIDTH] IN BLOOD BY AUTOMATED COUNT: 12.7 % (ref 11–15)
ERYTHROCYTE [SEDIMENTATION RATE] IN BLOOD: 76 MM/HR
HCT VFR BLD AUTO: 37.5 %
HGB BLD-MCNC: 12.4 G/DL
IMM GRANULOCYTES # BLD AUTO: 0.02 X10(3) UL (ref 0–1)
IMM GRANULOCYTES NFR BLD: 0.4 %
LYMPHOCYTES # BLD AUTO: 0.88 X10(3) UL (ref 1–4)
LYMPHOCYTES NFR BLD AUTO: 16.5 %
MCH RBC QN AUTO: 32.5 PG (ref 26–34)
MCHC RBC AUTO-ENTMCNC: 33.1 G/DL (ref 31–37)
MCV RBC AUTO: 98.4 FL
MONOCYTES # BLD AUTO: 0.44 X10(3) UL (ref 0.1–1)
MONOCYTES NFR BLD AUTO: 8.3 %
NEUTROPHILS # BLD AUTO: 3.91 X10 (3) UL (ref 1.5–7.7)
NEUTROPHILS # BLD AUTO: 3.91 X10(3) UL (ref 1.5–7.7)
NEUTROPHILS NFR BLD AUTO: 73.3 %
PLATELET # BLD AUTO: 230 10(3)UL (ref 150–450)
RBC # BLD AUTO: 3.81 X10(6)UL
WBC # BLD AUTO: 5.3 X10(3) UL (ref 4–11)

## 2024-10-21 PROCEDURE — 84460 ALANINE AMINO (ALT) (SGPT): CPT

## 2024-10-21 PROCEDURE — 82565 ASSAY OF CREATININE: CPT

## 2024-10-21 PROCEDURE — 84450 TRANSFERASE (AST) (SGOT): CPT

## 2024-10-21 PROCEDURE — 85652 RBC SED RATE AUTOMATED: CPT

## 2024-10-21 PROCEDURE — 86140 C-REACTIVE PROTEIN: CPT

## 2024-10-21 PROCEDURE — 36415 COLL VENOUS BLD VENIPUNCTURE: CPT

## 2024-10-21 PROCEDURE — 85025 COMPLETE CBC W/AUTO DIFF WBC: CPT

## 2024-10-21 PROCEDURE — 82040 ASSAY OF SERUM ALBUMIN: CPT

## 2024-10-23 ENCOUNTER — OFFICE VISIT (OUTPATIENT)
Dept: RHEUMATOLOGY | Facility: CLINIC | Age: 54
End: 2024-10-23
Payer: COMMERCIAL

## 2024-10-23 VITALS
WEIGHT: 189 LBS | SYSTOLIC BLOOD PRESSURE: 152 MMHG | DIASTOLIC BLOOD PRESSURE: 106 MMHG | BODY MASS INDEX: 29.66 KG/M2 | HEART RATE: 95 BPM | HEIGHT: 67 IN

## 2024-10-23 DIAGNOSIS — M25.562 CHRONIC PAIN OF BOTH KNEES: Primary | ICD-10-CM

## 2024-10-23 DIAGNOSIS — M35.1 MCTD (MIXED CONNECTIVE TISSUE DISEASE) (HCC): ICD-10-CM

## 2024-10-23 DIAGNOSIS — M25.561 CHRONIC PAIN OF BOTH KNEES: Primary | ICD-10-CM

## 2024-10-23 DIAGNOSIS — G89.29 CHRONIC PAIN OF BOTH KNEES: Primary | ICD-10-CM

## 2024-10-23 LAB
BASOPHILS NFR FLD: 0 %
COLOR FLD: YELLOW
EOSINOPHIL NFR FLD: 0 %
LYMPHOCYTES NFR FLD: 15 %
MONOS+MACROS NFR FLD: 63 %
NEUTROPHILS NFR FLD: 22 %
RBC # FLD: 118 /CUMM (ref ?–1)
TOTAL CELLS COUNTED FLD: 100
TOTAL CELLS COUNTED FLD: 406 /CUMM (ref ?–1)
WBC # FLD: 406 /CUMM

## 2024-10-23 PROCEDURE — 3080F DIAST BP >= 90 MM HG: CPT | Performed by: INTERNAL MEDICINE

## 2024-10-23 PROCEDURE — 20610 DRAIN/INJ JOINT/BURSA W/O US: CPT | Performed by: INTERNAL MEDICINE

## 2024-10-23 PROCEDURE — 3077F SYST BP >= 140 MM HG: CPT | Performed by: INTERNAL MEDICINE

## 2024-10-23 PROCEDURE — 99214 OFFICE O/P EST MOD 30 MIN: CPT | Performed by: INTERNAL MEDICINE

## 2024-10-23 PROCEDURE — 3008F BODY MASS INDEX DOCD: CPT | Performed by: INTERNAL MEDICINE

## 2024-10-23 RX ORDER — TRIAMCINOLONE ACETONIDE 40 MG/ML
40 INJECTION, SUSPENSION INTRA-ARTICULAR; INTRAMUSCULAR ONCE
Status: COMPLETED | OUTPATIENT
Start: 2024-10-23 | End: 2024-10-23

## 2024-10-23 RX ORDER — METHOTREXATE 2.5 MG/1
15 TABLET ORAL WEEKLY
Qty: 78 TABLET | Refills: 1 | Status: SHIPPED | OUTPATIENT
Start: 2024-10-23

## 2024-10-23 RX ORDER — HYDROXYCHLOROQUINE SULFATE 200 MG/1
400 TABLET, FILM COATED ORAL DAILY
Qty: 180 TABLET | Refills: 1 | Status: SHIPPED | OUTPATIENT
Start: 2024-10-23 | End: 2024-10-28

## 2024-10-23 NOTE — PATIENT INSTRUCTIONS
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

## 2024-10-23 NOTE — PROGRESS NOTES
Paula Brooks is a 53 year old female.    HPI:     Chief Complaint   Patient presents with    Mixed Connective Tissue Disease       I had the pleasure of seeing Paula Brooks on 10/23/2024 for follow up for MCTD (+KAREN, RNP, SSA, inflammatory arthritis)    Current Medications:  Tylenol arthritis or aleve as needed   Methotrexate 6 pills weekly- started 3/2024   mg daily- started 7/2024  Naproxen 500 mg BID  Prednisone as needed   Blood work:  +KAREN 1:5120 speckled pattern, +SSA, RNP  Neg RF, CCP, CRP  ESR 30  XR L knee: minimal OA, joint effusion   US hands: Synovial thickening involving the MCP and IP joints of both hands, mild hyperemia involving the right first MCP joint consistent with active synovitis, no erosions  CXR 5/2024: normal      Interval History:  This is a 50 yo F with no medical conditions presents with left knee pain.  She reports left knee pain starting a couple of weeks ago.  She has been walking 3 miles every other day and is noticed some increased knee pain and stiffness.  She also reports intermittent swelling in the suprapatellar region.  She states that the knee is not very painful but stiff and uncomfortable.  Denies any other joint pain or swelling.  Reports stiffness throughout her body in the morning but loosens up in a couple of minutes.  Has been taking Tylenol and Aleve with minimal relief in her knee.  Denies any history of rash, psoriasis, uveitis, dactylitis, enthesitis, GI or  symptoms.    9/8/2021:  She was seen back in July for left knee pain, she received a cortisone injection her symptoms have improved  She reports less stiffness in her knee, no swelling.  She did not do physical therapy  She reports having worsening right wrist pain with numbness and tingling in her hands.  Both hands are affected but right hand is worse than the left  Symptoms started about 1 week ago  Wrist feels slightly swollen  She wakes up with numbness and tingling in her right hand  She  works at a [employer] and is on the computer for most the day  She tried Aleve, Motrin and Tylenol with minimal relief  Denies any history of psoriasis, Crohn's or also colitis or lower back pain    10/8/2021:  She presents for follow-up of her right hand numbness and tingling and pain  EMG was done showing findings consistent with moderate carpal tunnel symptoms  She states her symptoms are more persistent now, has chronic numbness and tingling in her right hand  She has tried prednisone, naproxen and Tylenol with minimal relief  Continues to wear the wrist splints at night  Has noticed some numbness in her right fourth and fifth fingers but very mild    11/12/2021:   She been following with right hand pain due to carpal tunnel symptoms.  She will be having carpal tunnel surgery on December 10  Now having numbness and tingling in her left hand mostly in the fingers  Worse in the morning. Tingling is non-stop  EMG done of R hand but not the left     2/28/2022:   Presents for follow-up of left knee pain secondary to OA  Reports some swelling in her left knee and difficulty ambulating.  She had a cortisone injection back in July 2021 and it helped.  X-ray of the left knee showed minimal OA  She had right carpal tunnel release back in December 2021 and it helped significantly.  Left wrist is doing well too.  No numbness or tingling in the left wrist.  She is also following with allergist for chronic hives.  Sh is on antihistamines and prednisone as needed.  They are try to get her approved for Xolair monthly    3/5/2024:  Presents with worsening joint pain, last seen Feb 2022  Having worsening pain in the hands for the last 1-2 mos  Having swelling across the MCPs, hard to make full fist with both hands  Pain is worse in the morning   Had Right hand carpal tunnel release, it was good for a year but then started to have pain again  No numbness or tingling in the fingers  Having pain in both knees now, no swelling  in the knees  Having stiffness in the wrists and shoulders  No rash, lower back pain  Had a left breast biopsy but was benign     4/23/2024:  Presents for f/u of joint pain, mostly in the hands and the knees  When waking up in the morning hands are stiff  Had surgery in the right hand for carpal tunnel 2021, no n/t now but stiffness  Has swelling in the fingers  When put on prednisone it does help, when on prednisone 5 mg symptoms were stable but still has some pain and swelling  Has pin in both knees, left is worse than right    7/8/2024:  Presents for f/u of joint pain, found to have MCTD (+KAREN, RNP, SSA, inflammatory arthritis, one episode of RP)  She is now on methotrexate 6 pills weekly and FA   Overall the body pains have improved  Still having b/l hand pain, right worse than left   Able to make a fist  Knees are stable, both knees were injected with cortisone which helped   Would also get intermittent rashes but improved on methotrexate, she was on xolar in the past but not on it anymore  Mild dry eyes, no dry mouth    10/23/2024:  Presents for f/u of joint pain, found to have MCTD (+KAREN, RNP, SSA, inflammatory arthritis, one episode of RP)  She is now on methotrexate 6 pills weekly, FA and  mg daily  Has less pain and swelling in the hands  Has some stiffness in the morning   Also some numbness and tingling in left 4th and 5th fingers  Still has dry eyes  Has a rash on left elbow and under right breast, no papules   Had a recent echocardiogram   Having some nodules in the hands, seems like calcinosis cutis           HISTORY:  Past Medical History:    Allergy    allergies    Amenorrhea    Asthma (HCC)    Decorative tattoo    Diabetes (HCC)    High blood pressure    High cholesterol    Hypercholesteremia    Unspecified disorder of neck    overactive gland in neck, surgical removal 1991      Social Hx Reviewed   Family Hx Reviewed     Medications (Active prior to today's visit):  Current Outpatient  Medications   Medication Sig Dispense Refill    methotrexate 2.5 MG Oral Tab Take 6 tablets (15 mg total) by mouth once a week. 78 tablet 0    Omeprazole 40 MG Oral Capsule Delayed Release Take 1 capsule (40 mg total) by mouth daily. 90 capsule 3    naproxen 500 MG Oral Tab Take 1 tablet (500 mg total) by mouth 2 (two) times daily with meals. 60 tablet 0    Tirzepatide (MOUNJARO) 5 MG/0.5ML Subcutaneous Solution Pen-injector Inject 5 mg into the skin once a week. 2 mL 3    folic acid 1 MG Oral Tab Take 1 tablet (1 mg total) by mouth daily. 90 tablet 2    predniSONE 5 MG Oral Tab Take 1 tablet (5 mg total) by mouth daily. (Patient taking differently: Take 1 tablet (5 mg total) by mouth daily. As needed) 30 tablet 0    albuterol (PROAIR HFA) 108 (90 Base) MCG/ACT Inhalation Aero Soln Inhale 2 puffs into the lungs every 6 (six) hours as needed for Wheezing. 1 each 1    zolpidem 10 MG Oral Tab Take 1 tablet (10 mg total) by mouth nightly as needed. 30 tablet 5    Glucose Blood (ONETOUCH VERIO) In Vitro Strip CHECK SUGARS TWICE DAILY 200 strip 0    valsartan 160 MG Oral Tab Take 1 tablet (160 mg total) by mouth daily. 30 tablet 11    EPINEPHrine (EPIPEN 2-YANY) 0.3 MG/0.3ML Injection Solution Auto-injector Inject IM in event of  allergic reaction 1 each 0    Blood Glucose Monitoring Suppl (ONETOUCH VERIO FLEX SYSTEM) w/Device Does not apply Kit 1 each As Directed. To check sugars twice a day E 11.65 with insulin use 1 kit 0    hydroxychloroquine 200 MG Oral Tab Take 2 tablets (400 mg total) by mouth daily. (Patient not taking: Reported on 10/23/2024) 180 tablet 0    hydroCHLOROthiazide 25 MG Oral Tab Take 1 tablet (25 mg total) by mouth daily. (Patient not taking: Reported on 10/23/2024) 90 tablet 1    HYDROcodone-acetaminophen 5-325 MG Oral Tab Take 1-2 tablets by mouth every 6 (six) hours as needed for Pain. (Patient not taking: Reported on 2/28/2024) 20 tablet 0    Naproxen Sodium (ALEVE) 220 MG Oral Cap Take 220 mg by  mouth daily as needed. (Patient not taking: Reported on 10/23/2024)      acetaminophen 500 MG Oral Tab Take 2 tablets (1,000 mg total) by mouth every 6 (six) hours as needed for Pain.      rosuvastatin 5 MG Oral Tab Take 1 tablet (5 mg total) by mouth nightly. (Patient not taking: Reported on 10/23/2024) 30 tablet 3    OneTouch Delica Lancets 33G Does not apply Misc To check sugars twice a day E 11.65 with insulin use (Patient not taking: Reported on 10/23/2024) 200 each 0    Blood Glucose Monitoring Suppl Does not apply Kit Use as directed to check blood glucose twice a day - fasting and before dinner (Patient not taking: Reported on 10/23/2024) 1 kit 0    Blood Glucose Monitoring Suppl Does not apply Device Use as directed to check blood glucose twice a day -fasting and before dinner (Patient not taking: Reported on 10/23/2024) 200 each 0    Microlet Lancets Does not apply Misc Use as directed to check blood glucose twice a day - fasting and before dinner (Patient not taking: Reported on 10/23/2024) 200 each 0     .cmed  Allergies:  Allergies   Allergen Reactions    Iodine (Topical) ANAPHYLAXIS, HIVES and UNKNOWN     Eyes swell, welts on body    Shellfish HIVES         ROS:   All other ROS are negative.     PHYSICAL EXAM:   GEN: AAOx3, NAD  HEENT: EOMI, PERRLA, no injection or icterus, oral mucosa moist, no oral lesions. No lymphadenopathy. No facial rash  CVS: RRR, no murmurs rubs or gallops. Equal 2+ distal pulses.   LUNGS: CTAB, no increased work of breathing  ABDOMEN:  soft NT/ND, +BS, no HSM  SKIN: No rashes or skin lesions. No nail findings  MSK:  Cervical spine: FROM  Hands: Swelling noted diffusely in her fingers, able to make a full fist but not strong  Wrist: FROM, no pain or swelling or warmth on palpation  Elbow: FROM, no pain or swelling or warmth on palpation  Shoulders: FROM, no pain or swelling or warmth on palpation  Hip: normal log roll, no lateral hip pain, CHRIS test negative b/l  Knees: L knee  no swelling, nonTTP   Ankles: FROM, no pain or swelling or warmth on palpation  Feet: no pain with MTP squeeze, no toe swelling or pain or warmth on palpation with FROM  Spine: no lumbar or sacral pain on palpation.  NEURO: Cranial nerves II-XII intact grossly. 5/5 strength throughout in both upper and lower extremities, sensation intact.  PSYCH: normal mood       LABS:     Component      Latest Ref Rng & Units 6/17/2020   C-REACTIVE PROTEIN      <0.30 mg/dL 0.34 (H)   C-Citrullinated Peptide IgG AB      0.0 - 6.9 U/mL 1.5   SED RATE      0 - 20 mm/Hr 35 (H)   RHEUMATOID FACTOR      <15 IU/mL <10     Component      Latest Ref Rng 4/23/2024   Anti-SSA Antibody, IGG      <7 U/mL 9.2 (H)    Anti-SSB Antibody, IGG      <7 U/mL 5.0    Anti-Smith Antibody, IGG      <7 U/mL 3.5    Anti-U1RNP Antibody, IGG      <5 U/mL 221.0 (H)    Anti-RNP70 Antibody, IGG      <7 U/mL >218.0 (H)    Anti-Centromere Antibody, IGG      <7 U/mL 1.1    Anti-SCL70 Antibody, IGG      <7 U/mL 2.1    Anti-Alyx-1 Antibody, IGG      <7 U/mL 0.7    Quantiferon TB NIL      IU/mL 0.03    Quantiferon-TB1 Minus NIL      IU/mL 0.01    Quantiferon-TB2 Minus NIL      IU/mL 0.02    Quantiferon TB Mitogen minus NIL      IU/mL >10.00    Quantiferon TB Result      Negative  Negative    KAREN Titer/Pattern      <80  5120 !         Imaging:     US hands 7/2024:  1. Synovial thickening involving the MCP and IP joints of both hands.  There is mild hyperemia involving the right 1st MCP joint consistent with active synovitis.  No periarticular erosions.   2. Bifid right median nerve is otherwise unremarkable.     XR L knee:  CONCLUSION:   1. Minimal osteoarthritis.   2. Demineralization.   3. Joint effusion.      XR R knee:  Normal examination.     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  - Continue methotrexate 6 pills weekly, folic acid daily and hydroxychloroquine 400 mg daily  -Since adding hydroxychloroquine the pain and swelling in her hands have improved  - Chest x-ray was normal  - Echocardiogram showing normal EF, grade 1 diastolic dysfunction  - She was given information on ophthalmology to evaluate for Plaquenil toxicity  - Blood work today and every 3 to 4 months    B/L knee pain 2/2 OA  - X-ray of left knee did show evidence of mild osteoarthritis and joint effusion  - s/p cortisone injection July 2021 and April 2024  - B/L knees injected with 1 cc of lidocaine and 40 mg of Kenalog in sterile fashion.  Patient tolerated procedure well  - Left knee aspirated 10 cc of yellow synovial fluid removed and sent for cell count and crystal    R hand carpal tunnel release  - EMG done consistent with moderate carpal tunnel in her right hand  - She has tried prednisone, naproxen and Tylenol with minimal relief  - s/p Right carpal tunnel sheath injection  - She had carpal tunnel release surgery in December 2021    Recent breast biopsy  - This was benign, biopsy showed residual papilloma    Pt will f/u 3-4 mos     There is a longitudinal care relationship with me, the care plan reflects the ongoing nature of the continuous relationship of care, and the medical record indicates that there is ongoing treatment of a serious/complex medical condition which I am currently managing.  is Applicable.     Sandi Bueno MD  10/23/2024  8:40 AM

## 2024-10-23 NOTE — PROCEDURES
With paitent's consent, I injected pt's B/L knee with 1ml lidocaine 1 % and 1 ml kenalog 40. It was done under sterile technique using iodine and alcohol swabs and ethyl chloride was used as an anaesthetic spray. Pt.  tolerated it well.  Left knee also aspirated approximately 10 cc of yellow synovial fluid was removed

## 2024-10-28 ENCOUNTER — OFFICE VISIT (OUTPATIENT)
Dept: FAMILY MEDICINE CLINIC | Facility: CLINIC | Age: 54
End: 2024-10-28

## 2024-10-28 ENCOUNTER — OFFICE VISIT (OUTPATIENT)
Dept: ENDOCRINOLOGY CLINIC | Facility: CLINIC | Age: 54
End: 2024-10-28

## 2024-10-28 VITALS
WEIGHT: 191.19 LBS | BODY MASS INDEX: 30.01 KG/M2 | DIASTOLIC BLOOD PRESSURE: 93 MMHG | HEART RATE: 102 BPM | HEIGHT: 67 IN | SYSTOLIC BLOOD PRESSURE: 147 MMHG

## 2024-10-28 VITALS
WEIGHT: 193 LBS | HEART RATE: 121 BPM | SYSTOLIC BLOOD PRESSURE: 152 MMHG | DIASTOLIC BLOOD PRESSURE: 98 MMHG | BODY MASS INDEX: 30.29 KG/M2 | HEIGHT: 67 IN

## 2024-10-28 DIAGNOSIS — M35.1 MIXED CONNECTIVE TISSUE DISEASE (HCC): ICD-10-CM

## 2024-10-28 DIAGNOSIS — G47.9 SLEEP DISORDER: ICD-10-CM

## 2024-10-28 DIAGNOSIS — Z00.00 ROUTINE GENERAL MEDICAL EXAMINATION AT A HEALTH CARE FACILITY: Primary | ICD-10-CM

## 2024-10-28 DIAGNOSIS — E11.9 CONTROLLED TYPE 2 DIABETES MELLITUS WITHOUT COMPLICATION, WITHOUT LONG-TERM CURRENT USE OF INSULIN (HCC): Primary | ICD-10-CM

## 2024-10-28 DIAGNOSIS — Z12.11 COLON CANCER SCREENING: ICD-10-CM

## 2024-10-28 DIAGNOSIS — K21.9 GASTROESOPHAGEAL REFLUX DISEASE, UNSPECIFIED WHETHER ESOPHAGITIS PRESENT: ICD-10-CM

## 2024-10-28 PROBLEM — R73.01 IMPAIRED FASTING GLUCOSE: Status: RESOLVED | Noted: 2018-05-14 | Resolved: 2024-10-28

## 2024-10-28 LAB
GLUCOSE BLOOD: 106
HEMOGLOBIN A1C: 5.7 % (ref 4.3–5.6)
TEST STRIP LOT #: NORMAL NUMERIC

## 2024-10-28 PROCEDURE — 3008F BODY MASS INDEX DOCD: CPT

## 2024-10-28 PROCEDURE — 82947 ASSAY GLUCOSE BLOOD QUANT: CPT

## 2024-10-28 PROCEDURE — 3080F DIAST BP >= 90 MM HG: CPT

## 2024-10-28 PROCEDURE — 3044F HG A1C LEVEL LT 7.0%: CPT

## 2024-10-28 PROCEDURE — 83036 HEMOGLOBIN GLYCOSYLATED A1C: CPT

## 2024-10-28 PROCEDURE — 3077F SYST BP >= 140 MM HG: CPT

## 2024-10-28 PROCEDURE — 99214 OFFICE O/P EST MOD 30 MIN: CPT

## 2024-10-28 RX ORDER — PANTOPRAZOLE SODIUM 40 MG/1
40 TABLET, DELAYED RELEASE ORAL DAILY PRN
Qty: 30 TABLET | Refills: 5 | Status: SHIPPED | OUTPATIENT
Start: 2024-10-28

## 2024-10-28 RX ORDER — VALSARTAN 160 MG/1
160 TABLET ORAL DAILY
Qty: 30 TABLET | Refills: 11 | Status: SHIPPED | OUTPATIENT
Start: 2024-10-28

## 2024-10-28 RX ORDER — ZOLPIDEM TARTRATE 10 MG/1
10 TABLET ORAL NIGHTLY PRN
Qty: 30 TABLET | Refills: 5 | Status: SHIPPED | OUTPATIENT
Start: 2024-10-28

## 2024-10-28 NOTE — PROGRESS NOTES
Name: Paula Brooks  Date: 10/28/24    Referring Physician: Wayne Clark    HISTORY OF PRESENT ILLNESS   Paula Brooks is a 53 year old female who presents for follow up  for diabetes mellitus.     She was admitted to the hospital 5/10/2022 after presenting to the ED with polyuria, polydipsia and generalized weakness. She was found to have significant hyperglycemia. Of note she had been taking prednisone for chronic idiopathic urticaria, but is now off prednisone after transitioning to Xolair.     Underwent workup with rheumatology for joint pain- was diagnosed with mixed connective tissue disease. Was on prednisone intermittently but infrequently using.     Diabetes History:  Diagnosed- 5/2022    FH DM  -grandfather possibly    Prior glycohemoglobin were 13.2% 5/2022; 9.5% 6/2022; 6.4% 8/2022; 5.8% 12/2022; 5.6% 3/2023; 6.2% 8/2023; 5.9% 12/2023; 5.9% 4/2024; 5.7% POC today     Dietary compliance: Good     Recall:  Breakfast - yogurt with strawberries, raspberries and blueberries, juicing  Lunch - protein wraps with turkey, or sometimes just fruit at work during evening shift   Dinner-protein and some vegetables - more vegetables recently   Snack-nuts and sugar free jello , fruits   Beverages- water, ginger ale occasionally     Exercise: Yes- has been walking daily, active at work     Polyuria/polydipsia: No  Blurred vision: No     Episodes of hypoglycemia: None  Blood Glucose:    Checking 1-2 times per day- Reviewed logs:    119, 95, 106, 91, 92, 89, 73, 122, 92, 89, 112, 91, 81, 107, 94, 101, 102, 93, 99, 126, 95, 112, 103, 87, 110, 116, 139, 102      Previous DM medications  70/30 mixed insulin- stopped after weaned from oral steroids  Metformin- GI side effects and fatigue     Ozempic- significant GI side effects   Trulicity- stopped when transitioned to Ozempic     Current DM Regimen:  Mounjaro 5mg subcutaneous weekly     Modifying factors:  Medication adherence: yes   Recent steroids, illness or  infections: None       REVIEW OF SYSTEMS  Eyes: Diabetic retinopathy present: No             Most recent visit to eye doctor in last 12 months: Yes- 3/2024     CV: Cardiovascular disease present: No         Hypertension present: Yes         Hyperlipidemia present: Yes         Peripheral Vascular Disease present: No    : Nephropathy present: No    Neuro: Neuropathy present: No    Skin: Infection or ulceration: No    Osteoporosis: No    Thyroid disease: No      Medications:     Current Outpatient Medications:     Tirzepatide (MOUNJARO) 5 MG/0.5ML Subcutaneous Solution Pen-injector, Inject 5 mg into the skin once a week., Disp: 2 mL, Rfl: 3    hydroxychloroquine 200 MG Oral Tab, Take 2 tablets (400 mg total) by mouth daily., Disp: 180 tablet, Rfl: 1    methotrexate 2.5 MG Oral Tab, Take 6 tablets (15 mg total) by mouth once a week., Disp: 78 tablet, Rfl: 1    Omeprazole 40 MG Oral Capsule Delayed Release, Take 1 capsule (40 mg total) by mouth daily., Disp: 90 capsule, Rfl: 3    naproxen 500 MG Oral Tab, Take 1 tablet (500 mg total) by mouth 2 (two) times daily with meals., Disp: 60 tablet, Rfl: 0    folic acid 1 MG Oral Tab, Take 1 tablet (1 mg total) by mouth daily., Disp: 90 tablet, Rfl: 2    predniSONE 5 MG Oral Tab, Take 1 tablet (5 mg total) by mouth daily. (Patient taking differently: Take 1 tablet (5 mg total) by mouth daily. As needed), Disp: 30 tablet, Rfl: 0    albuterol (PROAIR HFA) 108 (90 Base) MCG/ACT Inhalation Aero Soln, Inhale 2 puffs into the lungs every 6 (six) hours as needed for Wheezing., Disp: 1 each, Rfl: 1    hydroCHLOROthiazide 25 MG Oral Tab, Take 1 tablet (25 mg total) by mouth daily. (Patient not taking: Reported on 10/23/2024), Disp: 90 tablet, Rfl: 1    zolpidem 10 MG Oral Tab, Take 1 tablet (10 mg total) by mouth nightly as needed., Disp: 30 tablet, Rfl: 5    HYDROcodone-acetaminophen 5-325 MG Oral Tab, Take 1-2 tablets by mouth every 6 (six) hours as needed for Pain. (Patient not  taking: Reported on 2/28/2024), Disp: 20 tablet, Rfl: 0    Glucose Blood (ONETOUCH VERIO) In Vitro Strip, CHECK SUGARS TWICE DAILY, Disp: 200 strip, Rfl: 0    Naproxen Sodium (ALEVE) 220 MG Oral Cap, Take 220 mg by mouth daily as needed. (Patient not taking: Reported on 10/23/2024), Disp: , Rfl:     acetaminophen 500 MG Oral Tab, Take 2 tablets (1,000 mg total) by mouth every 6 (six) hours as needed for Pain., Disp: , Rfl:     valsartan 160 MG Oral Tab, Take 1 tablet (160 mg total) by mouth daily., Disp: 30 tablet, Rfl: 11    EPINEPHrine (EPIPEN 2-YANY) 0.3 MG/0.3ML Injection Solution Auto-injector, Inject IM in event of  allergic reaction, Disp: 1 each, Rfl: 0    rosuvastatin 5 MG Oral Tab, Take 1 tablet (5 mg total) by mouth nightly. (Patient not taking: Reported on 10/23/2024), Disp: 30 tablet, Rfl: 3    Blood Glucose Monitoring Suppl (ONETOUCH VERIO FLEX SYSTEM) w/Device Does not apply Kit, 1 each As Directed. To check sugars twice a day E 11.65 with insulin use, Disp: 1 kit, Rfl: 0    OneTouch Delica Lancets 33G Does not apply Misc, To check sugars twice a day E 11.65 with insulin use (Patient not taking: Reported on 10/23/2024), Disp: 200 each, Rfl: 0    Blood Glucose Monitoring Suppl Does not apply Kit, Use as directed to check blood glucose twice a day - fasting and before dinner (Patient not taking: Reported on 10/23/2024), Disp: 1 kit, Rfl: 0    Blood Glucose Monitoring Suppl Does not apply Device, Use as directed to check blood glucose twice a day -fasting and before dinner (Patient not taking: Reported on 10/23/2024), Disp: 200 each, Rfl: 0    Microlet Lancets Does not apply Misc, Use as directed to check blood glucose twice a day - fasting and before dinner (Patient not taking: Reported on 10/23/2024), Disp: 200 each, Rfl: 0     Allergies:   Allergies   Allergen Reactions    Iodine (Topical) ANAPHYLAXIS, HIVES and UNKNOWN     Eyes swell, welts on body    Shellfish HIVES       Social History:   Social  History     Socioeconomic History    Marital status:    Tobacco Use    Smoking status: Never    Smokeless tobacco: Never   Vaping Use    Vaping status: Never Used   Substance and Sexual Activity    Alcohol use: Yes     Comment: occasionally    Drug use: No    Sexual activity: Yes     Partners: Male     Birth control/protection: Implant     Comment: ESSURE   Other Topics Concern    Caffeine Concern Yes     Comment: coffee, 1/2 cup daily    Right Handed Yes       Medical History:   Past Medical History:    Allergy    allergies    Amenorrhea    Asthma (HCC)    Decorative tattoo    Diabetes (HCC)    High blood pressure    High cholesterol    Hypercholesteremia    Unspecified disorder of neck    overactive gland in neck, surgical removal 1991       Surgical history:   Past Surgical History:   Procedure Laterality Date    Hysteroscopy, sterilization  2017    Essure    Incisional biopsy skin single lesion Left 09/23/2020    left shoulder lipoma    Sara biopsy stereo nodule 1 site left (cpt=19081)  12/08/2023    for focal asymetry; X clip    Sara localization wire 1 site left (cpt=19281) Left 02/22/2024    vision clip    Neck/chest procedure unlisted  1991    surgical removal (overactive gland in neck)    Needle biopsy left Left 12/20/2023    US [facility]B    Wrist arthroscop,release xvers lig Right 12/10/2021    Right endoscopic carpal tunnel release         PHYSICAL EXAM  Vitals:    10/28/24 1319   Weight: 193 lb (87.5 kg)   Height: 5' 7\" (1.702 m)           General Appearance:  alert, well developed, in no acute distress  Eyes:  normal conjunctivae, sclera, and normal pupils  Neck: Trachea midline: Normal  Back: no kyphosis or back tenderness  Respiratory:  clear to auscultation bilaterally  Cardiovascular:  regular rate, rhythm, , no murmurs, S3 or S4  Lymph Nodes:  No abnormal nodes noted  Musculoskeletal:  normal muscle strength and tone  Skin:  normal moisture and skin texture  Hair & Nails:  normal scalp hair      Hematologic:  no excessive bruising  Neuro:  sensory grossly intact and motor grossly intact.  Psychiatric:  oriented to time, self, and place  Nutritional:  no abnormal weight gain or loss  Feet: Bilateral barefoot skin diabetic exam is normal, visualized feet and the appearance is normal.  Bilateral monofilament/sensation of both feet is normal.  Pulsation pedal pulse exam of both lower legs/feet is normal as well.      ASSESSMENT/PLAN:    Diabetes mellitus type 2 Controlled   - HgA1c - 5.7% POC tody  -Congratulated patient on improved glycemic control   - Reviewed ABC's of diabetes  - Reviewed pathogenesis of diabetes.   -Congratulated patient on improved glycemic control.   - Reviewed importance of good glycemic control to prevent microvascular and macrovascular complications including nephropathy, neuropathy, retinopathy, and cardiovascular disease.  - Reviewed importance of SBGM- check glucose 2 times daily   - Reviewed target glucose goals for patient 80-120mg/dl fasting and <180 mg/dl post prandially   - Reviewed importance of following diabetic diet- recommended 135 grams of CHO per day or 45 grams per meal.   - Provided patient education materials  -SBGM logs show glucose levels at goal at home.     - Continue Mounjaro 5mg subcutaneous weekly. Reviewed side effects and risks vs benefits of medication.     -Lipids normal 8/2023- LDL- 111. Started on Rosuvastatin 5mg PO daily at last visit- repeat labs now.   -no nephropathy  -UTD with optho - last seen 4/2024  -normal foot exam today     Hypertension  -BP elevated at visit, slightly improved on repeat. on valsartan 160mg PO daily. I added hydrochlorothiazide at last visit but she admits she never started this medication  - Has appt with PCP for physical today- discussed adding hydrochlorothiazide or discuss today at visit with PCP      RTC in 6 months   10/28/24  ANNAMARIE Michael      A total of 30 minutes was spent on obtaining history,  reviewing pertinent imaging/labs and specialists notes, evaluating patient, providing multiple treatment options, reinforcing diet/exercise and compliance, and completing documentation.

## 2024-10-28 NOTE — PROGRESS NOTES
Subjective:   Paula Brooks is a 53 year old female who presents for Follow - Up and Blood Pressure       History/Other:    Chief Complaint Reviewed and Verified  No Further Nursing Notes to   Review  Allergies Reviewed  Medications Reviewed  Problem List Reviewed    OB Status Reviewed         Tobacco:  She has never smoked tobacco.    Current Outpatient Medications   Medication Sig Dispense Refill    valsartan 160 MG Oral Tab Take 1 tablet (160 mg total) by mouth daily. 30 tablet 11    pantoprazole 40 MG Oral Tab EC Take 1 tablet (40 mg total) by mouth daily as needed. 30 tablet 5    methotrexate 2.5 MG Oral Tab Take 6 tablets (15 mg total) by mouth once a week. 78 tablet 1    naproxen 500 MG Oral Tab Take 1 tablet (500 mg total) by mouth 2 (two) times daily with meals. 60 tablet 0    Tirzepatide (MOUNJARO) 5 MG/0.5ML Subcutaneous Solution Pen-injector Inject 5 mg into the skin once a week. 2 mL 3    folic acid 1 MG Oral Tab Take 1 tablet (1 mg total) by mouth daily. 90 tablet 2    albuterol (PROAIR HFA) 108 (90 Base) MCG/ACT Inhalation Aero Soln Inhale 2 puffs into the lungs every 6 (six) hours as needed for Wheezing. 1 each 1    hydroCHLOROthiazide 25 MG Oral Tab Take 1 tablet (25 mg total) by mouth daily. 90 tablet 1    zolpidem 10 MG Oral Tab Take 1 tablet (10 mg total) by mouth nightly as needed. 30 tablet 5    Glucose Blood (ONETOUCH VERIO) In Vitro Strip CHECK SUGARS TWICE DAILY 200 strip 0    EPINEPHrine (EPIPEN 2-YANY) 0.3 MG/0.3ML Injection Solution Auto-injector Inject IM in event of  allergic reaction 1 each 0    Blood Glucose Monitoring Suppl (ONETOUCH VERIO FLEX SYSTEM) w/Device Does not apply Kit 1 each As Directed. To check sugars twice a day E 11.65 with insulin use 1 kit 0    OneTouch Delica Lancets 33G Does not apply Misc To check sugars twice a day E 11.65 with insulin use 200 each 0    Blood Glucose Monitoring Suppl Does not apply Kit Use as directed to check blood glucose twice a day -  fasting and before dinner 1 kit 0    Blood Glucose Monitoring Suppl Does not apply Device Use as directed to check blood glucose twice a day -fasting and before dinner 200 each 0    Microlet Lancets Does not apply Misc Use as directed to check blood glucose twice a day - fasting and before dinner 200 each 0    rosuvastatin 5 MG Oral Tab Take 1 tablet (5 mg total) by mouth nightly. (Patient not taking: Reported on 7/8/2024) 30 tablet 3         Review of Systems:  Review of Systems   Constitutional: Negative.    HENT: Negative.     Eyes: Negative.    Respiratory: Negative.     Cardiovascular: Negative.    Gastrointestinal: Negative.    Genitourinary: Negative.    Musculoskeletal: Negative.    Skin: Negative.    Neurological: Negative.    Psychiatric/Behavioral: Negative.           Objective:   BP (!) 147/93   Pulse 102   Ht 5' 7\" (1.702 m)   Wt 191 lb 3.2 oz (86.7 kg)   LMP 11/15/2021 (Approximate)   BMI 29.95 kg/m²  Estimated body mass index is 29.95 kg/m² as calculated from the following:    Height as of this encounter: 5' 7\" (1.702 m).    Weight as of this encounter: 191 lb 3.2 oz (86.7 kg).  Physical Exam  Vitals reviewed.   Constitutional:       Appearance: Normal appearance. She is well-developed.   HENT:      Head: Normocephalic.      Right Ear: Hearing, tympanic membrane and ear canal normal.      Left Ear: Hearing, tympanic membrane and ear canal normal.      Nose: Nose normal.   Eyes:      Conjunctiva/sclera: Conjunctivae normal.      Pupils: Pupils are equal, round, and reactive to light.      Funduscopic exam:     Right eye: No hemorrhage or AV nicking.         Left eye: No hemorrhage or AV nicking.   Neck:      Thyroid: No thyroid mass or thyromegaly.   Cardiovascular:      Heart sounds: Normal heart sounds.   Pulmonary:      Breath sounds: Normal breath sounds.   Abdominal:      Tenderness: There is no abdominal tenderness.   Musculoskeletal:      Cervical back: Normal range of motion and neck  supple.      Lumbar back: Normal.   Lymphadenopathy:      Upper Body:      Right upper body: No supraclavicular adenopathy.      Left upper body: No supraclavicular adenopathy.   Skin:     Comments: No suspicious findings waist up exam   Neurological:      Mental Status: She is alert and oriented to person, place, and time.      Sensory: No sensory deficit.      Deep Tendon Reflexes: Reflexes are normal and symmetric.   Psychiatric:         Mood and Affect: Mood is not anxious or depressed.           Assessment & Plan:   1. Routine general medical examination at a health care facility (Primary)  -     CBC With Differential With Platelet; Future; Expected date: 10/28/2024  -     Comp Metabolic Panel (14); Future; Expected date: 10/28/2024  -     Lipid Panel; Future; Expected date: 10/28/2024  2. Colon cancer screening  -     [facility] GI Telephone Colon Screen  3. Gastroesophageal reflux disease, unspecified whether esophagitis present  4. Mixed connective tissue disease (HCC)  5. Sleep disorder  Other orders  -     Valsartan; Take 1 tablet (160 mg total) by mouth daily.  Dispense: 30 tablet; Refill: 11  -     Pantoprazole Sodium; Take 1 tablet (40 mg total) by mouth daily as needed.  Dispense: 30 tablet; Refill: 5  Labs ordered.  I would like for her to take hydrochlorothiazide daily with valsartan and see back in three weeks recheck BP.  Consider adding another 80mg or change in med.  Renewed and reviewed meds.       No follow-ups on file.    Wayne Clark DO, 10/28/2024, 6:10 PM

## 2024-10-31 RX ORDER — HYDROCHLOROTHIAZIDE 25 MG/1
25 TABLET ORAL DAILY
Qty: 90 TABLET | Refills: 1 | Status: SHIPPED | OUTPATIENT
Start: 2024-10-31

## 2024-10-31 NOTE — TELEPHONE ENCOUNTER
Endocrine Refill protocol for oral medications    Protocol Criteria:  PASSED  Reason: N/A    If below requirement is met, send a 90-day supply with 1 refill per provider protocol.    Verify appointment with Endocrinology completed in the last 6 months or scheduled in the next 3 months.    Last completed office visit: 10/28/2024 Marylin Harrington APRN   Next scheduled Follow up: no future appt

## 2024-11-04 ENCOUNTER — DOCUMENTATION ONLY (OUTPATIENT)
Dept: SURGERY | Facility: CLINIC | Age: 54
End: 2024-11-04

## 2024-11-04 ENCOUNTER — LAB ENCOUNTER (OUTPATIENT)
Dept: LAB | Facility: HOSPITAL | Age: 54
End: 2024-11-04
Attending: SURGERY
Payer: COMMERCIAL

## 2024-11-04 ENCOUNTER — HOSPITAL ENCOUNTER (OUTPATIENT)
Dept: MAMMOGRAPHY | Facility: HOSPITAL | Age: 54
Discharge: HOME | End: 2024-11-04
Attending: SURGERY
Payer: COMMERCIAL

## 2024-11-04 ENCOUNTER — PATIENT MESSAGE (OUTPATIENT)
Dept: FAMILY MEDICINE CLINIC | Facility: CLINIC | Age: 54
End: 2024-11-04

## 2024-11-04 DIAGNOSIS — E11.9 CONTROLLED TYPE 2 DIABETES MELLITUS WITHOUT COMPLICATION, WITHOUT LONG-TERM CURRENT USE OF INSULIN (HCC): ICD-10-CM

## 2024-11-04 DIAGNOSIS — D24.2 INTRADUCTAL PAPILLOMA OF LEFT BREAST: ICD-10-CM

## 2024-11-04 DIAGNOSIS — Z00.00 ROUTINE GENERAL MEDICAL EXAMINATION AT A HEALTH CARE FACILITY: ICD-10-CM

## 2024-11-04 LAB
ALBUMIN SERPL-MCNC: 5.1 G/DL (ref 3.2–4.8)
ALBUMIN/GLOB SERPL: 1.3 {RATIO} (ref 1–2)
ALP LIVER SERPL-CCNC: 71 U/L
ALT SERPL-CCNC: 21 U/L
ANION GAP SERPL CALC-SCNC: 7 MMOL/L (ref 0–18)
AST SERPL-CCNC: 33 U/L (ref ?–34)
BASOPHILS # BLD AUTO: 0.03 X10(3) UL (ref 0–0.2)
BASOPHILS NFR BLD AUTO: 0.4 %
BILIRUB SERPL-MCNC: 0.3 MG/DL (ref 0.3–1.2)
BUN BLD-MCNC: 14 MG/DL (ref 9–23)
BUN/CREAT SERPL: 14.7 (ref 10–20)
CALCIUM BLD-MCNC: 10 MG/DL (ref 8.7–10.4)
CHLORIDE SERPL-SCNC: 106 MMOL/L (ref 98–112)
CHOLEST SERPL-MCNC: 178 MG/DL (ref ?–200)
CO2 SERPL-SCNC: 25 MMOL/L (ref 21–32)
CREAT BLD-MCNC: 0.95 MG/DL
CREAT UR-SCNC: 186.7 MG/DL
DEPRECATED RDW RBC AUTO: 46.1 FL (ref 35.1–46.3)
EGFRCR SERPLBLD CKD-EPI 2021: 73 ML/MIN/1.73M2 (ref 60–?)
EOSINOPHIL # BLD AUTO: 0.06 X10(3) UL (ref 0–0.7)
EOSINOPHIL NFR BLD AUTO: 0.8 %
ERYTHROCYTE [DISTWIDTH] IN BLOOD BY AUTOMATED COUNT: 12.6 % (ref 11–15)
FASTING PATIENT LIPID ANSWER: YES
FASTING STATUS PATIENT QL REPORTED: YES
GLOBULIN PLAS-MCNC: 3.8 G/DL (ref 2–3.5)
GLUCOSE BLD-MCNC: 104 MG/DL (ref 70–99)
HCT VFR BLD AUTO: 41.9 %
HDLC SERPL-MCNC: 50 MG/DL (ref 40–59)
HGB BLD-MCNC: 13.8 G/DL
IMM GRANULOCYTES # BLD AUTO: 0.02 X10(3) UL (ref 0–1)
IMM GRANULOCYTES NFR BLD: 0.3 %
LDLC SERPL CALC-MCNC: 103 MG/DL (ref ?–100)
LYMPHOCYTES # BLD AUTO: 1.27 X10(3) UL (ref 1–4)
LYMPHOCYTES NFR BLD AUTO: 17.6 %
MCH RBC QN AUTO: 33 PG (ref 26–34)
MCHC RBC AUTO-ENTMCNC: 32.9 G/DL (ref 31–37)
MCV RBC AUTO: 100.2 FL
MICROALBUMIN UR-MCNC: 1.2 MG/DL
MICROALBUMIN/CREAT 24H UR-RTO: 6.4 UG/MG (ref ?–30)
MONOCYTES # BLD AUTO: 0.56 X10(3) UL (ref 0.1–1)
MONOCYTES NFR BLD AUTO: 7.8 %
NEUTROPHILS # BLD AUTO: 5.26 X10 (3) UL (ref 1.5–7.7)
NEUTROPHILS # BLD AUTO: 5.26 X10(3) UL (ref 1.5–7.7)
NEUTROPHILS NFR BLD AUTO: 73.1 %
NONHDLC SERPL-MCNC: 128 MG/DL (ref ?–130)
OSMOLALITY SERPL CALC.SUM OF ELEC: 287 MOSM/KG (ref 275–295)
PLATELET # BLD AUTO: 275 10(3)UL (ref 150–450)
POTASSIUM SERPL-SCNC: 4.7 MMOL/L (ref 3.5–5.1)
PROT SERPL-MCNC: 8.9 G/DL (ref 5.7–8.2)
RBC # BLD AUTO: 4.18 X10(6)UL
SODIUM SERPL-SCNC: 138 MMOL/L (ref 136–145)
TRIGL SERPL-MCNC: 140 MG/DL (ref 30–149)
VLDLC SERPL CALC-MCNC: 24 MG/DL (ref 0–30)
WBC # BLD AUTO: 7.2 X10(3) UL (ref 4–11)

## 2024-11-04 PROCEDURE — 77062 BREAST TOMOSYNTHESIS BI: CPT | Performed by: SURGERY

## 2024-11-04 PROCEDURE — 36415 COLL VENOUS BLD VENIPUNCTURE: CPT

## 2024-11-04 PROCEDURE — 80061 LIPID PANEL: CPT

## 2024-11-04 PROCEDURE — 82043 UR ALBUMIN QUANTITATIVE: CPT

## 2024-11-04 PROCEDURE — 85025 COMPLETE CBC W/AUTO DIFF WBC: CPT

## 2024-11-04 PROCEDURE — 80053 COMPREHEN METABOLIC PANEL: CPT

## 2024-11-04 PROCEDURE — 82570 ASSAY OF URINE CREATININE: CPT

## 2024-11-04 PROCEDURE — 77066 DX MAMMO INCL CAD BI: CPT | Performed by: SURGERY

## 2024-11-05 ENCOUNTER — NURSE TRIAGE (OUTPATIENT)
Dept: FAMILY MEDICINE CLINIC | Facility: CLINIC | Age: 54
End: 2024-11-05

## 2024-11-05 RX ORDER — ROSUVASTATIN CALCIUM 10 MG/1
10 TABLET, COATED ORAL NIGHTLY
Qty: 90 TABLET | Refills: 1 | Status: SHIPPED | OUTPATIENT
Start: 2024-11-05

## 2024-11-05 NOTE — TELEPHONE ENCOUNTER
Mychart message sent to patient instructing to call nurse triage to speak directly to a nurse regarding symptoms as per department protocol.    Future Appointments   Date Time Provider Department Center   11/18/2024  5:30 PM Wayne Clark DO [facility]   1/15/2025  9:20 AM Sandi Bueno MD [facility]   2/12/2025  9:20 AM [facility]

## 2024-11-05 NOTE — TELEPHONE ENCOUNTER
It could be a headache from her high blood pressure.  She can try taking them separate .  One in AM and one in PM at first to get use to it more.  I would like her to try a little longer like this week to see if HA better and tolerates.  If not will change med.

## 2024-11-05 NOTE — TELEPHONE ENCOUNTER
please see patient Mychart message below. Patient did call our office and she stated that she was in to see you on 10/28/24. You had recommend she take the hydrochlorothiazide and the valsartan together. Patient stated that she did on Tuesday she took it at 8 am and by 2 pm she started to get a headache that is a 7/10. She then has to take tylenol and lay down for 3 hours and it goes away. Patient stated that these headaches started since she started taking the blood pressure medication together. Patient strongly feels this is a side effect. Patient will like to know what to do next. Patient does not have a blood pressure machine to check her b/p. Please advise     November 4, 2024  Paula Brooks to P Em Rn Triage (supporting Wayne Clark DO)         11/4/24  3:40 PM  I take the BP medicines together now . But I’ve been getting the worst headaches.  Feels like a migraine .   Reason for Disposition   Unexplained headache that is present > 24 hours    Protocols used: Headache-A-OH

## 2024-11-18 ENCOUNTER — NURSE ONLY (OUTPATIENT)
Dept: FAMILY MEDICINE CLINIC | Facility: CLINIC | Age: 54
End: 2024-11-18

## 2024-11-18 ENCOUNTER — TELEPHONE (OUTPATIENT)
Dept: FAMILY MEDICINE CLINIC | Facility: CLINIC | Age: 54
End: 2024-11-18

## 2024-11-18 VITALS — DIASTOLIC BLOOD PRESSURE: 89 MMHG | SYSTOLIC BLOOD PRESSURE: 151 MMHG

## 2024-11-18 DIAGNOSIS — Z01.30 BLOOD PRESSURE CHECK: Primary | ICD-10-CM

## 2024-11-18 PROCEDURE — 3077F SYST BP >= 140 MM HG: CPT | Performed by: FAMILY MEDICINE

## 2024-11-18 PROCEDURE — 3079F DIAST BP 80-89 MM HG: CPT | Performed by: FAMILY MEDICINE

## 2024-11-18 RX ORDER — VALSARTAN 80 MG/1
80 TABLET ORAL DAILY
Qty: 30 TABLET | Refills: 0 | Status: SHIPPED | OUTPATIENT
Start: 2024-11-18

## 2024-11-22 RX ORDER — VALSARTAN 80 MG/1
TABLET ORAL
Qty: 90 TABLET | Refills: 0 | OUTPATIENT
Start: 2024-11-22

## 2024-11-22 RX ORDER — VALSARTAN 80 MG/1
80 TABLET ORAL DAILY
Qty: 90 TABLET | Refills: 0 | OUTPATIENT
Start: 2024-11-22

## 2024-11-22 NOTE — TELEPHONE ENCOUNTER
Please review. Protocol Failed; No Protocol    Please advise patient is requesting 90 day supply.    Requested Prescriptions   Pending Prescriptions Disp Refills    VALSARTAN 80 MG Oral Tab [Pharmacy Med Name: VALSARTAN 80MG TABLETS] 90 tablet 0     Sig: TAKE 1 TABLET(80 MG) BY MOUTH DAILY( IN ADDITION TO CURRENT DOSE)       Hypertension Medications Protocol Failed - 11/22/2024 12:35 PM        Failed - Last BP reading less than 140/90     BP Readings from Last 1 Encounters:   11/18/24 151/89               Passed - CMP or BMP in past 12 months        Passed - In person appointment or virtual visit in the past 12 mos or appointment in next 3 mos     Recent Outpatient Visits              4 days ago Blood pressure check    [facility]    Nurse Only    3 weeks ago Routine general medical examination at a health care facility    [facility] Wayne Clark DO    Office Visit    3 weeks ago Controlled type 2 diabetes mellitus without complication, without long-term current use of insulin ([facility])    [facility] Marylin Harrington APRN    Office Visit    1 month ago [facility]    Office Visit    4 months ago MCTD (mixed connective tissue disease) ([facility]          Future Appointments         Provider Department Appt Notes    In 1 month [facility]    In 2 months [facility] Annual visit                    Passed - EGFRCR or GFRAA > 50     GFR Evaluation  EGFRCR: 73 , resulted on 11/4/2024                 Future Appointments         Provider Department Appt Notes    In 1 month Myles  [facility]    In 2 months Yasmine Tracy MD [facility] - OB/GYN Annual visit          Recent Outpatient Visits              4 days ago Blood pressure check    [facility]    Nurse Only    3 weeks ago Routine general medical examination at a health care facility    [facility] Wayne Clark DO    Office Visit    3 weeks ago Controlled type 2 diabetes mellitus without complication, without long-term current use of insulin ([facility])    [facility] Marylin Harrington APRN    Office Visit    1 month ago [facility] both knees    [facility]    [facility] Visit    4 months ago MCTD (mixed connective tissue disease) ([facility]    [facility] Visit

## 2024-11-22 NOTE — TELEPHONE ENCOUNTER
90 day refill refused  Please call patient to schedule follow-up with Dr. Clark for further refills.

## 2024-11-23 ENCOUNTER — E-VISIT (OUTPATIENT)
Dept: TELEHEALTH | Age: 54
End: 2024-11-23
Payer: COMMERCIAL

## 2024-11-23 ENCOUNTER — TELEPHONE (OUTPATIENT)
Dept: FAMILY MEDICINE CLINIC | Facility: CLINIC | Age: 54
End: 2024-11-23

## 2024-11-23 DIAGNOSIS — Z02.9 ADMINISTRATIVE ENCOUNTER: Primary | ICD-10-CM

## 2024-11-23 RX ORDER — METHYLPREDNISOLONE 4 MG/1
TABLET ORAL
Qty: 1 EACH | Refills: 0 | Status: SHIPPED | OUTPATIENT
Start: 2024-11-23 | End: 2024-11-23 | Stop reason: CLARIF

## 2024-11-23 RX ORDER — CYCLOBENZAPRINE HCL 10 MG
10 TABLET ORAL 3 TIMES DAILY PRN
Qty: 15 TABLET | Refills: 0 | Status: SHIPPED | OUTPATIENT
Start: 2024-11-23 | End: 2024-11-23 | Stop reason: CLARIF

## 2024-11-23 NOTE — TELEPHONE ENCOUNTER
Patient was called and she stated that she is in [facility] now in [place]. No further action is needed.    Message # [id number]         2024 10:06a   [[email or web address]]  To:  SATURDAY TRIAGE RN  From:  ADRIANA HOWARD 74  Primary MD:  JANETTE  Phone#:  [phone]  ----------------------------------------------------------------------  PT IS OUT TOWN AND HURT HER BACK, PLS ADVISE

## 2024-11-23 NOTE — PROGRESS NOTES
Paula Brooks is a 53 year old female submitting e-visit for back pain.  HPI:   See answers to questionnaire and [employer] message exchange      Current Outpatient Medications   Medication Sig Dispense Refill    valsartan 80 MG Oral Tab Take 1 tablet (80 mg total) by mouth daily. 30 tablet 0    rosuvastatin 10 MG Oral Tab Take 1 tablet (10 mg total) by mouth nightly. 90 tablet 1    HYDROCHLOROTHIAZIDE 25 MG Oral Tab TAKE 1 TABLET(25 MG) BY MOUTH DAILY 90 tablet 1    valsartan 160 MG Oral Tab Take 1 tablet (160 mg total) by mouth daily. 30 tablet 11    pantoprazole 40 MG Oral Tab EC Take 1 tablet (40 mg total) by mouth daily as needed. 30 tablet 5    zolpidem 10 MG Oral Tab Take 1 tablet (10 mg total) by mouth nightly as needed. 30 tablet 5    methotrexate 2.5 MG Oral Tab Take 6 tablets (15 mg total) by mouth once a week. 78 tablet 1    naproxen 500 MG Oral Tab Take 1 tablet (500 mg total) by mouth 2 (two) times daily with meals. 60 tablet 0    Tirzepatide (MOUNJARO) 5 MG/0.5ML Subcutaneous Solution Pen-injector Inject 5 mg into the skin once a week. 2 mL 3    folic acid 1 MG Oral Tab Take 1 tablet (1 mg total) by mouth daily. 90 tablet 2    albuterol (PROAIR HFA) 108 (90 Base) MCG/ACT Inhalation Aero Soln Inhale 2 puffs into the lungs every 6 (six) hours as needed for Wheezing. 1 each 1    Glucose Blood (ONETOUCH VERIO) In Vitro Strip CHECK SUGARS TWICE DAILY 200 strip 0    EPINEPHrine (EPIPEN 2-YANY) 0.3 MG/0.3ML Injection Solution Auto-injector Inject IM in event of  allergic reaction 1 each 0    Blood Glucose Monitoring Suppl (ONETOUCH VERIO FLEX SYSTEM) w/Device Does not apply Kit 1 each As Directed. To check sugars twice a day E 11.65 with insulin use 1 kit 0    OneTouch Delica Lancets 33G Does not apply Misc To check sugars twice a day E 11.65 with insulin use 200 each 0    Blood Glucose Monitoring Suppl Does not apply Kit Use as directed to check blood glucose twice a day - fasting and before dinner 1 kit 0     Blood Glucose Monitoring Suppl Does not apply Device Use as directed to check blood glucose twice a day -fasting and before dinner 200 each 0    Microlet Lancets Does not apply Misc Use as directed to check blood glucose twice a day - fasting and before dinner 200 each 0      Past Medical History:    Allergy    allergies    Amenorrhea    Asthma (HCC)    Decorative tattoo    Diabetes (HCC)    High blood pressure    High cholesterol    Hypercholesteremia    Unspecified disorder of neck    overactive gland in neck, surgical removal 1991      Past Surgical History:   Procedure Laterality Date    Hysteroscopy, sterilization  2017    Essure    Incisional biopsy skin single lesion Left 09/23/2020    left shoulder lipoma    Sara biopsy stereo nodule 1 site left (cpt=19081)  12/08/2023    for focal asymetry; X clip    Sara localization wire 1 site left (cpt=19281) Left 02/22/2024    vision clip    Neck/chest procedure unlisted  1991    surgical removal (overactive gland in neck)    Needle biopsy left Left 12/20/2023    US [facility]    Wrist arthroscop,release xvers lig Right 12/10/2021    Right endoscopic carpal tunnel release      Family History   Problem Relation Age of Onset    Diabetes Mother     Cancer Mother         lung CA    Heart Disease Father     Allergies Son     No Known Problems Sister     No Known Problems Brother     No Known Problems Brother     No Known Problems Brother       Social History:  Social History     Socioeconomic History    Marital status:    Tobacco Use    Smoking status: Never    Smokeless tobacco: Never   Vaping Use    Vaping status: Never Used   Substance and Sexual Activity    Alcohol use: Yes     Comment: occasionally    Drug use: No    Sexual activity: Yes     Partners: Male     Birth control/protection: Implant     Comment: ESSURE   Other Topics Concern    Caffeine Concern Yes     Comment: coffee, 1/2 cup daily    Right Handed Yes         ASSESSMENT AND PLAN:       Diagnoses and all  orders for this visit:    Acute low back pain, unspecified back pain laterality, unspecified whether sciatica present  -     Discontinue: methylPREDNISolone (MEDROL) 4 MG Oral Tablet Therapy Pack; As directed.  -     Discontinue: cyclobenzaprine 10 MG Oral Tab; Take 1 tablet (10 mg total) by mouth 3 (three) times daily as needed for Muscle spasms.      Meds sent to pharmacy and then noted pharmacy located in [facility]; closed until later today.  LVM to cancel scripts.   Referred to local [facility] for treatment  Refer to [employer] message exchange for specific patient instructions

## 2024-11-24 ENCOUNTER — E-VISIT (OUTPATIENT)
Dept: TELEHEALTH | Age: 54
End: 2024-11-24
Payer: COMMERCIAL

## 2024-11-24 DIAGNOSIS — M54.50 SEVERE LOW BACK PAIN: Primary | ICD-10-CM

## 2024-11-24 NOTE — PROGRESS NOTES
Paula Brooks is a 53 year old female submitting e-visit for severe low back pain.  HPI:   See answers to questionnaire and [employer] message exchange      Current Outpatient Medications   Medication Sig Dispense Refill    valsartan 80 MG Oral Tab Take 1 tablet (80 mg total) by mouth daily. 30 tablet 0    rosuvastatin 10 MG Oral Tab Take 1 tablet (10 mg total) by mouth nightly. 90 tablet 1    HYDROCHLOROTHIAZIDE 25 MG Oral Tab TAKE 1 TABLET(25 MG) BY MOUTH DAILY 90 tablet 1    valsartan 160 MG Oral Tab Take 1 tablet (160 mg total) by mouth daily. 30 tablet 11    pantoprazole 40 MG Oral Tab EC Take 1 tablet (40 mg total) by mouth daily as needed. 30 tablet 5    zolpidem 10 MG Oral Tab Take 1 tablet (10 mg total) by mouth nightly as needed. 30 tablet 5    methotrexate 2.5 MG Oral Tab Take 6 tablets (15 mg total) by mouth once a week. 78 tablet 1    naproxen 500 MG Oral Tab Take 1 tablet (500 mg total) by mouth 2 (two) times daily with meals. 60 tablet 0    Tirzepatide (MOUNJARO) 5 MG/0.5ML Subcutaneous Solution Pen-injector Inject 5 mg into the skin once a week. 2 mL 3    folic acid 1 MG Oral Tab Take 1 tablet (1 mg total) by mouth daily. 90 tablet 2    albuterol (PROAIR HFA) 108 (90 Base) MCG/ACT Inhalation Aero Soln Inhale 2 puffs into the lungs every 6 (six) hours as needed for Wheezing. 1 each 1    Glucose Blood (ONETOUCH VERIO) In Vitro Strip CHECK SUGARS TWICE DAILY 200 strip 0    EPINEPHrine (EPIPEN 2-YANY) 0.3 MG/0.3ML Injection Solution Auto-injector Inject IM in event of  allergic reaction 1 each 0    Blood Glucose Monitoring Suppl (ONETOUCH VERIO FLEX SYSTEM) w/Device Does not apply Kit 1 each As Directed. To check sugars twice a day E 11.65 with insulin use 1 kit 0    OneTouch Delica Lancets 33G Does not apply Misc To check sugars twice a day E 11.65 with insulin use 200 each 0    Blood Glucose Monitoring Suppl Does not apply Kit Use as directed to check blood glucose twice a day - fasting and before dinner  1 kit 0    Blood Glucose Monitoring Suppl Does not apply Device Use as directed to check blood glucose twice a day -fasting and before dinner 200 each 0    Microlet Lancets Does not apply Misc Use as directed to check blood glucose twice a day - fasting and before dinner 200 each 0      Past Medical History:    Allergy    allergies    Amenorrhea    Asthma (HCC)    Decorative tattoo    Diabetes (HCC)    High blood pressure    High cholesterol    Hypercholesteremia    Unspecified disorder of neck    overactive gland in neck, surgical removal 1991      Past Surgical History:   Procedure Laterality Date    Hysteroscopy, sterilization  2017    Essure    Incisional biopsy skin single lesion Left 09/23/2020    left shoulder lipoma    Sara biopsy stereo nodule 1 site left (cpt=19081)  12/08/2023    for focal asymetry; X clip    Sara localization wire 1 site left (cpt=19281) Left 02/22/2024    vision clip    Neck/chest procedure unlisted  1991    surgical removal (overactive gland in neck)    Needle biopsy left Left 12/20/2023    US CNB    Wrist arthroscop,release xvers lig Right 12/10/2021    Right endoscopic carpal tunnel release      Family History   Problem Relation Age of Onset    Diabetes Mother     Cancer Mother         lung CA    Heart Disease Father     Allergies Son     No Known Problems Sister     No Known Problems Brother     No Known Problems Brother     No Known Problems Brother       Social History:  Social History     Socioeconomic History    Marital status:    Tobacco Use    Smoking status: Never    Smokeless tobacco: Never   Vaping Use    Vaping status: Never Used   Substance and Sexual Activity    Alcohol use: Yes     Comment: occasionally    Drug use: No    Sexual activity: Yes     Partners: Male     Birth control/protection: Implant     Comment: ESSURE   Other Topics Concern    Caffeine Concern Yes     Comment: coffee, 1/2 cup daily    Right Handed Yes         ASSESSMENT AND PLAN:       Diagnoses  and all orders for this visit:    Severe low back pain      Seen at [facility] yesterday and treated with tylenol and Norflex.    Referred to IC today if sx are severe or PCP tomorrow if no improvement   Refer to [employer] message exchange for specific patient instructions    Duration of  the service:  6 minutes

## 2024-11-25 ENCOUNTER — NURSE TRIAGE (OUTPATIENT)
Dept: FAMILY MEDICINE CLINIC | Facility: CLINIC | Age: 54
End: 2024-11-25

## 2024-11-25 ENCOUNTER — HOSPITAL ENCOUNTER (EMERGENCY)
Facility: HOSPITAL | Age: 54
Discharge: HOME | End: 2024-11-25
Attending: EMERGENCY MEDICINE
Payer: COMMERCIAL

## 2024-11-25 VITALS
HEIGHT: 67 IN | DIASTOLIC BLOOD PRESSURE: 69 MMHG | OXYGEN SATURATION: 95 % | BODY MASS INDEX: 29.51 KG/M2 | SYSTOLIC BLOOD PRESSURE: 132 MMHG | HEART RATE: 66 BPM | TEMPERATURE: 98 F | WEIGHT: 188 LBS | RESPIRATION RATE: 16 BRPM

## 2024-11-25 DIAGNOSIS — M54.31 SCIATICA OF RIGHT SIDE: Primary | ICD-10-CM

## 2024-11-25 PROCEDURE — 96374 THER/PROPH/DIAG INJ IV PUSH: CPT

## 2024-11-25 PROCEDURE — 99284 EMERGENCY DEPT VISIT MOD MDM: CPT

## 2024-11-25 PROCEDURE — 96375 TX/PRO/DX INJ NEW DRUG ADDON: CPT

## 2024-11-25 RX ORDER — METHYLPREDNISOLONE 4 MG/1
TABLET ORAL
Qty: 1 EACH | Refills: 0 | Status: SHIPPED | OUTPATIENT
Start: 2024-11-25

## 2024-11-25 RX ORDER — ORPHENADRINE CITRATE 100 MG/1
100 TABLET ORAL 2 TIMES DAILY
Qty: 6 EACH | Refills: 0 | Status: SHIPPED | OUTPATIENT
Start: 2024-11-25 | End: 2024-11-28

## 2024-11-25 RX ORDER — HYDROCODONE BITARTRATE AND ACETAMINOPHEN 5; 325 MG/1; MG/1
1 TABLET ORAL EVERY 6 HOURS PRN
Qty: 10 TABLET | Refills: 0 | Status: SHIPPED | OUTPATIENT
Start: 2024-11-25 | End: 2024-12-02

## 2024-11-25 RX ORDER — KETOROLAC TROMETHAMINE 10 MG/1
10 TABLET, FILM COATED ORAL EVERY 6 HOURS PRN
Qty: 20 TABLET | Refills: 0 | Status: SHIPPED | OUTPATIENT
Start: 2024-11-25 | End: 2024-12-02

## 2024-11-25 RX ORDER — DEXAMETHASONE SODIUM PHOSPHATE 10 MG/ML
8 INJECTION, SOLUTION INTRAMUSCULAR; INTRAVENOUS ONCE
Status: COMPLETED | OUTPATIENT
Start: 2024-11-25 | End: 2024-11-25

## 2024-11-25 RX ORDER — MORPHINE SULFATE 4 MG/ML
4 INJECTION, SOLUTION INTRAMUSCULAR; INTRAVENOUS ONCE
Status: COMPLETED | OUTPATIENT
Start: 2024-11-25 | End: 2024-11-25

## 2024-11-25 NOTE — TELEPHONE ENCOUNTER
Action Requested: Summary for Provider     []  Critical Lab, Recommendations Needed  [] Need Additional Advice  [x]   FYI    []   Need Orders  [] Need Medications Sent to Pharmacy  []  Other     SUMMARY: Spoke with patient states she pulled something in her lower back on Friday (11/22)  while pulling a heavy suitcase down some stairs. She says since then her pain has worsened- difficult to stand or walk at all, feels better when laying down. She states pain is 10/10 radiates from lower back to right upper thigh. Has tried taking otc pain medication like Tylenol, ibuprofren, naproxen, was also giving a prescription for Orphenadrine ER 100mg from a nurse practitioner in [place] while she was out of town but not helping. States she is going to [facility] , let her know we would let Dr. Bush know.    Reason for call: No chief complaint on file.  Onset: 3 days ago      Reason for Disposition   Pain radiates into the thigh or further down the leg, and in both legs    Protocols used: Back Pain-A-OH

## 2024-11-25 NOTE — ED INITIAL ASSESSMENT (HPI)
Pt presents to the ED with c/o right sided back pain that radiates to her right upper leg. Pt reports the pain started when she was puling a luggage on Friday. Pt taking muscle relaxers and getting no pain relief.
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
## 2024-11-26 ENCOUNTER — PATIENT MESSAGE (OUTPATIENT)
Dept: FAMILY MEDICINE CLINIC | Facility: CLINIC | Age: 54
End: 2024-11-26

## 2024-11-26 DIAGNOSIS — M54.31 SCIATICA OF RIGHT SIDE: Primary | ICD-10-CM

## 2024-12-03 ENCOUNTER — OFFICE VISIT (OUTPATIENT)
Dept: FAMILY MEDICINE CLINIC | Facility: CLINIC | Age: 54
End: 2024-12-03
Payer: COMMERCIAL

## 2024-12-03 VITALS
BODY MASS INDEX: 29.19 KG/M2 | WEIGHT: 186 LBS | DIASTOLIC BLOOD PRESSURE: 86 MMHG | OXYGEN SATURATION: 96 % | HEIGHT: 67 IN | HEART RATE: 106 BPM | SYSTOLIC BLOOD PRESSURE: 133 MMHG

## 2024-12-03 DIAGNOSIS — M54.50 LUMBAR BACK PAIN: Primary | ICD-10-CM

## 2024-12-03 DIAGNOSIS — I10 ESSENTIAL HYPERTENSION: ICD-10-CM

## 2024-12-03 PROCEDURE — 3075F SYST BP GE 130 - 139MM HG: CPT | Performed by: FAMILY MEDICINE

## 2024-12-03 PROCEDURE — 3079F DIAST BP 80-89 MM HG: CPT | Performed by: FAMILY MEDICINE

## 2024-12-03 PROCEDURE — 3008F BODY MASS INDEX DOCD: CPT | Performed by: FAMILY MEDICINE

## 2024-12-03 PROCEDURE — 3061F NEG MICROALBUMINURIA REV: CPT | Performed by: FAMILY MEDICINE

## 2024-12-03 PROCEDURE — 99214 OFFICE O/P EST MOD 30 MIN: CPT | Performed by: FAMILY MEDICINE

## 2024-12-03 RX ORDER — VALSARTAN 80 MG/1
80 TABLET ORAL DAILY
Qty: 30 TABLET | Refills: 11 | Status: SHIPPED | OUTPATIENT
Start: 2024-12-03

## 2024-12-03 RX ORDER — NAPROXEN 500 MG/1
500 TABLET ORAL 2 TIMES DAILY WITH MEALS
Qty: 60 TABLET | Refills: 0 | Status: SHIPPED | OUTPATIENT
Start: 2024-12-03

## 2024-12-03 NOTE — PROGRESS NOTES
Subjective:   Paula Brooks is a 54 year old female who presents for Follow - Up (Blood pressure follow up and back pain )   Started after hauling her luggage around airport    History/Other:    Chief Complaint Reviewed and Verified  Nursing Notes Reviewed and   Verified  Tobacco Reviewed  Allergies Reviewed  Medications Reviewed    Problem List Reviewed  Medical History Reviewed  Surgical History   Reviewed  OB Status Reviewed  Family History Reviewed  Social History   Reviewed         Tobacco:  She has never smoked tobacco.    Current Outpatient Medications   Medication Sig Dispense Refill    valsartan 80 MG Oral Tab Take 1 tablet (80 mg total) by mouth daily. 30 tablet 11    naproxen 500 MG Oral Tab Take 1 tablet (500 mg total) by mouth 2 (two) times daily with meals. 60 tablet 0    methylPREDNISolone (MEDROL) 4 MG Oral Tablet Therapy Pack Dosepack: take as directed 1 each 0    Naloxone HCl 4 MG/0.1ML Nasal Liquid 4 mg by Intranasal route as needed (May repeat as needed in other nostril if symptoms persist). If patient remains unresponsive, repeat dose in other nostril 2-5 minutes after first dose. 1 kit 0    rosuvastatin 10 MG Oral Tab Take 1 tablet (10 mg total) by mouth nightly. 90 tablet 1    HYDROCHLOROTHIAZIDE 25 MG Oral Tab TAKE 1 TABLET(25 MG) BY MOUTH DAILY 90 tablet 1    valsartan 160 MG Oral Tab Take 1 tablet (160 mg total) by mouth daily. 30 tablet 11    pantoprazole 40 MG Oral Tab EC Take 1 tablet (40 mg total) by mouth daily as needed. 30 tablet 5    zolpidem 10 MG Oral Tab Take 1 tablet (10 mg total) by mouth nightly as needed. 30 tablet 5    methotrexate 2.5 MG Oral Tab Take 6 tablets (15 mg total) by mouth once a week. 78 tablet 1    Tirzepatide (MOUNJARO) 5 MG/0.5ML Subcutaneous Solution Pen-injector Inject 5 mg into the skin once a week. 2 mL 3    folic acid 1 MG Oral Tab Take 1 tablet (1 mg total) by mouth daily. 90 tablet 2    albuterol (PROAIR HFA) 108 (90 Base) MCG/ACT  Inhalation Aero Soln Inhale 2 puffs into the lungs every 6 (six) hours as needed for Wheezing. 1 each 1    Glucose Blood (ONETOUCH VERIO) In Vitro Strip CHECK SUGARS TWICE DAILY 200 strip 0    EPINEPHrine (EPIPEN 2-YANY) 0.3 MG/0.3ML Injection Solution Auto-injector Inject IM in event of  allergic reaction 1 each 0    Blood Glucose Monitoring Suppl (ONETOUCH VERIO FLEX SYSTEM) w/Device Does not apply Kit 1 each As Directed. To check sugars twice a day E 11.65 with insulin use 1 kit 0    OneTouch Delica Lancets 33G Does not apply Misc To check sugars twice a day E 11.65 with insulin use 200 each 0    Blood Glucose Monitoring Suppl Does not apply Kit Use as directed to check blood glucose twice a day - fasting and before dinner 1 kit 0    Blood Glucose Monitoring Suppl Does not apply Device Use as directed to check blood glucose twice a day -fasting and before dinner 200 each 0    Microlet Lancets Does not apply Misc Use as directed to check blood glucose twice a day - fasting and before dinner 200 each 0         Review of Systems:  Review of Systems   Constitutional: Negative.    Musculoskeletal:  Positive for back pain.   Neurological:  Negative for weakness and numbness.         Objective:   /86   Pulse 106   Ht 5' 7\" (1.702 m)   Wt 186 lb (84.4 kg)   LMP 11/15/2021 (Approximate)   SpO2 96%   BMI 29.13 kg/m²  Estimated body mass index is 29.13 kg/m² as calculated from the following:    Height as of this encounter: 5' 7\" (1.702 m).    Weight as of this encounter: 186 lb (84.4 kg).  Physical Exam  Vitals reviewed.   Cardiovascular:      Rate and Rhythm: Normal rate and regular rhythm.   Musculoskeletal:      Lumbar back: Tenderness present. Decreased range of motion. Negative right straight leg raise test and negative left straight leg raise test.      Comments: Left lumbar and sacral area tenderness   Neurological:      Sensory: Sensation is intact.      Motor: Motor function is intact.            Assessment & Plan:   1. Lumbar back pain (Primary)  2. Essential hypertension  Other orders  -     Valsartan; Take 1 tablet (80 mg total) by mouth daily.  Dispense: 30 tablet; Refill: 11  -     Naproxen; Take 1 tablet (500 mg total) by mouth 2 (two) times daily with meals.  Dispense: 60 tablet; Refill: 0    Demonstrated exercises.  Heating pad daily.  Getting better but return if not resolved. BP controlled. Stay on current meds.       No follow-ups on file.    Wayne Clark DO, 12/3/2024, 3:55 PM

## 2024-12-07 ENCOUNTER — APPOINTMENT (OUTPATIENT)
Dept: CT IMAGING | Facility: HOSPITAL | Age: 54
End: 2024-12-07
Attending: EMERGENCY MEDICINE
Payer: COMMERCIAL

## 2024-12-07 ENCOUNTER — HOSPITAL ENCOUNTER (EMERGENCY)
Facility: HOSPITAL | Age: 54
Discharge: HOME | End: 2024-12-07
Attending: EMERGENCY MEDICINE
Payer: COMMERCIAL

## 2024-12-07 VITALS
SYSTOLIC BLOOD PRESSURE: 163 MMHG | RESPIRATION RATE: 18 BRPM | DIASTOLIC BLOOD PRESSURE: 101 MMHG | OXYGEN SATURATION: 100 % | TEMPERATURE: 98 F | HEART RATE: 95 BPM

## 2024-12-07 DIAGNOSIS — M54.50 ACUTE RIGHT-SIDED LOW BACK PAIN WITHOUT SCIATICA: Primary | ICD-10-CM

## 2024-12-07 LAB
ALBUMIN SERPL-MCNC: 4.7 G/DL (ref 3.2–4.8)
ALP LIVER SERPL-CCNC: 55 U/L
ALT SERPL-CCNC: 13 U/L
ANION GAP SERPL CALC-SCNC: 9 MMOL/L (ref 0–18)
AST SERPL-CCNC: 26 U/L (ref ?–34)
BASOPHILS # BLD AUTO: 0.01 X10(3) UL (ref 0–0.2)
BASOPHILS NFR BLD AUTO: 0.1 %
BILIRUB DIRECT SERPL-MCNC: <0.1 MG/DL (ref ?–0.3)
BILIRUB SERPL-MCNC: 0.4 MG/DL (ref 0.3–1.2)
BILIRUB UR QL: NEGATIVE
BUN BLD-MCNC: 14 MG/DL (ref 9–23)
BUN/CREAT SERPL: 15.7 (ref 10–20)
CALCIUM BLD-MCNC: 9.9 MG/DL (ref 8.7–10.4)
CHLORIDE SERPL-SCNC: 107 MMOL/L (ref 98–112)
CLARITY UR: CLEAR
CO2 SERPL-SCNC: 26 MMOL/L (ref 21–32)
CREAT BLD-MCNC: 0.89 MG/DL
DEPRECATED RDW RBC AUTO: 46.5 FL (ref 35.1–46.3)
EGFRCR SERPLBLD CKD-EPI 2021: 78 ML/MIN/1.73M2 (ref 60–?)
EOSINOPHIL # BLD AUTO: 0.05 X10(3) UL (ref 0–0.7)
EOSINOPHIL NFR BLD AUTO: 0.7 %
ERYTHROCYTE [DISTWIDTH] IN BLOOD BY AUTOMATED COUNT: 12.7 % (ref 11–15)
GLUCOSE BLD-MCNC: 87 MG/DL (ref 70–99)
GLUCOSE UR-MCNC: NORMAL MG/DL
HCT VFR BLD AUTO: 37.6 %
HGB BLD-MCNC: 12.1 G/DL
HGB UR QL STRIP.AUTO: NEGATIVE
IMM GRANULOCYTES # BLD AUTO: 0.01 X10(3) UL (ref 0–1)
IMM GRANULOCYTES NFR BLD: 0.1 %
KETONES UR-MCNC: NEGATIVE MG/DL
LEUKOCYTE ESTERASE UR QL STRIP.AUTO: 500
LIPASE SERPL-CCNC: 48 U/L (ref 12–53)
LYMPHOCYTES # BLD AUTO: 1.13 X10(3) UL (ref 1–4)
LYMPHOCYTES NFR BLD AUTO: 15.5 %
MCH RBC QN AUTO: 32.2 PG (ref 26–34)
MCHC RBC AUTO-ENTMCNC: 32.2 G/DL (ref 31–37)
MCV RBC AUTO: 100 FL
MONOCYTES # BLD AUTO: 0.52 X10(3) UL (ref 0.1–1)
MONOCYTES NFR BLD AUTO: 7.1 %
NEUTROPHILS # BLD AUTO: 5.56 X10 (3) UL (ref 1.5–7.7)
NEUTROPHILS # BLD AUTO: 5.56 X10(3) UL (ref 1.5–7.7)
NEUTROPHILS NFR BLD AUTO: 76.5 %
NITRITE UR QL STRIP.AUTO: NEGATIVE
OSMOLALITY SERPL CALC.SUM OF ELEC: 294 MOSM/KG (ref 275–295)
PH UR: 6 [PH] (ref 5–8)
PLATELET # BLD AUTO: 294 10(3)UL (ref 150–450)
POTASSIUM SERPL-SCNC: 4.1 MMOL/L (ref 3.5–5.1)
PROT SERPL-MCNC: 7.9 G/DL (ref 5.7–8.2)
PROT UR-MCNC: NEGATIVE MG/DL
RBC # BLD AUTO: 3.76 X10(6)UL
SODIUM SERPL-SCNC: 142 MMOL/L (ref 136–145)
SP GR UR STRIP: 1.01 (ref 1–1.03)
UROBILINOGEN UR STRIP-ACNC: NORMAL
WBC # BLD AUTO: 7.3 X10(3) UL (ref 4–11)

## 2024-12-07 PROCEDURE — 80076 HEPATIC FUNCTION PANEL: CPT | Performed by: EMERGENCY MEDICINE

## 2024-12-07 PROCEDURE — 96374 THER/PROPH/DIAG INJ IV PUSH: CPT

## 2024-12-07 PROCEDURE — 87086 URINE CULTURE/COLONY COUNT: CPT | Performed by: EMERGENCY MEDICINE

## 2024-12-07 PROCEDURE — 85025 COMPLETE CBC W/AUTO DIFF WBC: CPT | Performed by: EMERGENCY MEDICINE

## 2024-12-07 PROCEDURE — 99284 EMERGENCY DEPT VISIT MOD MDM: CPT

## 2024-12-07 PROCEDURE — 80048 BASIC METABOLIC PNL TOTAL CA: CPT | Performed by: EMERGENCY MEDICINE

## 2024-12-07 PROCEDURE — 83690 ASSAY OF LIPASE: CPT | Performed by: EMERGENCY MEDICINE

## 2024-12-07 PROCEDURE — 81001 URINALYSIS AUTO W/SCOPE: CPT | Performed by: EMERGENCY MEDICINE

## 2024-12-07 PROCEDURE — 74176 CT ABD & PELVIS W/O CONTRAST: CPT | Performed by: EMERGENCY MEDICINE

## 2024-12-07 RX ORDER — KETOROLAC TROMETHAMINE 15 MG/ML
15 INJECTION, SOLUTION INTRAMUSCULAR; INTRAVENOUS ONCE
Status: COMPLETED | OUTPATIENT
Start: 2024-12-07 | End: 2024-12-07

## 2024-12-07 RX ORDER — LIDOCAINE 50 MG/G
1 PATCH TOPICAL EVERY 24 HOURS
Qty: 7 PATCH | Refills: 0 | Status: SHIPPED | OUTPATIENT
Start: 2024-12-07

## 2024-12-07 NOTE — ED PROVIDER NOTES
Patient Seen in: [facility] Emergency Department      History     Chief Complaint   Patient presents with    Abdomen/Flank Pain     Stated Complaint: right flank pain    Subjective:   HPI    54-year-old female with history of right back pain who presents for evaluation of right low back pain.  She injured her back a couple of days ago while moving heavy luggage.  Pain is located in the right low back.  Today and yesterday evening she noticed the pain started to radiate towards the right flank.  Pain is worse with certain movements such as twisting her torso or laying in certain positions.  Pain in the low back is worse with a deep breath then.  No chest pain.  No shortness of breath.  No dysuria, hematuria, abnormal vaginal bleeding or discharge.  No nausea or vomiting.  No fever or chills.  No diarrhea or constipation.      Objective:     Past Medical History:    Allergy    allergies    Amenorrhea    Asthma (HCC)    Decorative tattoo    Diabetes (HCC)    Essential hypertension    High blood pressure    High cholesterol    Hypercholesteremia    Hyperlipidemia    Unspecified disorder of neck    overactive gland in neck, surgical removal 1991              Past Surgical History:   Procedure Laterality Date    Hysteroscopy, sterilization  2017    Essure    Incisional biopsy skin single lesion Left 09/23/2020    left shoulder lipoma    Sara biopsy stereo nodule 1 site left (cpt=19081)  12/08/2023    for focal asymetry; X clip    Sara localization wire 1 site left (cpt=19281) Left 02/22/2024    vision clip    Neck/chest procedure unlisted  1991    surgical removal (overactive gland in neck)    Needle biopsy left Left 12/20/2023    US CNB    Wrist arthroscop,release xvers lig Right 12/10/2021    Right endoscopic carpal tunnel release                Social History     Socioeconomic History    Marital status: Single   Tobacco Use    Smoking status: Never    Smokeless tobacco: Never   Vaping Use    Vaping status: Never  Used   Substance and Sexual Activity    Alcohol use: Yes     Comment: occasionally    Drug use: No    Sexual activity: Yes     Partners: Male     Birth control/protection: Implant     Comment: ESSURE   Other Topics Concern    Caffeine Concern Yes     Comment: coffee, 1/2 cup daily    Right Handed Yes                  Physical Exam     ED Triage Vitals [12/07/24 0612]   BP (!) 163/101   Pulse 95   Resp 18   Temp 97.8 °F (36.6 °C)   Temp src Oral   SpO2 100 %   O2 Device None (Room air)       Current Vitals:   Vital Signs  BP: (!) 163/101  Pulse: 95  Resp: 18  Temp: 97.8 °F (36.6 °C)  Temp src: Oral  MAP (mmHg): (!) 122    Oxygen Therapy  SpO2: 100 %  O2 Device: None (Room air)        Physical Exam  Vitals and nursing note reviewed.   Constitutional:       Appearance: She is well-developed.   HENT:      Head: Normocephalic and atraumatic.   Eyes:      Extraocular Movements: Extraocular movements intact.   Cardiovascular:      Rate and Rhythm: Normal rate and regular rhythm.      Heart sounds: Normal heart sounds.   Pulmonary:      Effort: Pulmonary effort is normal.      Breath sounds: Normal breath sounds.   Abdominal:      General: There is no distension.      Palpations: Abdomen is soft.      Tenderness: There is no abdominal tenderness. There is no right CVA tenderness or left CVA tenderness.   Musculoskeletal:         General: Normal range of motion.      Cervical back: Normal range of motion.        Back:       Comments: Tender to palpation in the right lumbar paraspinal musculature, nontender at the CVA, tenderness does not extend above the costal margin   Skin:     General: Skin is warm.   Neurological:      Mental Status: She is alert.      Comments: No focal deficits     Differential diagnosis includes but is not limited to renal colic or obstructive uropathy, pyelonephritis, less likely diverticulitis, musculoskeletal back pain        ED Course     Labs Reviewed   URINALYSIS WITH CULTURE REFLEX - Abnormal;  Notable for the following components:       Result Value    Leukocyte Esterase Urine 500 (*)     Squamous Epi. Cells Few (*)     All other components within normal limits   CBC WITH DIFFERENTIAL WITH PLATELET - Abnormal; Notable for the following components:    RBC 3.76 (*)     RDW-SD 46.5 (*)     All other components within normal limits   BASIC METABOLIC PANEL (8) - Normal   HEPATIC FUNCTION PANEL (7) - Normal   LIPASE - Normal   URINE CULTURE, ROUTINE       ED Course as of 12/07/24 1446  ------------------------------------------------------------  Time: 12/07 0815  Comment: Urinalysis negative for clear UTI  ------------------------------------------------------------  Time: 12/07 0815  Comment: BMP, LFTs and lipase unremarkable  ------------------------------------------------------------  Time: 12/07 0815  Comment: CBC without leukocytosis or anemia       CT ABDOMEN+PELVIS KIDNEYSTONE 2D RNDR(NO IV,NO ORAL)(CPT=74176)    Result Date: 12/7/2024  CONCLUSION:  1. No evidence of nephroureterolithiasis or obstructive uropathy.  2. Nonspecific bladder wall thickening may be related to underdistention or cystitis.  3. Otherwise, no acute intra-abdominal process is identified by noncontrast CT technique. No additional etiology of the patient's symptoms is appreciated from this study.  4. Bilateral Essure coils are in place.  5. Large hepatic cysts are noted.  6. Low-density appearance of the intracardiac blood pool raises the possibility of underlying anemia. Correlate with hematologic parameters.  7. Sequela of remote granulomatous disease.  8. Lesser incidental findings as above.    Dictated by (CST): Stephan Thompson MD on 12/07/2024 at 8:09 AM     Finalized by (CST): Stephan Thompson MD on 12/07/2024 at 8:15 AM                [facility]              Medical Decision Making  Vitals are stable in the ED.  Patient is ambulatory.  Labs reviewed as above.  Per my independent interpretation of CT abdomen pelvis, no obstructing  ureteral stone.  Suspect musculoskeletal etiology and discussed this with patient at bedside.  Advised NSAIDs, Tylenol, lidocaine patch as needed and outpatient follow-up and she is comfortable with the plan.    Problems Addressed:  Acute right-sided low back pain without sciatica: complicated acute illness or injury with systemic symptoms    Amount and/or Complexity of Data Reviewed  Labs: ordered. Decision-making details documented in ED Course.  Radiology: ordered and independent interpretation performed. Decision-making details documented in ED Course.    Risk  Prescription drug management.        Disposition and Plan     Clinical Impression:  1. Acute right-sided low back pain without sciatica         Disposition:  Discharge  12/7/2024  8:31 am    Follow-up:  Wayne Clark,   [address] [facility] [address]  [phone]    Follow up in 1 week(s)            Medications Prescribed:  Discharge Medication List as of 12/7/2024  8:34 AM        START taking these medications    Details   lidocaine 5 % External Patch Place 1 patch onto the skin daily., Normal, Disp-7 patch, R-0                 Supplementary Documentation:

## 2024-12-07 NOTE — ED INITIAL ASSESSMENT (HPI)
Right sided flank pain since last night with deep breaths. Denies trauma or injury to area. Patient states she has been working harder at work than usual lately. Denies any painful urination or blood in urine.

## 2024-12-09 ENCOUNTER — PATIENT MESSAGE (OUTPATIENT)
Dept: FAMILY MEDICINE CLINIC | Facility: CLINIC | Age: 54
End: 2024-12-09

## 2024-12-09 NOTE — TELEPHONE ENCOUNTER
Per 12/3 visit:    Assessment & Plan:  1. Lumbar back pain (Primary)  2. Essential hypertension  Other orders  -     Valsartan; Take 1 tablet (80 mg total) by mouth daily.  Dispense: 30 tablet; Refill: 11  -     Naproxen; Take 1 tablet (500 mg total) by mouth 2 (two) times daily with meals.  Dispense: 60 tablet; Refill: 0     Demonstrated exercises.  Heating pad daily.  Getting better but return if not resolved. BP controlled. Stay on current meds.     Future Appointments   Date Time Provider Department Center   1/15/2025  9:20 AM Sandi Bueno MD [facility]   1/20/2025  5:00 PM Yasmine Tracy MD [facility]   1/23/2025 10:00 AM Behar, Alex, MD PM[facility]R MICHELLE [facility]

## 2024-12-10 ENCOUNTER — PATIENT OUTREACH (OUTPATIENT)
Dept: CASE MANAGEMENT | Age: 54
End: 2024-12-10

## 2024-12-10 RX ORDER — ORPHENADRINE CITRATE 100 MG/1
100 TABLET ORAL 2 TIMES DAILY
Qty: 30 TABLET | Refills: 0 | Status: SHIPPED | OUTPATIENT
Start: 2024-12-10

## 2024-12-10 RX ORDER — NAPROXEN 500 MG/1
500 TABLET ORAL 2 TIMES DAILY WITH MEALS
Qty: 60 TABLET | Refills: 0 | Status: SHIPPED | OUTPATIENT
Start: 2024-12-10

## 2024-12-10 NOTE — PROGRESS NOTES
ED follow up.    Wayne Clark,   [address] [address]  [phone]    Attempt #1:  Left message on voicemail for patient to call transitions specialist back to schedule follow up appointments. Provided Transitions specialist scheduling phone number [phone].

## 2024-12-11 NOTE — PROGRESS NOTES
ED follow up.    Wayne Clark,   [facility]  [address] [place] [id number] [phone]    Patient states she has already followed up.  Confirmed with patient.    Closing encounter.

## 2024-12-27 ENCOUNTER — TELEPHONE (OUTPATIENT)
Dept: RHEUMATOLOGY | Facility: CLINIC | Age: 54
End: 2024-12-27

## 2024-12-27 DIAGNOSIS — M35.1 MCTD (MIXED CONNECTIVE TISSUE DISEASE) (HCC): Primary | ICD-10-CM

## 2024-12-27 NOTE — TELEPHONE ENCOUNTER
Patient has upcoming follow up appt with Dr. Sandi Bueno. Patient has HMO insurance and there is no valid referral on file. The one on file states it is closed.

## 2025-01-07 ENCOUNTER — PATIENT MESSAGE (OUTPATIENT)
Dept: FAMILY MEDICINE CLINIC | Facility: CLINIC | Age: 55
End: 2025-01-07

## 2025-01-08 ENCOUNTER — LAB ENCOUNTER (OUTPATIENT)
Dept: LAB | Age: 55
End: 2025-01-08
Attending: INTERNAL MEDICINE
Payer: COMMERCIAL

## 2025-01-08 DIAGNOSIS — G89.29 CHRONIC PAIN OF BOTH KNEES: ICD-10-CM

## 2025-01-08 DIAGNOSIS — Z51.81 MEDICATION MONITORING ENCOUNTER: ICD-10-CM

## 2025-01-08 DIAGNOSIS — M79.642 BILATERAL HAND PAIN: ICD-10-CM

## 2025-01-08 DIAGNOSIS — M79.641 BILATERAL HAND PAIN: ICD-10-CM

## 2025-01-08 DIAGNOSIS — M25.561 CHRONIC PAIN OF BOTH KNEES: ICD-10-CM

## 2025-01-08 DIAGNOSIS — M25.562 CHRONIC PAIN OF BOTH KNEES: ICD-10-CM

## 2025-01-08 DIAGNOSIS — M35.1 MCTD (MIXED CONNECTIVE TISSUE DISEASE) (HCC): ICD-10-CM

## 2025-01-08 LAB
ALBUMIN SERPL-MCNC: 4.5 G/DL (ref 3.2–4.8)
ALT SERPL-CCNC: 17 U/L
AST SERPL-CCNC: 28 U/L (ref ?–34)
BASOPHILS # BLD AUTO: 0.03 X10(3) UL (ref 0–0.2)
BASOPHILS NFR BLD AUTO: 0.6 %
CREAT BLD-MCNC: 0.82 MG/DL
CRP SERPL-MCNC: 1.5 MG/DL (ref ?–1)
DEPRECATED RDW RBC AUTO: 43.7 FL (ref 35.1–46.3)
EGFRCR SERPLBLD CKD-EPI 2021: 86 ML/MIN/1.73M2 (ref 60–?)
EOSINOPHIL # BLD AUTO: 0.24 X10(3) UL (ref 0–0.7)
EOSINOPHIL NFR BLD AUTO: 4.8 %
ERYTHROCYTE [DISTWIDTH] IN BLOOD BY AUTOMATED COUNT: 12.1 % (ref 11–15)
ERYTHROCYTE [SEDIMENTATION RATE] IN BLOOD: 94 MM/HR
HCT VFR BLD AUTO: 36.7 %
HGB BLD-MCNC: 12.2 G/DL
IMM GRANULOCYTES # BLD AUTO: 0.01 X10(3) UL (ref 0–1)
IMM GRANULOCYTES NFR BLD: 0.2 %
LYMPHOCYTES # BLD AUTO: 1.14 X10(3) UL (ref 1–4)
LYMPHOCYTES NFR BLD AUTO: 22.8 %
MCH RBC QN AUTO: 32.5 PG (ref 26–34)
MCHC RBC AUTO-ENTMCNC: 33.2 G/DL (ref 31–37)
MCV RBC AUTO: 97.9 FL
MONOCYTES # BLD AUTO: 0.5 X10(3) UL (ref 0.1–1)
MONOCYTES NFR BLD AUTO: 10 %
NEUTROPHILS # BLD AUTO: 3.09 X10 (3) UL (ref 1.5–7.7)
NEUTROPHILS # BLD AUTO: 3.09 X10(3) UL (ref 1.5–7.7)
NEUTROPHILS NFR BLD AUTO: 61.6 %
PLATELET # BLD AUTO: 261 10(3)UL (ref 150–450)
RBC # BLD AUTO: 3.75 X10(6)UL
WBC # BLD AUTO: 5 X10(3) UL (ref 4–11)

## 2025-01-08 PROCEDURE — 82040 ASSAY OF SERUM ALBUMIN: CPT

## 2025-01-08 PROCEDURE — 82565 ASSAY OF CREATININE: CPT

## 2025-01-08 PROCEDURE — 36415 COLL VENOUS BLD VENIPUNCTURE: CPT

## 2025-01-08 PROCEDURE — 84450 TRANSFERASE (AST) (SGOT): CPT

## 2025-01-08 PROCEDURE — 85025 COMPLETE CBC W/AUTO DIFF WBC: CPT

## 2025-01-08 PROCEDURE — 84460 ALANINE AMINO (ALT) (SGPT): CPT

## 2025-01-08 PROCEDURE — 86140 C-REACTIVE PROTEIN: CPT

## 2025-01-08 PROCEDURE — 85652 RBC SED RATE AUTOMATED: CPT

## 2025-01-08 NOTE — TELEPHONE ENCOUNTER
[employer] message with recommendation sent to patient.  Per chart review pt was seen in ER [place] 1-7-25.

## 2025-01-09 ENCOUNTER — TELEPHONE (OUTPATIENT)
Dept: FAMILY MEDICINE CLINIC | Facility: CLINIC | Age: 55
End: 2025-01-09

## 2025-01-09 NOTE — TELEPHONE ENCOUNTER
Spoke with patient, Date of Birth verified  She stated she went to [facility] on 1/7/25 for abdominal pain.   She had urine test done but it was inconclusive, may need CT scan.   She was told no UTI, maybe kidney stone? She was advised by  Provider to go to ER but she didn't go.   Yesterday she has no pain, today has on and off pain when sitting.  She was offered a follow up appt with available Provider but she declined.  She stated she will call back, she will go to ER if symptom gets worse.        FYI      Future Appointments   Date Time Provider Department Center   1/15/2025  9:20 AM [facility]   1/20/2025  5:00 PM [facility]   1/23/2025 10:00 AM Behar, Alex, MD PM[facility]   1/27/2025  1:00 PM Wayne Clark DO [facility]

## 2025-01-15 ENCOUNTER — OFFICE VISIT (OUTPATIENT)
Dept: RHEUMATOLOGY | Facility: CLINIC | Age: 55
End: 2025-01-15
Payer: COMMERCIAL

## 2025-01-15 VITALS
SYSTOLIC BLOOD PRESSURE: 164 MMHG | DIASTOLIC BLOOD PRESSURE: 98 MMHG | BODY MASS INDEX: 29.03 KG/M2 | WEIGHT: 185 LBS | HEIGHT: 67 IN | HEART RATE: 94 BPM

## 2025-01-15 DIAGNOSIS — Z51.81 MEDICATION MONITORING ENCOUNTER: ICD-10-CM

## 2025-01-15 DIAGNOSIS — M25.562 CHRONIC PAIN OF BOTH KNEES: ICD-10-CM

## 2025-01-15 DIAGNOSIS — M25.561 CHRONIC PAIN OF BOTH KNEES: ICD-10-CM

## 2025-01-15 DIAGNOSIS — G89.29 CHRONIC PAIN OF BOTH KNEES: ICD-10-CM

## 2025-01-15 DIAGNOSIS — M35.1 MCTD (MIXED CONNECTIVE TISSUE DISEASE) (HCC): Primary | ICD-10-CM

## 2025-01-15 DIAGNOSIS — M79.641 BILATERAL HAND PAIN: ICD-10-CM

## 2025-01-15 DIAGNOSIS — M79.642 BILATERAL HAND PAIN: ICD-10-CM

## 2025-01-15 PROCEDURE — 99214 OFFICE O/P EST MOD 30 MIN: CPT | Performed by: INTERNAL MEDICINE

## 2025-01-15 PROCEDURE — 3080F DIAST BP >= 90 MM HG: CPT | Performed by: INTERNAL MEDICINE

## 2025-01-15 PROCEDURE — G2211 COMPLEX E/M VISIT ADD ON: HCPCS | Performed by: INTERNAL MEDICINE

## 2025-01-15 PROCEDURE — 3008F BODY MASS INDEX DOCD: CPT | Performed by: INTERNAL MEDICINE

## 2025-01-15 PROCEDURE — 3077F SYST BP >= 140 MM HG: CPT | Performed by: INTERNAL MEDICINE

## 2025-01-15 NOTE — PROGRESS NOTES
Paula Brooks is a 54 year old female.    HPI:     Chief Complaint   Patient presents with    Mixed Connective Tissue Disease    Stiffness     Pasquale Hand Fingers Stiffness        I had the pleasure of seeing Paula Brooks on 1/15/2025 for follow up for MCTD (+KAREN, RNP, SSA, inflammatory arthritis)    Current Medications:  Tylenol arthritis or aleve as needed   Leflunomide 20 mg daily- started 12/2024   mg daily- started 7/2024  Naproxen 500 mg BID  Prednisone as needed   Previous medications:  Methotrexate 6 pills weekly- started 3/2024-12/2024, had significant oral ulcers   Blood work:  +KAREN 1:5120 speckled pattern, +SSA, RNP  Neg RF, CCP, CRP  ESR 30  XR L knee: minimal OA, joint effusion   US hands: Synovial thickening involving the MCP and IP joints of both hands, mild hyperemia involving the right first MCP joint consistent with active synovitis, no erosions  CXR 5/2024: normal      Interval History:  This is a 50 yo F with no medical conditions presents with left knee pain.  She reports left knee pain starting a couple of weeks ago.  She has been walking 3 miles every other day and is noticed some increased knee pain and stiffness.  She also reports intermittent swelling in the suprapatellar region.  She states that the knee is not very painful but stiff and uncomfortable.  Denies any other joint pain or swelling.  Reports stiffness throughout her body in the morning but loosens up in a couple of minutes.  Has been taking Tylenol and Aleve with minimal relief in her knee.  Denies any history of rash, psoriasis, uveitis, dactylitis, enthesitis, GI or  symptoms.    9/8/2021:  She was seen back in July for left knee pain, she received a cortisone injection her symptoms have improved  She reports less stiffness in her knee, no swelling.  She did not do physical therapy  She reports having worsening right wrist pain with numbness and tingling in her hands.  Both hands are affected but right hand is worse  than the left  Symptoms started about 1 week ago  Wrist feels slightly swollen  She wakes up with numbness and tingling in her right hand  She works at a [employer] and is on the computer for most the day  She tried Aleve, Motrin and Tylenol with minimal relief  Denies any history of psoriasis, Crohn's or also colitis or lower back pain    10/8/2021:  She presents for follow-up of her right hand numbness and tingling and pain  EMG was done showing findings consistent with moderate carpal tunnel symptoms  She states her symptoms are more persistent now, has chronic numbness and tingling in her right hand  She has tried prednisone, naproxen and Tylenol with minimal relief  Continues to wear the wrist splints at night  Has noticed some numbness in her right fourth and fifth fingers but very mild    11/12/2021:   She been following with right hand pain due to carpal tunnel symptoms.  She will be having carpal tunnel surgery on December 10  Now having numbness and tingling in her left hand mostly in the fingers  Worse in the morning. Tingling is non-stop  EMG done of R hand but not the left     2/28/2022:   Presents for follow-up of left knee pain secondary to OA  Reports some swelling in her left knee and difficulty ambulating.  She had a cortisone injection back in July 2021 and it helped.  X-ray of the left knee showed minimal OA  She had right carpal tunnel release back in December 2021 and it helped significantly.  Left wrist is doing well too.  No numbness or tingling in the left wrist.  She is also following with allergist for chronic hives.  Sh is on antihistamines and prednisone as needed.  They are try to get her approved for Xolair monthly    3/5/2024:  Presents with worsening joint pain, last seen Feb 2022  Having worsening pain in the hands for the last 1-2 mos  Having swelling across the MCPs, hard to make full fist with both hands  Pain is worse in the morning   Had Right hand carpal tunnel release,  it was good for a year but then started to have pain again  No numbness or tingling in the fingers  Having pain in both knees now, no swelling in the knees  Having stiffness in the wrists and shoulders  No rash, lower back pain  Had a left breast biopsy but was benign     4/23/2024:  Presents for f/u of joint pain, mostly in the hands and the knees  When waking up in the morning hands are stiff  Had surgery in the right hand for carpal tunnel 2021, no n/t now but stiffness  Has swelling in the fingers  When put on prednisone it does help, when on prednisone 5 mg symptoms were stable but still has some pain and swelling  Has pin in both knees, left is worse than right    7/8/2024:  Presents for f/u of joint pain, found to have MCTD (+KAREN, RNP, SSA, inflammatory arthritis, one episode of RP)  She is now on methotrexate 6 pills weekly and FA   Overall the body pains have improved  Still having b/l hand pain, right worse than left   Able to make a fist  Knees are stable, both knees were injected with cortisone which helped   Would also get intermittent rashes but improved on methotrexate, she was on xolar in the past but not on it anymore  Mild dry eyes, no dry mouth    10/23/2024:  Presents for f/u of joint pain, found to have MCTD (+KAREN, RNP, SSA, inflammatory arthritis, one episode of RP)  She is now on methotrexate 6 pills weekly, FA and  mg daily  Has less pain and swelling in the hands  Has some stiffness in the morning   Also some numbness and tingling in left 4th and 5th fingers  Still has dry eyes  Has a rash on left elbow and under right breast, no papules   Had a recent echocardiogram   Having some nodules in the hands, seems like calcinosis cutis     1/15/2025:  Presents for f/u of joint pain, found to have MCTD (+KAREN, RNP, SSA, inflammatory arthritis, one episode of RP)  She was on methotrexate but had significant mouth sores   Now on leflunomide for the past month  Now having more stiffness in the  hands, joints felt better on methotrexate  During her last visit both knees were injected with cortisone, it did helped  No rashes,   Having some pain in right lower back and flank region   CT a/p last month was essentially negative                 HISTORY:  Past Medical History:    Allergy    allergies    Amenorrhea    Asthma (HCC)    Decorative tattoo    Diabetes (HCC)    Essential hypertension    High blood pressure    High cholesterol    Hypercholesteremia    Hyperlipidemia    Unspecified disorder of neck    overactive gland in neck, surgical removal 1991      Social Hx Reviewed   Family Hx Reviewed     Medications (Active prior to today's visit):  Current Outpatient Medications   Medication Sig Dispense Refill    naproxen 500 MG Oral Tab Take 1 tablet (500 mg total) by mouth 2 (two) times daily with meals. 60 tablet 0    orphenadrine  MG Oral Tablet 12 Hr Take 100 mg by mouth in the morning and 100 mg before bedtime. (Patient not taking: Reported on 1/15/2025) 30 tablet 0    lidocaine 5 % External Patch Place 1 patch onto the skin daily. (Patient not taking: Reported on 1/15/2025) 7 patch 0    leflunomide 20 MG Oral Tab Take 1 tablet (20 mg total) by mouth daily. 90 tablet 0    valsartan 80 MG Oral Tab Take 1 tablet (80 mg total) by mouth daily. 30 tablet 11    methylPREDNISolone (MEDROL) 4 MG Oral Tablet Therapy Pack Dosepack: take as directed (Patient not taking: Reported on 1/15/2025) 1 each 0    Naloxone HCl 4 MG/0.1ML Nasal Liquid 4 mg by Intranasal route as needed (May repeat as needed in other nostril if symptoms persist). If patient remains unresponsive, repeat dose in other nostril 2-5 minutes after first dose. 1 kit 0    rosuvastatin 10 MG Oral Tab Take 1 tablet (10 mg total) by mouth nightly. 90 tablet 1    HYDROCHLOROTHIAZIDE 25 MG Oral Tab TAKE 1 TABLET(25 MG) BY MOUTH DAILY 90 tablet 1    valsartan 160 MG Oral Tab Take 1 tablet (160 mg total) by mouth daily. 30 tablet 11    pantoprazole 40  MG Oral Tab EC Take 1 tablet (40 mg total) by mouth daily as needed. 30 tablet 5    zolpidem 10 MG Oral Tab Take 1 tablet (10 mg total) by mouth nightly as needed. 30 tablet 5    methotrexate 2.5 MG Oral Tab Take 6 tablets (15 mg total) by mouth once a week. (Patient not taking: Reported on 1/15/2025) 78 tablet 1    Tirzepatide (MOUNJARO) 5 MG/0.5ML Subcutaneous Solution Pen-injector Inject 5 mg into the skin once a week. 2 mL 3    folic acid 1 MG Oral Tab Take 1 tablet (1 mg total) by mouth daily. (Patient not taking: Reported on 1/15/2025) 90 tablet 2    albuterol (PROAIR HFA) 108 (90 Base) MCG/ACT Inhalation Aero Soln Inhale 2 puffs into the lungs every 6 (six) hours as needed for Wheezing. 1 each 1    Glucose Blood (ONETOUCH VERIO) In Vitro Strip CHECK SUGARS TWICE DAILY 200 strip 0    EPINEPHrine (EPIPEN 2-YANY) 0.3 MG/0.3ML Injection Solution Auto-injector Inject IM in event of  allergic reaction 1 each 0    Blood Glucose Monitoring Suppl (ONETOUCH VERIO FLEX SYSTEM) w/Device Does not apply Kit 1 each As Directed. To check sugars twice a day E 11.65 with insulin use 1 kit 0    OneTouch Delica Lancets 33G Does not apply Misc To check sugars twice a day E 11.65 with insulin use 200 each 0    Blood Glucose Monitoring Suppl Does not apply Kit Use as directed to check blood glucose twice a day - fasting and before dinner 1 kit 0    Blood Glucose Monitoring Suppl Does not apply Device Use as directed to check blood glucose twice a day -fasting and before dinner 200 each 0    Microlet Lancets Does not apply Misc Use as directed to check blood glucose twice a day - fasting and before dinner 200 each 0     .cmed  Allergies:  Allergies   Allergen Reactions    Iodine (Topical) ANAPHYLAXIS, HIVES and UNKNOWN     Eyes swell, welts on body    Shellfish HIVES         ROS:   All other ROS are negative.     PHYSICAL EXAM:   GEN: AAOx3, NAD  HEENT: EOMI, PERRLA, no injection or icterus, oral mucosa moist, no oral lesions. No  lymphadenopathy. No facial rash  CVS: RRR, no murmurs rubs or gallops. Equal 2+ distal pulses.   LUNGS: CTAB, no increased work of breathing  ABDOMEN:  soft NT/ND, +BS, no HSM  SKIN: No rashes or skin lesions. No nail findings  MSK:  Cervical spine: FROM  Hands: Swelling noted diffusely in her fingers, able to make a full fist but not strong  Wrist: FROM, no pain or swelling or warmth on palpation  Elbow: FROM, no pain or swelling or warmth on palpation  Shoulders: FROM, no pain or swelling or warmth on palpation  Hip: normal log roll, no lateral hip pain, CHRIS test negative b/l  Knees: L knee no swelling, nonTTP   Ankles: FROM, no pain or swelling or warmth on palpation  Feet: no pain with MTP squeeze, no toe swelling or pain or warmth on palpation with FROM  Spine: no lumbar or sacral pain on palpation.  NEURO: Cranial nerves II-XII intact grossly. 5/5 strength throughout in both upper and lower extremities, sensation intact.  PSYCH: normal mood       LABS:     Component      Latest Ref Rng & Units 6/17/2020   C-REACTIVE PROTEIN      <0.30 mg/dL 0.34 (H)   C-Citrullinated Peptide IgG AB      0.0 - 6.9 U/mL 1.5   SED RATE      0 - 20 mm/Hr 35 (H)   RHEUMATOID FACTOR      <15 IU/mL <10     Component      Latest Ref Rng 4/23/2024   Anti-SSA Antibody, IGG      <7 U/mL 9.2 (H)    Anti-SSB Antibody, IGG      <7 U/mL 5.0    Anti-Smith Antibody, IGG      <7 U/mL 3.5    Anti-U1RNP Antibody, IGG      <5 U/mL 221.0 (H)    Anti-RNP70 Antibody, IGG      <7 U/mL >218.0 (H)    Anti-Centromere Antibody, IGG      <7 U/mL 1.1    Anti-SCL70 Antibody, IGG      <7 U/mL 2.1    Anti-Alyx-1 Antibody, IGG      <7 U/mL 0.7    Quantiferon TB NIL      IU/mL 0.03    Quantiferon-TB1 Minus NIL      IU/mL 0.01    Quantiferon-TB2 Minus NIL      IU/mL 0.02    Quantiferon TB Mitogen minus NIL      IU/mL >10.00    Quantiferon TB Result      Negative  Negative    KAREN Titer/Pattern      <80  5120 !         Imaging:     US hands 7/2024:  1. Synovial  thickening involving the MCP and IP joints of both hands.  There is mild hyperemia involving the right 1st MCP joint consistent with active synovitis.  No periarticular erosions.   2. Bifid right median nerve is otherwise unremarkable.     XR L knee:  CONCLUSION:   1. Minimal osteoarthritis.   2. Demineralization.   3. Joint effusion.      XR R knee:  Normal examination.     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  She stopped methotrexate and is now on leflunomide for the past month  -Having more stiffness and pain in her hands and throughout her body since switching to leflunomide  -She would like to switch back to methotrexate.  Advised that the sores in the mouth can come back  -She will restart methotrexate 6 pills weekly and folic acid daily.  She will take folic acid 2 pills a day to help prevent the sores.  I also gave her Magic mouthwash  - Continue hydroxychloroquine 400 mg daily  - She was given information on ophthalmology to evaluate for Plaquenil toxicity  - Blood work  every 3 to 4 months    B/L knee pain 2/2 OA  - X-ray of left knee did show evidence of mild osteoarthritis and joint effusion  - s/p cortisone injection July 2021 and April 2024, 10/2024  - Knee fluid was noninflammatory back in October 2024    R hand carpal tunnel release  - EMG done consistent with moderate carpal tunnel in her right hand  - She has tried prednisone, naproxen and Tylenol with minimal relief  - s/p Right carpal tunnel sheath injection  - She had carpal tunnel release surgery in December 2021    Recent breast biopsy  - This was benign, biopsy showed residual papilloma    Pt will f/u 3-4  mos     There is a longitudinal care relationship with me, the care plan reflects the ongoing nature of the continuous relationship of care, and the medical record indicates that there is ongoing treatment of a serious/complex medical condition which I am currently managing.  is Applicable.     Sandi Bueno MD  1/15/2025  9:21 AM

## 2025-01-18 ENCOUNTER — HOSPITAL ENCOUNTER (EMERGENCY)
Facility: HOSPITAL | Age: 55
Discharge: HOME | End: 2025-01-18
Attending: EMERGENCY MEDICINE
Payer: COMMERCIAL

## 2025-01-18 VITALS
DIASTOLIC BLOOD PRESSURE: 83 MMHG | TEMPERATURE: 98 F | HEART RATE: 72 BPM | OXYGEN SATURATION: 99 % | HEIGHT: 67 IN | SYSTOLIC BLOOD PRESSURE: 163 MMHG | RESPIRATION RATE: 16 BRPM | WEIGHT: 185 LBS | BODY MASS INDEX: 29.03 KG/M2

## 2025-01-18 DIAGNOSIS — M54.50 ACUTE RIGHT-SIDED LOW BACK PAIN WITHOUT SCIATICA: Primary | ICD-10-CM

## 2025-01-18 DIAGNOSIS — M79.18 MUSCULOSKELETAL PAIN: ICD-10-CM

## 2025-01-18 LAB
ALBUMIN SERPL-MCNC: 4.4 G/DL (ref 3.2–4.8)
ALBUMIN/GLOB SERPL: 1.3 {RATIO} (ref 1–2)
ALP LIVER SERPL-CCNC: 73 U/L
ALT SERPL-CCNC: 24 U/L
ANION GAP SERPL CALC-SCNC: 9 MMOL/L (ref 0–18)
AST SERPL-CCNC: 27 U/L (ref ?–34)
BASOPHILS # BLD AUTO: 0.01 X10(3) UL (ref 0–0.2)
BASOPHILS NFR BLD AUTO: 0.2 %
BILIRUB SERPL-MCNC: 0.3 MG/DL (ref 0.3–1.2)
BILIRUB UR QL: NEGATIVE
BUN BLD-MCNC: 14 MG/DL (ref 9–23)
BUN/CREAT SERPL: 16.1 (ref 10–20)
CALCIUM BLD-MCNC: 9.6 MG/DL (ref 8.7–10.4)
CHLORIDE SERPL-SCNC: 108 MMOL/L (ref 98–112)
CLARITY UR: CLEAR
CO2 SERPL-SCNC: 25 MMOL/L (ref 21–32)
COLOR UR: YELLOW
CREAT BLD-MCNC: 0.87 MG/DL
DEPRECATED RDW RBC AUTO: 42.9 FL (ref 35.1–46.3)
EGFRCR SERPLBLD CKD-EPI 2021: 80 ML/MIN/1.73M2 (ref 60–?)
EOSINOPHIL # BLD AUTO: 0 X10(3) UL (ref 0–0.7)
EOSINOPHIL NFR BLD AUTO: 0 %
ERYTHROCYTE [DISTWIDTH] IN BLOOD BY AUTOMATED COUNT: 12.1 % (ref 11–15)
GLOBULIN PLAS-MCNC: 3.5 G/DL (ref 2–3.5)
GLUCOSE BLD-MCNC: 136 MG/DL (ref 70–99)
GLUCOSE UR-MCNC: NORMAL MG/DL
HCT VFR BLD AUTO: 35.7 %
HGB BLD-MCNC: 11.6 G/DL
HGB UR QL STRIP.AUTO: NEGATIVE
HYALINE CASTS #/AREA URNS AUTO: PRESENT /LPF
IMM GRANULOCYTES # BLD AUTO: 0.01 X10(3) UL (ref 0–1)
IMM GRANULOCYTES NFR BLD: 0.2 %
LEUKOCYTE ESTERASE UR QL STRIP.AUTO: 75
LIPASE SERPL-CCNC: 48 U/L (ref 12–53)
LYMPHOCYTES # BLD AUTO: 0.96 X10(3) UL (ref 1–4)
LYMPHOCYTES NFR BLD AUTO: 17.2 %
MCH RBC QN AUTO: 31.2 PG (ref 26–34)
MCHC RBC AUTO-ENTMCNC: 32.5 G/DL (ref 31–37)
MCV RBC AUTO: 96 FL
MONOCYTES # BLD AUTO: 0.3 X10(3) UL (ref 0.1–1)
MONOCYTES NFR BLD AUTO: 5.4 %
NEUTROPHILS # BLD AUTO: 4.3 X10 (3) UL (ref 1.5–7.7)
NEUTROPHILS # BLD AUTO: 4.3 X10(3) UL (ref 1.5–7.7)
NEUTROPHILS NFR BLD AUTO: 77 %
NITRITE UR QL STRIP.AUTO: NEGATIVE
OSMOLALITY SERPL CALC.SUM OF ELEC: 297 MOSM/KG (ref 275–295)
PH UR: 5.5 [PH] (ref 5–8)
PLATELET # BLD AUTO: 362 10(3)UL (ref 150–450)
POTASSIUM SERPL-SCNC: 4.6 MMOL/L (ref 3.5–5.1)
PROT SERPL-MCNC: 7.9 G/DL (ref 5.7–8.2)
PROT UR-MCNC: 70 MG/DL
RBC # BLD AUTO: 3.72 X10(6)UL
SODIUM SERPL-SCNC: 142 MMOL/L (ref 136–145)
SP GR UR STRIP: >1.03 (ref 1–1.03)
UROBILINOGEN UR STRIP-ACNC: NORMAL
WBC # BLD AUTO: 5.6 X10(3) UL (ref 4–11)

## 2025-01-18 PROCEDURE — 99283 EMERGENCY DEPT VISIT LOW MDM: CPT

## 2025-01-18 PROCEDURE — 83690 ASSAY OF LIPASE: CPT

## 2025-01-18 PROCEDURE — 85025 COMPLETE CBC W/AUTO DIFF WBC: CPT

## 2025-01-18 PROCEDURE — 80053 COMPREHEN METABOLIC PANEL: CPT | Performed by: EMERGENCY MEDICINE

## 2025-01-18 PROCEDURE — 80053 COMPREHEN METABOLIC PANEL: CPT

## 2025-01-18 PROCEDURE — 85025 COMPLETE CBC W/AUTO DIFF WBC: CPT | Performed by: EMERGENCY MEDICINE

## 2025-01-18 PROCEDURE — 99284 EMERGENCY DEPT VISIT MOD MDM: CPT

## 2025-01-18 PROCEDURE — 87086 URINE CULTURE/COLONY COUNT: CPT | Performed by: EMERGENCY MEDICINE

## 2025-01-18 PROCEDURE — 83690 ASSAY OF LIPASE: CPT | Performed by: EMERGENCY MEDICINE

## 2025-01-18 PROCEDURE — 81001 URINALYSIS AUTO W/SCOPE: CPT | Performed by: EMERGENCY MEDICINE

## 2025-01-18 PROCEDURE — 36415 COLL VENOUS BLD VENIPUNCTURE: CPT

## 2025-01-18 NOTE — ED PROVIDER NOTES
Patient Seen in: [facility] Emergency Department    History     Chief Complaint   Patient presents with    Flank Pain       HPI    Patient presents to the ED complaining of pain in her right lower back for the past 2 to 3 weeks.  She states that she was seen here before for the same symptoms and had a CT scan that showed no findings.  Pain has continued and is worse when she moves certain ways or symptoms when she breathes deeply.  Pain radiates slightly to her right upper abdomen at times.  Denies urinary symptoms, vomiting, fevers chills or other complaints    History reviewed.   Past Medical History:    Allergy    allergies    Amenorrhea    Asthma (HCC)    Decorative tattoo    Diabetes (HCC)    Essential hypertension    High blood pressure    High cholesterol    Hypercholesteremia    Hyperlipidemia    Unspecified disorder of neck    overactive gland in neck, surgical removal 1991       History reviewed.   Past Surgical History:   Procedure Laterality Date    Hysteroscopy, sterilization  2017    Essure    Incisional biopsy skin single lesion Left 09/23/2020    left shoulder lipoma    Sara biopsy stereo nodule 1 site left (cpt=19081)  12/08/2023    for focal asymetry; X clip    Sara localization wire 1 site left (cpt=19281) Left 02/22/2024    vision clip    Neck/chest procedure unlisted  1991    surgical removal (overactive gland in neck)    Needle biopsy left Left 12/20/2023    US CNB    Wrist arthroscop,release xvers lig Right 12/10/2021    Right endoscopic carpal tunnel release         Medications :  Prescriptions Prior to Admission[1]     Family History   Problem Relation Age of Onset    Diabetes Mother     Cancer Mother         lung CA    Heart Disease Father     Allergies Son     No Known Problems Sister     No Known Problems Brother     No Known Problems Brother     No Known Problems Brother        Smoking Status:   Social History     Socioeconomic History    Marital status: Single   Tobacco Use     Smoking status: Never    Smokeless tobacco: Never   Vaping Use    Vaping status: Never Used   Substance and Sexual Activity    Alcohol use: Yes     Comment: occasionally    Drug use: No    Sexual activity: Yes     Partners: Male     Birth control/protection: Implant     Comment: ESSURE   Other Topics Concern    Caffeine Concern Yes     Comment: coffee, 1/2 cup daily    Right Handed Yes       Constitutional and vital signs reviewed.      Social History and Family History elements reviewed from today, pertinent positives to the presenting problem noted.    Physical Exam     ED Triage Vitals [01/18/25 1159]   BP (!) 161/97   Pulse 92   Resp 16   Temp 97.7 °F (36.5 °C)   Temp src Oral   SpO2 99 %   O2 Device None (Room air)       All measures to prevent infection transmission during my interaction with the patient were taken. Handwashing was performed prior to and after the exam.  Stethoscope and any equipment used during my examination was cleaned with super sani-cloth germicidal wipes following the exam.     Physical Exam  Vitals and nursing note reviewed.   Constitutional:       General: She is not in acute distress.     Appearance: She is well-developed. She is not ill-appearing or toxic-appearing.   HENT:      Head: Normocephalic and atraumatic.   Eyes:      General:         Right eye: No discharge.         Left eye: No discharge.      Conjunctiva/sclera: Conjunctivae normal.   Neck:      Trachea: No tracheal deviation.   Cardiovascular:      Rate and Rhythm: Normal rate and regular rhythm.   Pulmonary:      Effort: Pulmonary effort is normal. No respiratory distress.      Breath sounds: Normal breath sounds. No stridor.   Abdominal:      General: There is no distension.      Palpations: Abdomen is soft.      Tenderness: There is no abdominal tenderness.   Musculoskeletal:         General: Tenderness present. No deformity.      Comments: Tenderness to the right lumbar paraspinal muscles.  No spinal tenderness or  overlying skin changes or swelling.   Skin:     General: Skin is warm and dry.   Neurological:      Mental Status: She is alert and oriented to person, place, and time.   Psychiatric:         Mood and Affect: Mood normal.         Behavior: Behavior normal.         ED Course        Labs Reviewed   COMP METABOLIC PANEL (14) - Abnormal; Notable for the following components:       Result Value    Glucose 136 (*)     Calculated Osmolality 297 (*)     All other components within normal limits   CBC WITH DIFFERENTIAL WITH PLATELET - Abnormal; Notable for the following components:    RBC 3.72 (*)     HGB 11.6 (*)     Lymphocyte Absolute 0.96 (*)     All other components within normal limits   URINALYSIS WITH CULTURE REFLEX - Abnormal; Notable for the following components:    Spec Gravity >1.030 (*)     Ketones Urine Trace (*)     Protein Urine 70 (*)     Leukocyte Esterase Urine 75 (*)     WBC Urine 6-10 (*)     Squamous Epi. Cells Few (*)     Hyaline Casts Present (*)     All other components within normal limits   LIPASE - Normal   URINE CULTURE, ROUTINE       As Interpreted by me    Imaging Results Available and Reviewed while in ED: No results found.  ED Medications Administered: Medications - No data to display      MDM     Vitals:    01/18/25 1159 01/18/25 1420   BP: (!) 161/97 (!) 163/83   Pulse: 92 72   Resp: 16 16   Temp: 97.7 °F (36.5 °C)    TempSrc: Oral    SpO2: 99% 99%   Weight: 83.9 kg    Height: 170.2 cm (5' 7\")      *I personally reviewed and interpreted all ED vitals.    Pulse Ox: 99%, Room air, Normal     Differential Diagnosis/ Diagnostic Considerations: Low back strain, muscular strain, other    Complicating Factors: The patient already has does not have any pertinent problems on file. to contribute to the complexity of this ED evaluation.    Medical Decision Making  Presents to the ED with likely low back strain injuries.  Nondistressed on examination.  Pain reproduced with palpation of the lumbar  muscles.  Discussed supportive care, physical therapy and continued supportive over-the-counter outpatient medicine    Problems Addressed:  Acute right-sided low back pain without sciatica: acute illness or injury  Musculoskeletal pain: acute illness or injury    Amount and/or Complexity of Data Reviewed  Labs: ordered. Decision-making details documented in ED Course.        Condition upon leaving the department: Stable    Disposition and Plan     Clinical Impression:  1. Acute right-sided low back pain without sciatica    2. Musculoskeletal pain        Disposition:  Discharge    Follow-up:  Wayne Clark DO  [address] [address]  [phone]    Schedule an appointment as soon as possible for a visit in 3 day(s)      Kwasi Almonte DO  [address] [facility] [address]  [place] [address]  [phone]    Schedule an appointment as soon as possible for a visit in 3 day(s)        Medications Prescribed:  Current Discharge Medication List                           [1] (Not in a hospital admission)

## 2025-01-18 NOTE — ED QUICK NOTES
Patient provided with discharge instructions. Verbalized understanding for plan of care at home and follow up. All question and concerns addressed prior to discharge.iv removed,catheter intact.

## 2025-01-20 ENCOUNTER — APPOINTMENT (OUTPATIENT)
Dept: CT IMAGING | Facility: HOSPITAL | Age: 55
End: 2025-01-20
Attending: EMERGENCY MEDICINE
Payer: COMMERCIAL

## 2025-01-20 ENCOUNTER — OFFICE VISIT (OUTPATIENT)
Dept: OBGYN CLINIC | Facility: CLINIC | Age: 55
End: 2025-01-20
Payer: COMMERCIAL

## 2025-01-20 ENCOUNTER — HOSPITAL ENCOUNTER (EMERGENCY)
Facility: HOSPITAL | Age: 55
Discharge: HOME | End: 2025-01-20
Attending: EMERGENCY MEDICINE
Payer: COMMERCIAL

## 2025-01-20 ENCOUNTER — APPOINTMENT (OUTPATIENT)
Dept: GENERAL RADIOLOGY | Facility: HOSPITAL | Age: 55
End: 2025-01-20
Attending: EMERGENCY MEDICINE
Payer: COMMERCIAL

## 2025-01-20 VITALS
BODY MASS INDEX: 29.03 KG/M2 | HEIGHT: 67 IN | HEART RATE: 90 BPM | SYSTOLIC BLOOD PRESSURE: 182 MMHG | WEIGHT: 185 LBS | DIASTOLIC BLOOD PRESSURE: 118 MMHG

## 2025-01-20 VITALS
SYSTOLIC BLOOD PRESSURE: 174 MMHG | WEIGHT: 185 LBS | DIASTOLIC BLOOD PRESSURE: 99 MMHG | HEART RATE: 87 BPM | RESPIRATION RATE: 15 BRPM | OXYGEN SATURATION: 97 % | TEMPERATURE: 97 F | BODY MASS INDEX: 29.03 KG/M2 | HEIGHT: 67 IN

## 2025-01-20 DIAGNOSIS — I10 ELEVATED BLOOD PRESSURE READING WITH DIAGNOSIS OF HYPERTENSION: Primary | ICD-10-CM

## 2025-01-20 DIAGNOSIS — Z01.411 ENCOUNTER FOR GYNECOLOGICAL EXAMINATION WITH ABNORMAL FINDING: Primary | ICD-10-CM

## 2025-01-20 DIAGNOSIS — I51.7 CARDIOMEGALY: ICD-10-CM

## 2025-01-20 DIAGNOSIS — Z11.3 SCREEN FOR STD (SEXUALLY TRANSMITTED DISEASE): ICD-10-CM

## 2025-01-20 DIAGNOSIS — D64.9 ANEMIA, UNSPECIFIED TYPE: ICD-10-CM

## 2025-01-20 DIAGNOSIS — R07.9 CHEST PAIN OF UNCERTAIN ETIOLOGY: ICD-10-CM

## 2025-01-20 DIAGNOSIS — I10 UNCONTROLLED HYPERTENSION: ICD-10-CM

## 2025-01-20 LAB
ANION GAP SERPL CALC-SCNC: 9 MMOL/L (ref 0–18)
BASOPHILS # BLD AUTO: 0.02 X10(3) UL (ref 0–0.2)
BASOPHILS NFR BLD AUTO: 0.3 %
BUN BLD-MCNC: 14 MG/DL (ref 9–23)
BUN/CREAT SERPL: 17.1 (ref 10–20)
CALCIUM BLD-MCNC: 9.9 MG/DL (ref 8.7–10.4)
CHLORIDE SERPL-SCNC: 107 MMOL/L (ref 98–112)
CO2 SERPL-SCNC: 29 MMOL/L (ref 21–32)
CREAT BLD-MCNC: 0.82 MG/DL
D DIMER PPP FEU-MCNC: 2.99 UG/ML FEU (ref ?–0.52)
DEPRECATED RDW RBC AUTO: 43.4 FL (ref 35.1–46.3)
EGFRCR SERPLBLD CKD-EPI 2021: 86 ML/MIN/1.73M2 (ref 60–?)
EOSINOPHIL # BLD AUTO: 0.15 X10(3) UL (ref 0–0.7)
EOSINOPHIL NFR BLD AUTO: 2.4 %
ERYTHROCYTE [DISTWIDTH] IN BLOOD BY AUTOMATED COUNT: 12.2 % (ref 11–15)
GLUCOSE BLD-MCNC: 97 MG/DL (ref 70–99)
HCT VFR BLD AUTO: 34.6 %
HGB BLD-MCNC: 11 G/DL
IMM GRANULOCYTES # BLD AUTO: 0.02 X10(3) UL (ref 0–1)
IMM GRANULOCYTES NFR BLD: 0.3 %
LYMPHOCYTES # BLD AUTO: 0.95 X10(3) UL (ref 1–4)
LYMPHOCYTES NFR BLD AUTO: 15.5 %
MCH RBC QN AUTO: 30.8 PG (ref 26–34)
MCHC RBC AUTO-ENTMCNC: 31.8 G/DL (ref 31–37)
MCV RBC AUTO: 96.9 FL
MONOCYTES # BLD AUTO: 0.64 X10(3) UL (ref 0.1–1)
MONOCYTES NFR BLD AUTO: 10.4 %
NEUTROPHILS # BLD AUTO: 4.35 X10 (3) UL (ref 1.5–7.7)
NEUTROPHILS # BLD AUTO: 4.35 X10(3) UL (ref 1.5–7.7)
NEUTROPHILS NFR BLD AUTO: 71.1 %
OSMOLALITY SERPL CALC.SUM OF ELEC: 300 MOSM/KG (ref 275–295)
PLATELET # BLD AUTO: 282 10(3)UL (ref 150–450)
POTASSIUM SERPL-SCNC: 4.2 MMOL/L (ref 3.5–5.1)
RBC # BLD AUTO: 3.57 X10(6)UL
SODIUM SERPL-SCNC: 145 MMOL/L (ref 136–145)
TROPONIN I SERPL HS-MCNC: <3 NG/L
TROPONIN I SERPL HS-MCNC: <3 NG/L
WBC # BLD AUTO: 6.1 X10(3) UL (ref 4–11)

## 2025-01-20 PROCEDURE — 85025 COMPLETE CBC W/AUTO DIFF WBC: CPT | Performed by: EMERGENCY MEDICINE

## 2025-01-20 PROCEDURE — 71045 X-RAY EXAM CHEST 1 VIEW: CPT | Performed by: EMERGENCY MEDICINE

## 2025-01-20 PROCEDURE — 80048 BASIC METABOLIC PNL TOTAL CA: CPT | Performed by: EMERGENCY MEDICINE

## 2025-01-20 PROCEDURE — 3077F SYST BP >= 140 MM HG: CPT | Performed by: OBSTETRICS & GYNECOLOGY

## 2025-01-20 PROCEDURE — 99285 EMERGENCY DEPT VISIT HI MDM: CPT

## 2025-01-20 PROCEDURE — 84484 ASSAY OF TROPONIN QUANT: CPT | Performed by: EMERGENCY MEDICINE

## 2025-01-20 PROCEDURE — 96374 THER/PROPH/DIAG INJ IV PUSH: CPT

## 2025-01-20 PROCEDURE — 93005 ELECTROCARDIOGRAM TRACING: CPT

## 2025-01-20 PROCEDURE — 85379 FIBRIN DEGRADATION QUANT: CPT | Performed by: EMERGENCY MEDICINE

## 2025-01-20 PROCEDURE — 71260 CT THORAX DX C+: CPT | Performed by: EMERGENCY MEDICINE

## 2025-01-20 PROCEDURE — 99396 PREV VISIT EST AGE 40-64: CPT | Performed by: OBSTETRICS & GYNECOLOGY

## 2025-01-20 PROCEDURE — 3008F BODY MASS INDEX DOCD: CPT | Performed by: OBSTETRICS & GYNECOLOGY

## 2025-01-20 PROCEDURE — 93010 ELECTROCARDIOGRAM REPORT: CPT

## 2025-01-20 PROCEDURE — 3080F DIAST BP >= 90 MM HG: CPT | Performed by: OBSTETRICS & GYNECOLOGY

## 2025-01-20 RX ORDER — KETOROLAC TROMETHAMINE 15 MG/ML
15 INJECTION, SOLUTION INTRAMUSCULAR; INTRAVENOUS ONCE
Status: COMPLETED | OUTPATIENT
Start: 2025-01-20 | End: 2025-01-20

## 2025-01-21 ENCOUNTER — TELEPHONE (OUTPATIENT)
Dept: FAMILY MEDICINE CLINIC | Facility: CLINIC | Age: 55
End: 2025-01-21

## 2025-01-21 LAB
ATRIAL RATE: 81 BPM
C TRACH DNA SPEC QL NAA+PROBE: NEGATIVE
N GONORRHOEA DNA SPEC QL NAA+PROBE: NEGATIVE
P AXIS: 59 DEGREES
P-R INTERVAL: 138 MS
Q-T INTERVAL: 386 MS
QRS DURATION: 76 MS
QTC CALCULATION (BEZET): 448 MS
R AXIS: -9 DEGREES
T AXIS: 0 DEGREES
VENTRICULAR RATE: 81 BPM

## 2025-01-21 NOTE — TELEPHONE ENCOUNTER
Informed patient about the earlier appointment and agreed.   Patient's  schedule has been changed and updated.      Future Appointments   Date Time Provider Department Center   1/23/2025  4:30 PM Wayne Clark DO [facility] [place]

## 2025-01-21 NOTE — PROGRESS NOTES
Paula Brooks is a 54 year old female  Patient's last menstrual period was 11/15/2021 (approximate).   Chief Complaint   Patient presents with    Gyn Exam     ANNUAL EXAM -- gets nervous now w/ MD visits. Got diagnosis w/ mixed CT -- does have HA today -- not responding to tylenol. Has appointment w/ PCP in one week since feels BP meds not working    Sexually Transmitted Infection Screen     Wishes for full screen   .    OBSTETRICS HISTORY:     OB History    Para Term  AB Living   2 1 1 0 1 1   SAB IAB Ectopic Multiple Live Births   1 0 0 0 1      # Outcome Date GA Lbr Alejandro/2nd Weight Sex Type Anes PTL Lv   2 SAB            1 Term     M NORMAL SPONT   BETO       GYNE HISTORY:     Periods none due to menopause      BCM:  Essure    History   Sexual Activity    Sexual activity: Yes    Partners: Male    Birth control/ protection: Implant     Comment: ESSURE        Menarche: 15 years old   Use of Birth Control (if yes, specify type): Essure/OCP  Hx Prior Abnormal Pap: No  Pap Date: 23  Pap Result Notes: NEG PAP / NEG HPV  Follow Up Recommendation: MAMMO BILATERAL 24 BENIGN          Latest Ref Rng & Units 11/3/2023     4:08 PM 2019     2:01 PM   RECENT PAP RESULTS   INTERPRETATION/RESULT: Negative for intraepithelial lesion or malignancy Negative for intraepithelial lesion or malignancy  Negative for intraepithelial lesion or malignancy    HPV Negative Negative  Negative          MEDICAL HISTORY:     Past Medical History:    Allergy    allergies    Amenorrhea    Asthma (HCC)    Decorative tattoo    Diabetes (HCC)    Essential hypertension    High blood pressure    High cholesterol    Hypercholesteremia    Hyperlipidemia    Unspecified disorder of neck    overactive gland in neck, surgical removal      Past Surgical History:   Procedure Laterality Date    Hysteroscopy, sterilization  2017    Essure    Incisional biopsy skin single lesion Left 2020    left shoulder lipoma     Sara biopsy stereo nodule 1 site left (cpt=19081)  2023    for focal asymetry; X clip    Sara localization wire 1 site left (cpt=19281) Left 2024    vision clip    Neck/chest procedure unlisted      surgical removal (overactive gland in neck)    Needle biopsy left Left 2023    US CNB    Wrist arthroscop,release xvers lig Right 12/10/2021    Right endoscopic carpal tunnel release     OB History    Para Term  AB Living   2 1 1 0 1 1   SAB IAB Ectopic Multiple Live Births   1 0 0 0 1        SOCIAL HISTORY:     Tobacco Use: Low Risk  (2025)    Patient History     Smoking Tobacco Use: Never     Smokeless Tobacco Use: Never     Passive Exposure: Not on file       FAMILY HISTORY:     Family History   Problem Relation Age of Onset    Diabetes Mother     Cancer Mother         lung CA    Heart Disease Father     Allergies Son     No Known Problems Sister     No Known Problems Brother     No Known Problems Brother     No Known Problems Brother          MEDICATIONS:       Current Outpatient Medications:     naproxen 500 MG Oral Tab, Take 1 tablet (500 mg total) by mouth 2 (two) times daily with meals., Disp: 60 tablet, Rfl: 0    valsartan 80 MG Oral Tab, Take 1 tablet (80 mg total) by mouth daily., Disp: 30 tablet, Rfl: 11    rosuvastatin 10 MG Oral Tab, Take 1 tablet (10 mg total) by mouth nightly., Disp: 90 tablet, Rfl: 1    valsartan 160 MG Oral Tab, Take 1 tablet (160 mg total) by mouth daily., Disp: 30 tablet, Rfl: 11    pantoprazole 40 MG Oral Tab EC, Take 1 tablet (40 mg total) by mouth daily as needed., Disp: 30 tablet, Rfl: 5    zolpidem 10 MG Oral Tab, Take 1 tablet (10 mg total) by mouth nightly as needed., Disp: 30 tablet, Rfl: 5    methotrexate 2.5 MG Oral Tab, Take 6 tablets (15 mg total) by mouth once a week. (Patient not taking: Reported on 1/15/2025), Disp: 78 tablet, Rfl: 1    Tirzepatide (MOUNJARO) 5 MG/0.5ML Subcutaneous Solution Pen-injector, Inject 5 mg into the skin  once a week., Disp: 2 mL, Rfl: 3    folic acid 1 MG Oral Tab, Take 1 tablet (1 mg total) by mouth daily. (Patient not taking: Reported on 1/15/2025), Disp: 90 tablet, Rfl: 2    albuterol (PROAIR HFA) 108 (90 Base) MCG/ACT Inhalation Aero Soln, Inhale 2 puffs into the lungs every 6 (six) hours as needed for Wheezing., Disp: 1 each, Rfl: 1    Glucose Blood (ONETOUCH VERIO) In Vitro Strip, CHECK SUGARS TWICE DAILY, Disp: 200 strip, Rfl: 0    EPINEPHrine (EPIPEN 2-YANY) 0.3 MG/0.3ML Injection Solution Auto-injector, Inject IM in event of  allergic reaction, Disp: 1 each, Rfl: 0    Blood Glucose Monitoring Suppl (ONETOUCH VERIO FLEX SYSTEM) w/Device Does not apply Kit, 1 each As Directed. To check sugars twice a day E 11.65 with insulin use, Disp: 1 kit, Rfl: 0    OneTouch Delica Lancets 33G Does not apply Misc, To check sugars twice a day E 11.65 with insulin use, Disp: 200 each, Rfl: 0    Blood Glucose Monitoring Suppl Does not apply Kit, Use as directed to check blood glucose twice a day - fasting and before dinner, Disp: 1 kit, Rfl: 0    Blood Glucose Monitoring Suppl Does not apply Device, Use as directed to check blood glucose twice a day -fasting and before dinner, Disp: 200 each, Rfl: 0    Microlet Lancets Does not apply Misc, Use as directed to check blood glucose twice a day - fasting and before dinner, Disp: 200 each, Rfl: 0    ALLERGIES:     Allergies[1]      REVIEW OF SYSTEMS:     Constitutional:    denies fever / chills  Eyes:     denies blurred or double vision  Cardiovascular:  denies chest pain or palpitations  Respiratory:    denies shortness of breath  Gastrointestinal:  denies severe abdominal pain, frequent diarrhea or constipation, nausea / vomiting  Genitourinary:    denies dysuria, bothersome incontinence  Skin/Breast:   denies any breast pain, lumps, or discharge  Neurological:    denies frequent severe headaches  Psychiatric:   denies depression or anxiety, thoughts of harming self or  others  Heme/Lymph:    denies easy bruising or bleeding      PHYSICAL EXAM:   Blood pressure (!) 182/118, pulse 90, height 5' 7\" (1.702 m), weight 185 lb (83.9 kg), last menstrual period 11/15/2021, not currently breastfeeding.  Constitutional:  well developed, well nourished  Head/Face:  normocephalic  Neck/Thyroid: thyroid symmetric, no thyromegaly, no nodules, no adenopathy  Lymphatic: no abnormal supraclavicular or axillary adenopathy is noted  Breast:   normal without palpable masses, tenderness, asymmetry, nipple discharge, nipple retraction or skin changes  Abdomen:   soft, nontender, nondistended, no masses  Skin/Hair:  no unusual rashes or bruises  Extremities:  no edema, no cyanosis  Psychiatric:   oriented to time, place, person and situation. Appropriate mood and affect    Pelvic Exam:  External Genitalia:  normal appearance, hair distribution, and no lesions  Urethral Meatus:   normal in size, location, without lesions and prolapse  Bladder:    no fullness, masses or tenderness  Vagina:    normal appearance without lesions, no abnormal discharge  Cervix:    normal without tenderness on motion  Uterus:    normal in size, contour, position, mobility, without tenderness  Adnexa:   normal without masses or tenderness  Perineum:   normal  Anus: no hemorroids         ASSESSMENT & PLAN:     Paula was seen today for gyn exam and sexually transmitted infection screen.    Diagnoses and all orders for this visit:    Encounter for gynecological examination with abnormal finding    Screen for STD (sexually transmitted disease)  -     HCV Antibody; Future  -     Hepatitis B Surface Antigen; Future  -     HIV Ag/Ab Combo; Future  -     T Pallidum Screening Licking; Future    Uncontrolled hypertension    To ER immediately -- warned pt of stroke risk. Pt agrees    SUMMARY:  Pap: Next cotest 11/26-28 per ASCCP guidelines. -- noted after exam, pt on methotrexate -- per pt does not know if long term -- if on long term,  will need yearly paps  BCM:  Harman  STD screening: GC/Chl/Trich/HepB/HepC/HIV/RPR, condoms encouraged  Mammogram: done recently -- per pt follow up in 6 mos  HM updated  Depression screen:   Depression Screening (PHQ-2/PHQ-9): Over the LAST 2 WEEKS   Little interest or pleasure in doing things: Not at all    Feeling down, depressed, or hopeless: Not at all    PHQ-2 SCORE: 0          FOLLOW-UP     Return in about 1 year (around 1/20/2026) for annual gyne exam.    Note to patient and family:  The 21st Century Cures Act makes medical notes available to patients in the interest of transparency.  However, please be advised that this is a medical document.  It is intended as a peer to peer communication.  It is written in medical language and may contain abbreviations or verbiage that are technical and unfamiliar.  It may appear blunt or direct.  Medical documents are intended to carry relevant information, facts as evident, and the clinical opinion of the practitioner.         [1]   Allergies  Allergen Reactions    Iodine (Topical) ANAPHYLAXIS, HIVES and UNKNOWN     Eyes swell, welts on body    Shellfish HIVES

## 2025-01-21 NOTE — ED INITIAL ASSESSMENT (HPI)
Pt arrives through triage with       complaints of elevated BP at md office. +Ha, mild ache on her chest when taking a deep breath.    Primary md advised to come to ed to monitor/ control bp    Hx of htn

## 2025-01-21 NOTE — DISCHARGE INSTRUCTIONS
Thank you for seeking care at [facility] Emergency Department.  You have been seen and evaluated and noted to have elevated blood pressures.  You were also evaluated in the setting of chest pain.  Your bedside ultrasound showed some mild thickening of the ventricles of your heart which needs follow-up with a cardiologist.  They should be contacting you tomorrow to schedule this follow-up.   We reviewed the results from your visit in the emergency department.    Please read the instructions provided   If provided, take prescriptions as instructed.     You were found to have high blood pressure today and you need to have that rechecked by a primary doctor at a later date. Your physician may discuss lifestyle modifications including weight reduction, dietary sodium restriction, increased physical activity, and moderation in alcohol consumptions. If possible, please check your blood pressure at home and keep a blood pressure log to bring to your follow up visit with your physician..  Please read the instructions provided, and if given prescriptions, take as instructed.     Remember, your care process does not end after your visit today. Please follow-up with your doctor within 1-2 days for a follow-up check to ensure you are  improving, to see if you need any further evaluation/testing, or to evaluate for any alternate diagnoses.     Please return to the emergency department if you develop chest pain, shortness of breath, headaches, numbness/tingling, weakness, changes in speech, or if you develop any other new or concerning symptoms as these could be signs of more serious medical illness.    We hope you feel better.

## 2025-01-21 NOTE — TELEPHONE ENCOUNTER
Patient went to the ER yesterday for headache and high blood pressure.  Patient scheduled ER follow up on 1/27/25, requesting for a sooner appointment this week. Offered other providers in the office. Patient declined. Only wants to follow up with Dr. Clark.  Please advise if ok to use [facility]?

## 2025-01-21 NOTE — ED PROVIDER NOTES
Patient Seen in: [facility] Emergency Department    History     Chief Complaint   Patient presents with    Hypertension    Headache     Stated Complaint: High blood pressure, headache    HPI    54 year old female hx of HTN, HLD, DM presenting with elevated blood pressure.  Patient states that she was at her OB/GYN's office earlier today for an appointment and they noted her to have significantly elevated blood pressures.  She states this has been an issue for several weeks now despite taking her blood pressure medications as prescribed.  She states that several hours ago she had a brief episode of left-sided chest pressure that was nonradiating.  Currently she denies any chest pain.  Denies shortness of breath or lightheadedness.  No numbness weakness or tingling her extremities, no slurred speech, vision changes, double vision, or facial droop.  She does note that she has had a mild left-sided headache today.  No thunderclap or worst of life.  No recent head trauma or fall.  States that headache started before her OB/GYN's appointment earlier today.  She otherwise denies abdominal pain, dysuria, nausea, vomiting, diarrhea, fevers, cough or other complaints.      Past Medical History:    Allergy    allergies    Amenorrhea    Asthma (HCC)    Decorative tattoo    Diabetes (HCC)    Essential hypertension    High blood pressure    High cholesterol    Hypercholesteremia    Hyperlipidemia    Unspecified disorder of neck    overactive gland in neck, surgical removal 1991       Past Surgical History:   Procedure Laterality Date    Hysteroscopy, sterilization  2017    Essure    Incisional biopsy skin single lesion Left 09/23/2020    left shoulder lipoma    Sara biopsy stereo nodule 1 site left (cpt=19081)  12/08/2023    for focal asymetry; X clip    Sara localization wire 1 site left (cpt=19281) Left 02/22/2024    vision clip    Neck/chest procedure unlisted  1991    surgical removal (overactive gland in neck)     Needle biopsy left Left 12/20/2023    US CNB    Wrist arthroscop,release xvers lig Right 12/10/2021    Right endoscopic carpal tunnel release            Family History   Problem Relation Age of Onset    Diabetes Mother     Cancer Mother         lung CA    Heart Disease Father     Allergies Son     No Known Problems Sister     No Known Problems Brother     No Known Problems Brother     No Known Problems Brother        Social History     Socioeconomic History    Marital status: Single   Tobacco Use    Smoking status: Never    Smokeless tobacco: Never   Vaping Use    Vaping status: Never Used   Substance and Sexual Activity    Alcohol use: Yes     Comment: occasionally    Drug use: No    Sexual activity: Yes     Partners: Male     Birth control/protection: Implant     Comment: ESSURE   Other Topics Concern    Caffeine Concern Yes     Comment: coffee, 1/2 cup daily    Right Handed Yes       Review of Systems    Positive for stated complaint: High blood pressure, headache  Other systems are as noted in HPI.  Constitutional and vital signs reviewed.      All other systems reviewed and negative except as noted above.    PSFH elements reviewed from today and agreed except as otherwise stated in HPI.    Physical Exam     ED Triage Vitals [01/20/25 1814]   BP (!) 194/121   Pulse 84   Resp 18   Temp 97.3 °F (36.3 °C)   Temp src Temporal   SpO2 99 %   O2 Device None (Room air)       Current:BP (!) 174/99   Pulse 87   Temp 97.3 °F (36.3 °C) (Temporal)   Resp 15   Ht 170.2 cm (5' 7\")   Wt 83.9 kg   LMP 11/15/2021 (Approximate)   SpO2 97%   BMI 28.98 kg/m²   PULSE OX 99% on RA         Physical Exam    Constitutional: appears well, no distress. Ambulates with steady gait.   HENT: mmm, no lesions,  Neck: normal range of motion, no tenderness, supple.  Eyes: PERRL, EOMI, conjunctiva normal, no discharge. Sclera anicteric.  Cardiovascular: RRR no MRG, +2 radial and dp pulses bilaterally   Respiratory: Normal breath sounds, no  respiratory distress, no wheezing  GI: Bowel sounds normal, Soft, no tenderness, no masses, no pulsatile masses.  : No CVA tenderness.  Skin: Warm, dry, no erythema, no rash.  Musculoskeletal: Intact distal pulses, no arm or leg edema, no tenderness, no cyanosis, no clubbing.   Neurologic: Alert & oriented x 3  CN 2 through 12 intact bilaterally, strength is 5/5 bilateral handgrip and elbow flexion extension, bilateral hip flexion, and ankle plantar and dorsiflexion.  Sensation intact light touch symmetric bilateral upper and lower extremities.  Finger-nose intact bilaterally.  Steady gait when ambulating.  Psych: Calm, cooperative, nl affect        ED Course     Labs Reviewed   CBC WITH DIFFERENTIAL WITH PLATELET - Abnormal; Notable for the following components:       Result Value    RBC 3.57 (*)     HGB 11.0 (*)     HCT 34.6 (*)     Lymphocyte Absolute 0.95 (*)     All other components within normal limits   BASIC METABOLIC PANEL (8) - Abnormal; Notable for the following components:    Calculated Osmolality 300 (*)     All other components within normal limits   D-DIMER - Abnormal; Notable for the following components:    D-Dimer 2.99 (*)     All other components within normal limits   TROPONIN I HIGH SENSITIVITY - Normal   TROPONIN I HIGH SENSITIVITY - Normal     EKG  Ecg my interpretation: nsr rate 81 bpm, L axis deviation, minimal voltage criteria for LVH, no stemi, qtc 448 ms. In comparison to prior ECG from February of 2024, no acute ST segment or interval changes, though minimal voltage criteria for LVH is new compared to prior     MDM     Monitor Interpretation:  Sinus rhythm         HEART score for chest pain  History- (Highly suspicious 2pt, Mod 1pt, slightly 0pt)        0  ECG- (significant ST deviation 2pt, Non spec 1pt, nl 0pt)  0  AGE- (>65 2pt, 45-64 1 pt, Under 45 0 pt)    1  Risk Factors- ( DM,HTN,Chol, fhx CAD, BMI>30, hx CAD)  ( >3 or hx CAD 2pt, 1-2 risks 1pt, None 0pt)  2  Troponin- ( 3 times  nl 2pt, 2 times nl 1pt, nl 0pt   3         TOTAL  3    SCORE 0-3: 2.5% MACE over next 6 weeks consider D/C outpatient F/U  SCORE 4-6: 20.3% MACE over next 6 weeks consider admit  SCORE 7-10:72.7% MACE over next 6 weeks consider early invasive strategy.    Patient has a HEART score of 3, plan will be outpatient follow up.    Alex AJ, Vinod BE, Lorri MATEO. Chest pain in the emergency room: value of the HEART score. [facility] Heart J. 2008 [place];16(6):191-6. PubMed PMID: 35989052; PubMed Central PMCID: JXC9881084.  Aortic Dissection Risk Assesment:    High risk Conditions (Marfan, Fhx,Known aortic valve disease, known dissection or recent aortic manipulation) no  High Risk Pain Features (Severe. Abrupt Ripping or tearing) no  High Risk Exam Features (pulse deficit, BP difference, focal neuro deficit,murmur of aortic insufficiency, hypotension or shock) no            MDM      This patient presents to the emergency department complaining of high blood pressure.?  Patient is otherwise asymptomatic without confusion, chest pain, hematuria, or SOB.?Neurologic exam is nonfocal, patient without signs of fluid overload on exam.   BP today in the department is 190/120s on arrival, but on my assessment without intervention BP 170s/90-100s. Patient is  currently on antihypertensive medication.   Based on patient presentation, I have low suspicion for acute MI, heart failure, kidney failure, acute stroke, or other end organ damage. Will obtain cbc, cmp, troponin, d-dimer, CXR, ECG. If stable will plan for discharge home with close outpatient follow up.   ED Course as of 01/21/25 0122  ------------------------------------------------------------  Time: 01/20 1914  Comment: Cxr my interpretation no focal lung consolidation, no widened mediastinum   ------------------------------------------------------------  Time: 01/20 1917  Comment:      FINDINGS:  CARDIAC/VASC: Mild cardiomegaly.  Normal pulmonary vascularity.  MEDIAST/BRITTANEY:   No  visible mass or adenopathy.  LUNGS/PLEURA: No consolidation or pleural effusion.  BONES: No fracture or visible bony lesion.  OTHER: Negative.                Impression  CONCLUSION:  1. Mild cardiomegaly.  No acute cardiopulmonary finding.    ------------------------------------------------------------  Time: 01/20 1919  Value: BP(!): 135/113  Comment: (Reviewed)  ------------------------------------------------------------  Time: 01/20 1926  Value: Hemoglobin(!): 11.0  Comment: mild anemia noted. Similar to labs 2d ago.   ------------------------------------------------------------  Time: 01/20 2025  Comment: Bedside cardiac ultrasound shows mild LVH however no regional wall motion abnormality, no obvious pericardial effusion.  Discussed results with the patient.  Pending repeat troponin given her onset of chest pain earlier today and CT PE will plan for likely discharge with outpatient cardiology follow-up.  Sent message to [facility] team they will call her. Pending rpt trop and CTPE plan for outpatient. Dr. Vegas accepted signout will f/u trop/CT for discharge.          Disposition and Plan     Clinical Impression:  1. Elevated blood pressure reading with diagnosis of hypertension    2. Chest pain of uncertain etiology    3. Cardiomegaly    4. Anemia, unspecified type         Disposition:  Discharge  1/20/2025  9:17 pm    Follow-up:  Wayne Clark DO  [address]  [facility] [address] [phone]    Schedule an appointment as soon as possible for a visit in 2 day(s)      [facility] Emergency Department  [address] [facility] [address] [phone]  Go to  If symptoms worsen, immediately    Kareem Dos Santos MD  [address]  [facility] [address]  [phone]    Schedule an appointment as soon as possible for a visit in 1 week(s)            Medications Prescribed:  Discharge Medication List as of 1/20/2025  9:19 PM              Supplementary Documentation:                                      Geni Myers, DO  Attending Physician  Emergency Medicine

## 2025-01-22 LAB — T VAGINALIS RRNA SPEC QL NAA+PROBE: NEGATIVE

## 2025-01-23 ENCOUNTER — LAB ENCOUNTER (OUTPATIENT)
Dept: LAB | Age: 55
End: 2025-01-23
Attending: OBSTETRICS & GYNECOLOGY
Payer: COMMERCIAL

## 2025-01-23 ENCOUNTER — OFFICE VISIT (OUTPATIENT)
Dept: FAMILY MEDICINE CLINIC | Facility: CLINIC | Age: 55
End: 2025-01-23
Payer: COMMERCIAL

## 2025-01-23 VITALS
DIASTOLIC BLOOD PRESSURE: 92 MMHG | BODY MASS INDEX: 29 KG/M2 | SYSTOLIC BLOOD PRESSURE: 147 MMHG | HEART RATE: 89 BPM | HEIGHT: 67 IN

## 2025-01-23 DIAGNOSIS — I10 ESSENTIAL HYPERTENSION: Primary | ICD-10-CM

## 2025-01-23 DIAGNOSIS — Z11.3 SCREEN FOR STD (SEXUALLY TRANSMITTED DISEASE): ICD-10-CM

## 2025-01-23 LAB
HBV SURFACE AG SER-ACNC: <0.1 [IU]/L
HBV SURFACE AG SERPL QL IA: NONREACTIVE
HCV AB SERPL QL IA: NONREACTIVE
T PALLIDUM AB SER QL IA: NONREACTIVE

## 2025-01-23 PROCEDURE — 87389 HIV-1 AG W/HIV-1&-2 AB AG IA: CPT

## 2025-01-23 PROCEDURE — 99214 OFFICE O/P EST MOD 30 MIN: CPT | Performed by: FAMILY MEDICINE

## 2025-01-23 PROCEDURE — 86780 TREPONEMA PALLIDUM: CPT

## 2025-01-23 PROCEDURE — 3077F SYST BP >= 140 MM HG: CPT | Performed by: FAMILY MEDICINE

## 2025-01-23 PROCEDURE — 87340 HEPATITIS B SURFACE AG IA: CPT

## 2025-01-23 PROCEDURE — 36415 COLL VENOUS BLD VENIPUNCTURE: CPT

## 2025-01-23 PROCEDURE — 3080F DIAST BP >= 90 MM HG: CPT | Performed by: FAMILY MEDICINE

## 2025-01-23 PROCEDURE — 86803 HEPATITIS C AB TEST: CPT

## 2025-01-23 RX ORDER — AMLODIPINE BESYLATE 5 MG/1
5 TABLET ORAL DAILY
Qty: 30 TABLET | Refills: 0 | Status: SHIPPED | OUTPATIENT
Start: 2025-01-23

## 2025-01-23 NOTE — PROGRESS NOTES
Subjective:   Paula Brooks is a 54 year old female who presents for Blood Pressure and ER F/U (E/R follow up for elevated blood pressure )       History/Other:    Chief Complaint Reviewed and Verified  Nursing Notes Reviewed and   Verified  Allergies Reviewed  Problem List Reviewed  OB Status Reviewed           Tobacco:  She has never smoked tobacco.    Current Outpatient Medications   Medication Sig Dispense Refill    amLODIPine 5 MG Oral Tab Take 1 tablet (5 mg total) by mouth daily. 30 tablet 0    valsartan 80 MG Oral Tab Take 1 tablet (80 mg total) by mouth daily. 30 tablet 11    valsartan 160 MG Oral Tab Take 1 tablet (160 mg total) by mouth daily. 30 tablet 11    pantoprazole 40 MG Oral Tab EC Take 1 tablet (40 mg total) by mouth daily as needed. 30 tablet 5    zolpidem 10 MG Oral Tab Take 1 tablet (10 mg total) by mouth nightly as needed. 30 tablet 5    methotrexate 2.5 MG Oral Tab Take 6 tablets (15 mg total) by mouth once a week. 78 tablet 1    folic acid 1 MG Oral Tab Take 1 tablet (1 mg total) by mouth daily. 90 tablet 2    albuterol (PROAIR HFA) 108 (90 Base) MCG/ACT Inhalation Aero Soln Inhale 2 puffs into the lungs every 6 (six) hours as needed for Wheezing. 1 each 1    naproxen 500 MG Oral Tab Take 1 tablet (500 mg total) by mouth 2 (two) times daily with meals. (Patient not taking: Reported on 1/23/2025) 60 tablet 0    rosuvastatin 10 MG Oral Tab Take 1 tablet (10 mg total) by mouth nightly. (Patient not taking: Reported on 1/23/2025) 90 tablet 1    Tirzepatide (MOUNJARO) 5 MG/0.5ML Subcutaneous Solution Pen-injector Inject 5 mg into the skin once a week. (Patient not taking: Reported on 1/23/2025) 2 mL 3    Glucose Blood (ONETOUCH VERIO) In Vitro Strip CHECK SUGARS TWICE DAILY (Patient not taking: Reported on 1/23/2025) 200 strip 0    EPINEPHrine (EPIPEN 2-YANY) 0.3 MG/0.3ML Injection Solution Auto-injector Inject IM in event of  allergic reaction (Patient not taking: Reported on 1/23/2025) 1  each 0    Blood Glucose Monitoring Suppl (ONETOUCH VERIO FLEX SYSTEM) w/Device Does not apply Kit 1 each As Directed. To check sugars twice a day E 11.65 with insulin use 1 kit 0    OneTouch Delica Lancets 33G Does not apply Misc To check sugars twice a day E 11.65 with insulin use 200 each 0    Blood Glucose Monitoring Suppl Does not apply Kit Use as directed to check blood glucose twice a day - fasting and before dinner 1 kit 0    Blood Glucose Monitoring Suppl Does not apply Device Use as directed to check blood glucose twice a day -fasting and before dinner 200 each 0    Microlet Lancets Does not apply Misc Use as directed to check blood glucose twice a day - fasting and before dinner 200 each 0         Review of Systems:  Review of Systems   Constitutional: Negative.    Respiratory: Negative.     Cardiovascular: Negative.    Neurological:  Negative for dizziness and headaches.         Objective:   BP (!) 147/92   Pulse 89   Ht 5' 7\" (1.702 m)   LMP 11/15/2021 (Approximate)   BMI 28.98 kg/m²  Estimated body mass index is 28.98 kg/m² as calculated from the following:    Height as of this encounter: 5' 7\" (1.702 m).    Weight as of 1/20/25: 185 lb (83.9 kg).  Physical Exam  Vitals reviewed.   Cardiovascular:      Rate and Rhythm: Normal rate and regular rhythm.       150/90    Assessment & Plan:   1. Essential hypertension (Primary)  Other orders  -     amLODIPine Besylate; Take 1 tablet (5 mg total) by mouth daily.  Dispense: 30 tablet; Refill: 0  Remove valsartan 80mg and add amlodipine 5mg to the 160mg valsartan dose.  Report back BP readings after 2 weeks.       Return in about 2 years (around 1/23/2027).    Wayne Clark DO, 1/23/2025, 5:12 PM

## 2025-01-24 ENCOUNTER — PATIENT MESSAGE (OUTPATIENT)
Dept: ENDOCRINOLOGY CLINIC | Facility: CLINIC | Age: 55
End: 2025-01-24

## 2025-01-24 DIAGNOSIS — E11.9 CONTROLLED TYPE 2 DIABETES MELLITUS WITHOUT COMPLICATION, WITHOUT LONG-TERM CURRENT USE OF INSULIN (HCC): Primary | ICD-10-CM

## 2025-01-27 ENCOUNTER — TELEPHONE (OUTPATIENT)
Dept: ENDOCRINOLOGY CLINIC | Facility: CLINIC | Age: 55
End: 2025-01-27

## 2025-01-27 ENCOUNTER — TELEPHONE (OUTPATIENT)
Dept: RHEUMATOLOGY | Facility: CLINIC | Age: 55
End: 2025-01-27

## 2025-01-27 RX ORDER — TIRZEPATIDE 5 MG/.5ML
5 INJECTION, SOLUTION SUBCUTANEOUS WEEKLY
Qty: 6 ML | Refills: 1 | Status: SHIPPED | OUTPATIENT
Start: 2025-01-27

## 2025-01-27 NOTE — TELEPHONE ENCOUNTER
Patient indicates [employer] has sent e-script for Mounjaro. Please update at [phone],thanks.

## 2025-01-27 NOTE — TELEPHONE ENCOUNTER
Current Outpatient Medications   Medication Sig Dispense Refill    Benadryl/Maalox/Lidocaine 1:1:1 solution Take 5-10 mL by mouth TID AC&HS. Swish and Smallow or Swish and Spit 450 mL 1       Magic mouthwash is commercially available so it has to the firs-magic mouthwash product but neither is covered (this one or if we were to compound it) what would  Like to change it to?

## 2025-01-27 NOTE — TELEPHONE ENCOUNTER
Script sent per protocol. Patient notified.     Endocrine Refill protocol for oral and injectable diabetic medications    Protocol Criteria:  PASSED  Reason: N/A    If all below requirements are met, send a 90-day supply with 1 refill per provider protocol.    Verify appointment with Endocrinology completed in the last 6 months or scheduled in the next 3 months.  Verify A1C has been completed within the last 6 months and is below 8.5%     Last completed office visit: 10/28/2024 Marylin Harrington APRN   Next scheduled Follow up:   Future Appointments   Date Time Provider Department Center   4/30/2025  9:40 AM Sandi Bueno MD [facility]      Last A1c result: Last A1c value was 5.7% done 10/28/2024.

## 2025-01-28 ENCOUNTER — TELEPHONE (OUTPATIENT)
Dept: ENDOCRINOLOGY CLINIC | Facility: CLINIC | Age: 55
End: 2025-01-28

## 2025-01-28 NOTE — TELEPHONE ENCOUNTER
Ok to do whatever is covered - so what's the options here? Does patient want to pay for either - or does the pharmacy have a suggestion ?   I can sent to a compounding pharmacy - if that' cheaper

## 2025-01-28 NOTE — TELEPHONE ENCOUNTER
Also see 1/27/2025 [employer] inTouch Message - patient's pharmacy has Mounjaro on back orders and requesting to discuss alternate prescription.  Please call.  Thank you.

## 2025-01-29 RX ORDER — AMLODIPINE BESYLATE 5 MG/1
5 TABLET ORAL DAILY
Qty: 90 TABLET | Refills: 0 | OUTPATIENT
Start: 2025-01-29

## 2025-01-29 RX ORDER — METFORMIN HYDROCHLORIDE 500 MG/1
500 TABLET, EXTENDED RELEASE ORAL 2 TIMES DAILY WITH MEALS
Qty: 180 TABLET | Refills: 0 | Status: SHIPPED | OUTPATIENT
Start: 2025-01-29

## 2025-01-29 RX ORDER — NAPROXEN 500 MG/1
500 TABLET ORAL 2 TIMES DAILY WITH MEALS
Qty: 60 TABLET | Refills: 0 | Status: SHIPPED | OUTPATIENT
Start: 2025-01-29

## 2025-01-29 NOTE — TELEPHONE ENCOUNTER
Refill Passed per Protocol.     Requested Prescriptions   Pending Prescriptions Disp Refills    naproxen 500 MG Oral Tab 60 tablet 0     Sig: Take 1 tablet (500 mg total) by mouth 2 (two) times daily with meals.       Non-Narcotic Pain Medication Protocol Passed - 1/29/2025 11:33 AM        Passed - In person appointment or virtual visit in the past 6 mos or appointment in next 3 mos     Recent Outpatient Visits              6 days ago Essential hypertension    [facility] [place], [place],     Office Visit    1 week ago Encounter for gynecological examination with abnormal finding    [facility] Yasmine Tracy MD    Office Visit    2 weeks ago MCTD (mixed connective tissue disease) (HCC)    [facility]    [facility] Visit    1 month ago Lumbar back pain    [facility] [place]ne Tate DO    Office Visit    2 months ago Severe low back pain    [facility], Virtual Visit Marge Parrish APRN    E-Visit          Future Appointments         Provider Department Appt Notes    In 3 months Sandi Bueno MD [facility] 3 mo f/u                    Passed - Medication is active on med list              Future Appointments         Provider Department Appt Notes    In 3 months Sandi Bueno MD [facility] 3 mo f/u          Recent Outpatient Visits              6 days ago Essential hypertension    [facility] [place] Thompson[place]ne Tate DO    Office Visit    1 week ago Encounter for gynecological examination with abnormal finding    [facility]Yasmine Stephenson MD    Office Visit    2 weeks ago MCTD (mixed connective tissue disease) ([facility])    [facility], Sandi Hull MD    Office Visit    1 month ago Lumbar back pain    [facility], [address], Wayne Beckman DO    Office Visit    2 months ago Severe low back pain    [facility], Virtual Visit Marge Parrish APRN    E-Visit

## 2025-01-29 NOTE — TELEPHONE ENCOUNTER
LOV 10/28/2024  No future apts scheduled     Endo staff: please reach out to patient to help schedule apt.     Thank you!

## 2025-01-29 NOTE — TELEPHONE ENCOUNTER
Dr. Gillis, Spoke to [facility]'s pharmacy regarding the Benadryl/Maalox/Lidocaine 1:1:1 solution. Retail pharmacies no longer do compounding. Because two of the ingredients are available OTC, insurances do not cover it and the cost is about $300. Even if sent to a compounding pharmacy it would not be covered by insurance.    The pharmacist said the best way to order is to order the Lidocaine 2% viscous oral gel, available in 100 mL bottles. The doctor the instructs the patient to purchase and mix the pre-determined quantity of Maalox and Benadryl to the Lidocaine viscous gel.   So to stay with the 1:1:1 it would be 100 mL Maalox and 100 mL Benadryl.    Please advise. Thank you.

## 2025-01-30 RX ORDER — LIDOCAINE HYDROCHLORIDE 20 MG/ML
5 SOLUTION OROPHARYNGEAL
Qty: 100 ML | Refills: 1 | Status: SHIPPED | OUTPATIENT
Start: 2025-01-30

## 2025-01-31 NOTE — TELEPHONE ENCOUNTER
Let pt know I sent in viscous lidocaine 2% - she can mix with maalox 100ml- plus or minus benadryl 1:1:! Mixture   And try 5ml threetimes a day as needed

## 2025-02-04 DIAGNOSIS — M35.1 MCTD (MIXED CONNECTIVE TISSUE DISEASE) (HCC): ICD-10-CM

## 2025-02-04 NOTE — TELEPHONE ENCOUNTER
Requested Prescriptions     Pending Prescriptions Disp Refills    methotrexate 2.5 MG Oral Tab 78 tablet 1     Sig: Take 6 tablets (15 mg total) by mouth once a week.     LOV: 1/15/25  Future Appointments   Date Time Provider Department Center   4/30/2025  9:40 AM Sandi Bueno MD [facility]     Labs:   Component      Latest Ref Rng 1/20/2025   WBC      4.0 - 11.0 x10(3) uL 6.1    RBC      3.80 - 5.30 x10(6)uL 3.57 (L)    Hemoglobin      12.0 - 16.0 g/dL 11.0 (L)    Hematocrit      35.0 - 48.0 % 34.6 (L)    MCV      80.0 - 100.0 fL 96.9    MCH      26.0 - 34.0 pg 30.8    MCHC      31.0 - 37.0 g/dL 31.8    RDW-SD      35.1 - 46.3 fL 43.4    RDW      11.0 - 15.0 % 12.2    Platelet Count      150.0 - 450.0 10(3)uL 282.0    Prelim Neutrophil Abs      1.50 - 7.70 x10 (3) uL 4.35    Neutrophils Absolute      1.50 - 7.70 x10(3) uL 4.35    Lymphocytes Absolute      1.00 - 4.00 x10(3) uL 0.95 (L)    Monocytes Absolute      0.10 - 1.00 x10(3) uL 0.64    Eosinophils Absolute      0.00 - 0.70 x10(3) uL 0.15    Basophils Absolute      0.00 - 0.20 x10(3) uL 0.02    Immature Granulocyte Absolute      0.00 - 1.00 x10(3) uL 0.02    Neutrophils %      % 71.1    Lymphocytes %      % 15.5    Monocytes %      % 10.4    Eosinophils %      % 2.4    Basophils %      % 0.3    Immature Granulocyte %      % 0.3    Glucose      70 - 99 mg/dL 97    Sodium      136 - 145 mmol/L 145    Potassium      3.5 - 5.1 mmol/L 4.2    Chloride      98 - 112 mmol/L 107    Carbon Dioxide, Total      21.0 - 32.0 mmol/L 29.0    ANION GAP      0 - 18 mmol/L 9    BUN      9 - 23 mg/dL 14    CREATININE      0.55 - 1.02 mg/dL 0.82    BUN/CREATININE RATIO      10.0 - 20.0  17.1    CALCIUM      8.7 - 10.4 mg/dL 9.9    CALCULATED OSMOLALITY      275 - 295 mOsm/kg 300 (H)    EGFR      >=60 mL/min/1.73m2 86       Component      Latest Ref Rng 1/8/2025 1/18/2025   WBC      4.0 - 11.0 x10(3) uL 5.0  5.6    RBC      3.80 - 5.30 x10(6)uL 3.75 (L)  3.72 (L)     Hemoglobin      12.0 - 16.0 g/dL 12.2  11.6 (L)    Hematocrit      35.0 - 48.0 % 36.7  35.7    MCV      80.0 - 100.0 fL 97.9  96.0    MCH      26.0 - 34.0 pg 32.5  31.2    MCHC      31.0 - 37.0 g/dL 33.2  32.5    RDW-SD      35.1 - 46.3 fL 43.7  42.9    RDW      11.0 - 15.0 % 12.1  12.1    Platelet Count      150.0 - 450.0 10(3)uL 261.0  362.0    Prelim Neutrophil Abs      1.50 - 7.70 x10 (3) uL 3.09  4.30    Neutrophils Absolute      1.50 - 7.70 x10(3) uL 3.09  4.30    Lymphocytes Absolute      1.00 - 4.00 x10(3) uL 1.14  0.96 (L)    Monocytes Absolute      0.10 - 1.00 x10(3) uL 0.50  0.30    Eosinophils Absolute      0.00 - 0.70 x10(3) uL 0.24  0.00    Basophils Absolute      0.00 - 0.20 x10(3) uL 0.03  0.01    Immature Granulocyte Absolute      0.00 - 1.00 x10(3) uL 0.01  0.01    Neutrophils %      % 61.6  77.0    Lymphocytes %      % 22.8  17.2    Monocytes %      % 10.0  5.4    Eosinophils %      % 4.8  0.0    Basophils %      % 0.6  0.2    Immature Granulocyte %      % 0.2  0.2    Glucose      70 - 99 mg/dL  136 (H)    Sodium      136 - 145 mmol/L  142    Potassium      3.5 - 5.1 mmol/L  4.6    Chloride      98 - 112 mmol/L  108    Carbon Dioxide, Total      21.0 - 32.0 mmol/L  25.0    ANION GAP      0 - 18 mmol/L  9    BUN      9 - 23 mg/dL  14    CREATININE      0.55 - 1.02 mg/dL 0.82  0.87    BUN/CREATININE RATIO      10.0 - 20.0   16.1    CALCIUM      8.7 - 10.4 mg/dL  9.6    CALCULATED OSMOLALITY      275 - 295 mOsm/kg  297 (H)    EGFR      >=60 mL/min/1.73m2 86  80    ALT (SGPT)      10 - 49 U/L 17  24    AST (SGOT)      <34 U/L 28  27    ALKALINE PHOSPHATASE      41 - 108 U/L  73    Total Bilirubin      0.3 - 1.2 mg/dL  0.3    PROTEIN, TOTAL      5.7 - 8.2 g/dL  7.9    Albumin      3.2 - 4.8 g/dL 4.5  4.4    Globulin      2.0 - 3.5 g/dL  3.5    A/G Ratio      1.0 - 2.0   1.3    C-REACTIVE PROTEIN      <1.00 mg/dL 1.50 (H)     SED RATE      0 - 30 mm/Hr 94 (H)        Legend:  (L) Low  (H)  High  Legend:  (L) Low  (H) High  ASSESSMENT/PLAN:      MCTD (+KAREN, SSA, RNP, inflammatory arthritis, calcinosis cutis and 1 episodes of Raynaud's in the past)  - Chest x-ray was normal  - Echocardiogram showing normal EF, grade 1 diastolic dysfunction  - Ultrasound of the hand showed active synovitis  - Having some nodules in her hands, 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  She stopped methotrexate and is now on leflunomide for the past month  -Having more stiffness and pain in her hands and throughout her body since switching to leflunomide  -She would like to switch back to methotrexate.  Advised that the sores in the mouth can come back  -She will restart methotrexate 6 pills weekly and folic acid daily.  She will take folic acid 2 pills a day to help prevent the sores.  I also gave her Magic mouthwash  - Continue hydroxychloroquine 400 mg daily  - She was given information on ophthalmology to evaluate for Plaquenil toxicity  - Blood work  every 3 to 4 months     B/L knee pain 2/2 OA  - X-ray of left knee did show evidence of mild osteoarthritis and joint effusion  - s/p cortisone injection July 2021 and April 2024, 10/2024  - Knee fluid was noninflammatory back in October 2024     R hand carpal tunnel release  - EMG done consistent with moderate carpal tunnel in her right hand  - She has tried prednisone, naproxen and Tylenol with minimal relief  - s/p Right carpal tunnel sheath injection  - She had carpal tunnel release surgery in December 2021     Recent breast biopsy  - This was benign, biopsy showed residual papilloma     Pt will f/u 3-4 mos      There is a longitudinal care relationship with me, the care plan reflects the ongoing nature of the continuous relationship of care, and the medical record indicates that there is ongoing treatment of a serious/complex medical condition which I am currently managing.  is Applicable.      Sandi Bueno MD  1/15/2025  9:21 AM

## 2025-02-05 RX ORDER — METHOTREXATE 2.5 MG/1
15 TABLET ORAL WEEKLY
Qty: 78 TABLET | Refills: 1 | Status: SHIPPED | OUTPATIENT
Start: 2025-02-05

## 2025-02-06 DIAGNOSIS — R92.8 ABNORMAL MAMMOGRAM: Primary | ICD-10-CM

## 2025-02-17 ENCOUNTER — PATIENT MESSAGE (OUTPATIENT)
Dept: RHEUMATOLOGY | Facility: CLINIC | Age: 55
End: 2025-02-17

## 2025-02-19 NOTE — TELEPHONE ENCOUNTER
Called patient regarding her symptoms.  She is having hair loss.  She is on methotrexate.  Advised that methotrexate could be causing this.  She was taking folic acid 2 mg a day.  Plan to stop methotrexate.  She states it was helping her joint pain  For now she will stay on hydroxychloroquine

## 2025-03-04 ENCOUNTER — PATIENT MESSAGE (OUTPATIENT)
Dept: FAMILY MEDICINE CLINIC | Facility: CLINIC | Age: 55
End: 2025-03-04

## 2025-03-04 RX ORDER — VALSARTAN AND HYDROCHLOROTHIAZIDE 160; 12.5 MG/1; MG/1
TABLET, FILM COATED ORAL
COMMUNITY
Start: 2025-02-06

## 2025-03-04 NOTE — TELEPHONE ENCOUNTER
Dr. Clark: patient will be out of medication.     Patient states her blood pressure are within normal range.   Yesterday 125/85; patient states it has been good range.     Patient has seen cardiologist 2/6/25.   (FYI, she was placed on valsartan/hydrochlorothiazide 160/12.5mg.   Patient to continue on amlodipine 5mg. Daily. )      Requested Prescriptions     Pending Prescriptions Disp Refills    amLODIPine 5 MG Oral Tab [Pharmacy Med Name: AMLODIPINE BESYLATE 5MG TABLETS] 90 tablet 0     Sig: TAKE 1 TABLET(5 MG) BY MOUTH DAILY         Recent Visits  Date Type Provider Dept   01/23/25 Office Visit Wayne Clark, DO [facility]   12/03/24 Office Visit Wayne Clark, DO [facility]   10/28/24 Office Visit Wayne Clark, DO [facility]   02/12/24 Office Visit Wayne Clark, DO [facility]   02/02/24 Office Visit Wayne Clark, DO [facility]   12/29/23 Office Visit Wayne Clark, DO [facility]   09/18/23 Office Visit Wayne Clark, DO [facility]   Showing recent visits within past 540 days with a meds authorizing provider and meeting all other requirements  Future Appointments  No visits were found meeting these conditions.  Showing future appointments within next 150 days with a meds authorizing provider and meeting all other requirements

## 2025-03-05 NOTE — TELEPHONE ENCOUNTER
Dr Clark =see below.        COPIED AND PASTE [employer] 3/4/25;  I sent in a request for the Amlodipine last week but have not received. I am out .

## 2025-03-05 NOTE — TELEPHONE ENCOUNTER
See refill encounter 3/2/25.      Future Appointments   Date Time Provider Department Center   3/20/2025  3:30 PM Wayne Clark DO [facility]   3/20/2025  4:00 PM [facility]   4/14/2025 12:30 PM Marylin Harrington APRN [facility]   4/30/2025  9:40 AM [facility]   5/6/2025  7:20 [facility]

## 2025-03-06 RX ORDER — AMLODIPINE BESYLATE 5 MG/1
5 TABLET ORAL DAILY
Qty: 90 TABLET | Refills: 0 | Status: SHIPPED | OUTPATIENT
Start: 2025-03-06

## 2025-04-02 ENCOUNTER — TELEPHONE (OUTPATIENT)
Facility: CLINIC | Age: 55
End: 2025-04-02

## 2025-04-14 ENCOUNTER — OFFICE VISIT (OUTPATIENT)
Dept: ENDOCRINOLOGY CLINIC | Facility: CLINIC | Age: 55
End: 2025-04-14
Payer: COMMERCIAL

## 2025-04-14 ENCOUNTER — OFFICE VISIT (OUTPATIENT)
Dept: FAMILY MEDICINE CLINIC | Facility: CLINIC | Age: 55
End: 2025-04-14
Payer: COMMERCIAL

## 2025-04-14 VITALS
OXYGEN SATURATION: 98 % | HEIGHT: 67 IN | RESPIRATION RATE: 20 BRPM | WEIGHT: 188 LBS | TEMPERATURE: 98 F | HEART RATE: 92 BPM | SYSTOLIC BLOOD PRESSURE: 124 MMHG | BODY MASS INDEX: 29.51 KG/M2 | DIASTOLIC BLOOD PRESSURE: 79 MMHG

## 2025-04-14 VITALS
HEART RATE: 99 BPM | DIASTOLIC BLOOD PRESSURE: 80 MMHG | BODY MASS INDEX: 29.51 KG/M2 | WEIGHT: 188 LBS | SYSTOLIC BLOOD PRESSURE: 118 MMHG | HEIGHT: 67 IN

## 2025-04-14 DIAGNOSIS — E11.9 CONTROLLED TYPE 2 DIABETES MELLITUS WITHOUT COMPLICATION, WITHOUT LONG-TERM CURRENT USE OF INSULIN (HCC): Primary | ICD-10-CM

## 2025-04-14 DIAGNOSIS — I10 ESSENTIAL HYPERTENSION: Primary | ICD-10-CM

## 2025-04-14 DIAGNOSIS — Z79.4 TYPE 2 DIABETES MELLITUS WITHOUT COMPLICATION, WITH LONG-TERM CURRENT USE OF INSULIN (HCC): ICD-10-CM

## 2025-04-14 DIAGNOSIS — E11.9 TYPE 2 DIABETES MELLITUS WITHOUT COMPLICATION, WITH LONG-TERM CURRENT USE OF INSULIN (HCC): ICD-10-CM

## 2025-04-14 LAB
GLUCOSE BLOOD: 115
HEMOGLOBIN A1C: 5.6 % (ref 4.3–5.6)
TEST STRIP LOT #: NORMAL NUMERIC

## 2025-04-14 PROCEDURE — 3074F SYST BP LT 130 MM HG: CPT | Performed by: FAMILY MEDICINE

## 2025-04-14 PROCEDURE — 83036 HEMOGLOBIN GLYCOSYLATED A1C: CPT

## 2025-04-14 PROCEDURE — 3074F SYST BP LT 130 MM HG: CPT

## 2025-04-14 PROCEDURE — 3008F BODY MASS INDEX DOCD: CPT

## 2025-04-14 PROCEDURE — 82947 ASSAY GLUCOSE BLOOD QUANT: CPT

## 2025-04-14 PROCEDURE — 99214 OFFICE O/P EST MOD 30 MIN: CPT | Performed by: FAMILY MEDICINE

## 2025-04-14 PROCEDURE — 3079F DIAST BP 80-89 MM HG: CPT

## 2025-04-14 PROCEDURE — 3008F BODY MASS INDEX DOCD: CPT | Performed by: FAMILY MEDICINE

## 2025-04-14 PROCEDURE — 99214 OFFICE O/P EST MOD 30 MIN: CPT

## 2025-04-14 PROCEDURE — 3044F HG A1C LEVEL LT 7.0%: CPT | Performed by: FAMILY MEDICINE

## 2025-04-14 PROCEDURE — 3078F DIAST BP <80 MM HG: CPT | Performed by: FAMILY MEDICINE

## 2025-04-14 RX ORDER — AMLODIPINE BESYLATE 5 MG/1
5 TABLET ORAL DAILY
Qty: 30 TABLET | Refills: 11 | Status: SHIPPED | OUTPATIENT
Start: 2025-04-14

## 2025-04-14 RX ORDER — VALSARTAN AND HYDROCHLOROTHIAZIDE 160; 12.5 MG/1; MG/1
1 TABLET, FILM COATED ORAL DAILY
Qty: 30 TABLET | Refills: 11 | Status: SHIPPED | OUTPATIENT
Start: 2025-04-14

## 2025-04-14 NOTE — PROGRESS NOTES
----- Message from GINETTE Aguirre sent at 3/6/2022  2:47 AM CST -----  She may need to contact home care company if her catheter issues are related to equipment. If she is having signs of UTI she needs to go to the ER as we do not manage complicated UTIs and the catheter would fit in this category.    Name: Paula Brooks  Date: 4/14/25    Referring Physician: No ref. provider found    HISTORY OF PRESENT ILLNESS   Paula Brooks is a 54 year old female who presents for follow up  for diabetes mellitus.     She was admitted to the hospital 5/10/2022 after presenting to the ED with polyuria, polydipsia and generalized weakness. She was found to have significant hyperglycemia. Of note she had been taking prednisone for chronic idiopathic urticaria, but is now off prednisone after transitioning to Xolair.     Underwent workup with rheumatology for joint pain- was diagnosed with mixed connective tissue disease. Was on prednisone intermittently but infrequently using.     Diabetes History:  Diagnosed- 5/2022    FH DM  -grandfather possibly    Prior glycohemoglobin were 13.2% 5/2022; 9.5% 6/2022; 6.4% 8/2022; 5.8% 12/2022; 5.6% 3/2023; 6.2% 8/2023; 5.9% 12/2023; 5.9% 4/2024; 5.7% 10/2024; 5.6% POC today     Dietary compliance: Good     Recall:  Breakfast - yogurt with strawberries, raspberries and blueberries, juicing  Lunch - protein wraps with turkey, or sometimes just fruit at work during evening shift   Dinner-protein and some vegetables - more vegetables recently   Snack-nuts and sugar free jello , fruits   Beverages- water, ginger ale occasionally     Exercise: Yes- has been walking daily, active at work     Polyuria/polydipsia: No  Blurred vision: No     Episodes of hypoglycemia: None  Blood Glucose:    Checking 1-2 times per day- Reviewed logs:    Averaging- 's     Previous DM medications  70/30 mixed insulin- stopped after weaned from oral steroids  Metformin- GI side effects and fatigue     Ozempic- significant GI side effects   Trulicity- stopped when transitioned to Ozempic     Current DM Regimen:  Mounjaro 5mg subcutaneous weekly     Modifying factors:  Medication adherence: yes   Recent steroids, illness or infections: No        REVIEW OF SYSTEMS  Eyes: Diabetic retinopathy present: No              Most recent visit to eye doctor in last 12 months: Yes- 3/2025     CV: Cardiovascular disease present: No         Hypertension present: Yes         Hyperlipidemia present: Yes         Peripheral Vascular Disease present: No    : Nephropathy present: No    Neuro: Neuropathy present: No    Skin: Infection or ulceration: No    Osteoporosis: No    Thyroid disease: No      Medications:     Current Outpatient Medications:     AMLODIPINE 5 MG Oral Tab, TAKE 1 TABLET(5 MG) BY MOUTH DAILY, Disp: 90 tablet, Rfl: 0    valsartan-hydroCHLOROthiazide 160-12.5 MG Oral Tab, valsartan 160 mg-hydrochlorothiazide 12.5 mg tablet, [RxNorm: 396020], Disp: , Rfl:     methotrexate 2.5 MG Oral Tab, Take 6 tablets (15 mg total) by mouth once a week., Disp: 78 tablet, Rfl: 1    Lidocaine Viscous HCl 2 % Mouth/Throat Solution, Take 5 mL by mouth every 3 (three) hours as needed for Pain. Mix with 100ml otc maalox and benadryl 1:1:1 solution, Disp: 100 mL, Rfl: 1    naproxen 500 MG Oral Tab, Take 1 tablet (500 mg total) by mouth 2 (two) times daily with meals., Disp: 60 tablet, Rfl: 0    METFORMIN  MG Oral Tablet 24 Hr, TAKE 1 TABLET(500 MG) BY MOUTH TWICE DAILY WITH MEALS, Disp: 180 tablet, Rfl: 0    Tirzepatide (MOUNJARO) 5 MG/0.5ML Subcutaneous Solution Auto-injector, ADMINISTER 5 MG UNDER THE SKIN 1 TIME A WEEK, Disp: 6 mL, Rfl: 1    Benadryl/Maalox/Lidocaine 1:1:1 solution, Take 5-10 mL by mouth TID AC&HS. Swish and Smallow or Swish and Spit, Disp: 450 mL, Rfl: 1    rosuvastatin 10 MG Oral Tab, Take 1 tablet (10 mg total) by mouth nightly. (Patient not taking: Reported on 4/14/2025), Disp: 90 tablet, Rfl: 1    pantoprazole 40 MG Oral Tab EC, Take 1 tablet (40 mg total) by mouth daily as needed., Disp: 30 tablet, Rfl: 5    zolpidem 10 MG Oral Tab, Take 1 tablet (10 mg total) by mouth nightly as needed., Disp: 30 tablet, Rfl: 5    folic acid 1 MG Oral Tab, Take 1 tablet (1 mg total) by mouth daily., Disp: 90 tablet, Rfl: 2     albuterol (PROAIR HFA) 108 (90 Base) MCG/ACT Inhalation Aero Soln, Inhale 2 puffs into the lungs every 6 (six) hours as needed for Wheezing., Disp: 1 each, Rfl: 1    Glucose Blood (ONETOUCH VERIO) In Vitro Strip, CHECK SUGARS TWICE DAILY (Patient not taking: Reported on 4/14/2025), Disp: 200 strip, Rfl: 0    EPINEPHrine (EPIPEN 2-YANY) 0.3 MG/0.3ML Injection Solution Auto-injector, Inject IM in event of  allergic reaction (Patient not taking: Reported on 4/14/2025), Disp: 1 each, Rfl: 0    Blood Glucose Monitoring Suppl (ONETOUCH VERIO FLEX SYSTEM) w/Device Does not apply Kit, 1 each As Directed. To check sugars twice a day E 11.65 with insulin use, Disp: 1 kit, Rfl: 0    OneTouch Delica Lancets 33G Does not apply Misc, To check sugars twice a day E 11.65 with insulin use, Disp: 200 each, Rfl: 0    Blood Glucose Monitoring Suppl Does not apply Kit, Use as directed to check blood glucose twice a day - fasting and before dinner, Disp: 1 kit, Rfl: 0    Blood Glucose Monitoring Suppl Does not apply Device, Use as directed to check blood glucose twice a day -fasting and before dinner, Disp: 200 each, Rfl: 0    Microlet Lancets Does not apply Misc, Use as directed to check blood glucose twice a day - fasting and before dinner, Disp: 200 each, Rfl: 0     Allergies:   Allergies   Allergen Reactions    Iodine (Topical) ANAPHYLAXIS, HIVES and UNKNOWN     Eyes swell, welts on body    Shellfish HIVES       Social History:   Social History     Socioeconomic History    Marital status: Single   Tobacco Use    Smoking status: Never    Smokeless tobacco: Never   Vaping Use    Vaping status: Never Used   Substance and Sexual Activity    Alcohol use: Yes     Comment: occasionally    Drug use: No    Sexual activity: Yes     Partners: Male     Birth control/protection: Implant     Comment: ESSURE   Other Topics Concern    Caffeine Concern Yes     Comment: coffee, 1/2 cup daily    Right Handed Yes       Medical History:   Past Medical  History:    Allergy    allergies    Amenorrhea    Asthma (HCC)    Decorative tattoo    Diabetes (HCC)    Essential hypertension    High blood pressure    High cholesterol    Hypercholesteremia    Hyperlipidemia    Unspecified disorder of neck    overactive gland in neck, surgical removal 1991       Surgical history:   Past Surgical History:   Procedure Laterality Date    Hysteroscopy, sterilization  2017    Essure    Incisional biopsy skin single lesion Left 09/23/2020    left shoulder lipoma    Sara biopsy stereo nodule 1 site left (cpt=19081)  12/08/2023    for focal asymetry; X clip    Sara localization wire 1 site left (cpt=19281) Left 02/22/2024    vision clip    Neck/chest procedure unlisted  1991    surgical removal (overactive gland in neck)    Needle biopsy left Left 12/20/2023    US [facility]    Wrist arthroscop,release xvers lig Right 12/10/2021    Right endoscopic carpal tunnel release         PHYSICAL EXAM  Vitals:    04/14/25 1218   BP: 118/80   Pulse: 99   Weight: 188 lb (85.3 kg)   Height: 5' 7\" (1.702 m)           General Appearance:  alert, well developed, in no acute distress  Eyes:  normal conjunctivae, sclera, and normal pupils  Neck: Trachea midline: Normal  Back: no kyphosis or back tenderness  Musculoskeletal:  normal muscle strength and tone  Skin:  normal moisture and skin texture  Hair & Nails:  normal scalp hair     Hematologic:  no excessive bruising  Neuro:  sensory grossly intact and motor grossly intact.  Psychiatric:  oriented to time, self, and place  Nutritional:  no abnormal weight gain or loss      ASSESSMENT/PLAN:    Diabetes mellitus type 2 Controlled   - HgA1c - 5.6% POC today   -Congratulated patient on improved glycemic control   - Reviewed ABC's of diabetes  - Reviewed pathogenesis of diabetes.   -Congratulated patient on improved glycemic control.   - Reviewed importance of good glycemic control to prevent microvascular and macrovascular complications including nephropathy,  neuropathy, retinopathy, and cardiovascular disease.  - Reviewed importance of SBGM- check glucose 2 times daily   - Reviewed target glucose goals for patient 80-120mg/dl fasting and <180 mg/dl post prandially   - Reviewed importance of following diabetic diet- recommended 135 grams of  CHO per day or 45 grams per meal.   - Provided patient education materials  -SBGM logs show glucose levels at goal at home.     - Continue Mounjaro 5 mg subcutaneous weekly. Reviewed side effects and risks vs benefits of medication.     - Lipids normal 11/2024- LDL-103, on rosuvastatin   - no nephropathy  - UTD with optho - ast visit 3/2025  - normal foot exam today     Hypertension  -normotensive on valsartan 160mg PO daily and amlodipine       RTC in 6 months   4/14/25  ANNAMARIE Michael      A total of 30 minutes was spent on obtaining history, reviewing pertinent imaging/labs and specialists notes, evaluating patient, providing multiple treatment options, reinforcing diet/exercise and compliance, and completing documentation.

## 2025-04-14 NOTE — PROGRESS NOTES
Subjective:   Paula Brooks is a 54 year old female who presents for Blood Pressure       History/Other:    Chief Complaint Reviewed and Verified  No Further Nursing Notes to   Review  Allergies Reviewed  Medications Reviewed  OB Status Reviewed         Tobacco:  She has never smoked tobacco.    Current Medications[1]      Review of Systems:  Review of Systems   Constitutional: Negative.    Respiratory: Negative.     Cardiovascular: Negative.    Neurological: Negative.          Objective:   /79   Pulse 92   Temp 97.9 °F (36.6 °C) (Temporal)   Resp 20   Ht 5' 7\" (1.702 m)   Wt 188 lb (85.3 kg)   LMP 11/15/2021 (Approximate)   SpO2 98%   BMI 29.44 kg/m²  Estimated body mass index is 29.44 kg/m² as calculated from the following:    Height as of this encounter: 5' 7\" (1.702 m).    Weight as of this encounter: 188 lb (85.3 kg).  Physical Exam  Vitals reviewed.   Cardiovascular:      Rate and Rhythm: Normal rate and regular rhythm.      Pulses: Normal pulses.      Heart sounds: Normal heart sounds.   Pulmonary:      Effort: Pulmonary effort is normal.      Breath sounds: Normal breath sounds.   Musculoskeletal:      Right lower leg: No edema.      Left lower leg: No edema.           Assessment & Plan:   1. Essential hypertension (Primary)  Well controlled on current dose,  she has home monitor which runs high when taken today    2. Type 2 diabetes mellitus without complication, with long-term current use of insulin (HCC)  Other orders  -     Valsartan-hydroCHLOROthiazide; Take 1 tablet by mouth daily.  Dispense: 30 tablet; Refill: 11  -     amLODIPine Besylate; Take 1 tablet (5 mg total) by mouth daily.  Dispense: 30 tablet; Refill: 11  Sees endo and well controlled.       Return in about 6 months (around 10/14/2025).    Wayne Clark DO, 4/14/2025, 5:51 PM        [1]   Current Outpatient Medications   Medication Sig Dispense Refill    valsartan-hydroCHLOROthiazide 160-12.5 MG Oral Tab Take 1  tablet by mouth daily. 30 tablet 11    amLODIPine 5 MG Oral Tab Take 1 tablet (5 mg total) by mouth daily. 30 tablet 11    naproxen 500 MG Oral Tab Take 1 tablet (500 mg total) by mouth 2 (two) times daily with meals. 60 tablet 0    rosuvastatin 10 MG Oral Tab Take 1 tablet (10 mg total) by mouth nightly. 90 tablet 1    pantoprazole 40 MG Oral Tab EC Take 1 tablet (40 mg total) by mouth daily as needed. 30 tablet 5    zolpidem 10 MG Oral Tab Take 1 tablet (10 mg total) by mouth nightly as needed. 30 tablet 5    albuterol (PROAIR HFA) 108 (90 Base) MCG/ACT Inhalation Aero Soln Inhale 2 puffs into the lungs every 6 (six) hours as needed for Wheezing. 1 each 1    EPINEPHrine (EPIPEN 2-YANY) 0.3 MG/0.3ML Injection Solution Auto-injector Inject IM in event of  allergic reaction 1 each 0    Blood Glucose Monitoring Suppl (ONETOUCH VERIO FLEX SYSTEM) w/Device Does not apply Kit 1 each As Directed. To check sugars twice a day E 11.65 with insulin use 1 kit 0    OneTouch Delica Lancets 33G Does not apply Misc To check sugars twice a day E 11.65 with insulin use 200 each 0    Blood Glucose Monitoring Suppl Does not apply Kit Use as directed to check blood glucose twice a day - fasting and before dinner 1 kit 0    Blood Glucose Monitoring Suppl Does not apply Device Use as directed to check blood glucose twice a day -fasting and before dinner 200 each 0    Microlet Lancets Does not apply Misc Use as directed to check blood glucose twice a day - fasting and before dinner 200 each 0    methotrexate 2.5 MG Oral Tab Take 6 tablets (15 mg total) by mouth once a week. (Patient not taking: Reported on 4/14/2025) 78 tablet 1    Tirzepatide (MOUNJARO) 5 MG/0.5ML Subcutaneous Solution Auto-injector ADMINISTER 5 MG UNDER THE SKIN 1 TIME A WEEK (Patient not taking: Reported on 4/14/2025) 6 mL 1    folic acid 1 MG Oral Tab Take 1 tablet (1 mg total) by mouth daily. (Patient not taking: Reported on 4/14/2025) 90 tablet 2    Glucose Blood  (ONETOUCH VERIO) In Vitro Strip CHECK SUGARS TWICE DAILY (Patient not taking: Reported on 4/14/2025) 200 strip 0

## 2025-04-25 ENCOUNTER — LAB ENCOUNTER (OUTPATIENT)
Dept: LAB | Age: 55
End: 2025-04-25
Attending: INTERNAL MEDICINE
Payer: COMMERCIAL

## 2025-04-25 DIAGNOSIS — Z51.81 MEDICATION MONITORING ENCOUNTER: ICD-10-CM

## 2025-04-25 DIAGNOSIS — M35.1 MCTD (MIXED CONNECTIVE TISSUE DISEASE) (HCC): ICD-10-CM

## 2025-04-25 DIAGNOSIS — G89.29 CHRONIC PAIN OF BOTH KNEES: ICD-10-CM

## 2025-04-25 DIAGNOSIS — M25.562 CHRONIC PAIN OF BOTH KNEES: ICD-10-CM

## 2025-04-25 DIAGNOSIS — M25.561 CHRONIC PAIN OF BOTH KNEES: ICD-10-CM

## 2025-04-25 DIAGNOSIS — M79.642 BILATERAL HAND PAIN: ICD-10-CM

## 2025-04-25 DIAGNOSIS — M79.641 BILATERAL HAND PAIN: ICD-10-CM

## 2025-04-25 LAB
ALBUMIN SERPL-MCNC: 4.4 G/DL (ref 3.2–4.8)
ALT SERPL-CCNC: 14 U/L (ref 10–49)
AST SERPL-CCNC: 26 U/L (ref ?–34)
BASOPHILS # BLD AUTO: 0.01 X10(3) UL (ref 0–0.2)
BASOPHILS NFR BLD AUTO: 0.2 %
CREAT BLD-MCNC: 0.82 MG/DL (ref 0.55–1.02)
CRP SERPL-MCNC: <0.4 MG/DL (ref ?–1)
DEPRECATED RDW RBC AUTO: 44.8 FL (ref 35.1–46.3)
EGFRCR SERPLBLD CKD-EPI 2021: 86 ML/MIN/1.73M2 (ref 60–?)
EOSINOPHIL # BLD AUTO: 0.07 X10(3) UL (ref 0–0.7)
EOSINOPHIL NFR BLD AUTO: 1.6 %
ERYTHROCYTE [DISTWIDTH] IN BLOOD BY AUTOMATED COUNT: 12.6 % (ref 11–15)
ERYTHROCYTE [SEDIMENTATION RATE] IN BLOOD: 43 MM/HR (ref 0–30)
HCT VFR BLD AUTO: 34.7 % (ref 35–48)
HGB BLD-MCNC: 11.2 G/DL (ref 12–16)
IMM GRANULOCYTES # BLD AUTO: 0.01 X10(3) UL (ref 0–1)
IMM GRANULOCYTES NFR BLD: 0.2 %
LYMPHOCYTES # BLD AUTO: 0.86 X10(3) UL (ref 1–4)
LYMPHOCYTES NFR BLD AUTO: 19.9 %
MCH RBC QN AUTO: 31.3 PG (ref 26–34)
MCHC RBC AUTO-ENTMCNC: 32.3 G/DL (ref 31–37)
MCV RBC AUTO: 96.9 FL (ref 80–100)
MONOCYTES # BLD AUTO: 0.34 X10(3) UL (ref 0.1–1)
MONOCYTES NFR BLD AUTO: 7.9 %
NEUTROPHILS # BLD AUTO: 3.04 X10 (3) UL (ref 1.5–7.7)
NEUTROPHILS # BLD AUTO: 3.04 X10(3) UL (ref 1.5–7.7)
NEUTROPHILS NFR BLD AUTO: 70.2 %
PLATELET # BLD AUTO: 196 10(3)UL (ref 150–450)
RBC # BLD AUTO: 3.58 X10(6)UL (ref 3.8–5.3)
WBC # BLD AUTO: 4.3 X10(3) UL (ref 4–11)

## 2025-04-25 PROCEDURE — 84460 ALANINE AMINO (ALT) (SGPT): CPT

## 2025-04-25 PROCEDURE — 84450 TRANSFERASE (AST) (SGOT): CPT

## 2025-04-25 PROCEDURE — 85025 COMPLETE CBC W/AUTO DIFF WBC: CPT

## 2025-04-25 PROCEDURE — 36415 COLL VENOUS BLD VENIPUNCTURE: CPT

## 2025-04-25 PROCEDURE — 86140 C-REACTIVE PROTEIN: CPT

## 2025-04-25 PROCEDURE — 82040 ASSAY OF SERUM ALBUMIN: CPT

## 2025-04-25 PROCEDURE — 82565 ASSAY OF CREATININE: CPT

## 2025-04-25 PROCEDURE — 85652 RBC SED RATE AUTOMATED: CPT

## 2025-04-30 ENCOUNTER — OFFICE VISIT (OUTPATIENT)
Age: 55
End: 2025-04-30
Payer: COMMERCIAL

## 2025-04-30 VITALS
BODY MASS INDEX: 29.19 KG/M2 | SYSTOLIC BLOOD PRESSURE: 128 MMHG | WEIGHT: 186 LBS | HEART RATE: 88 BPM | DIASTOLIC BLOOD PRESSURE: 84 MMHG | HEIGHT: 67 IN

## 2025-04-30 DIAGNOSIS — G89.29 CHRONIC PAIN OF BOTH KNEES: ICD-10-CM

## 2025-04-30 DIAGNOSIS — M35.1 MCTD (MIXED CONNECTIVE TISSUE DISEASE) (HCC): Primary | ICD-10-CM

## 2025-04-30 DIAGNOSIS — Z51.81 MEDICATION MONITORING ENCOUNTER: ICD-10-CM

## 2025-04-30 DIAGNOSIS — M25.561 CHRONIC PAIN OF BOTH KNEES: ICD-10-CM

## 2025-04-30 DIAGNOSIS — M25.562 CHRONIC PAIN OF BOTH KNEES: ICD-10-CM

## 2025-04-30 PROCEDURE — 99214 OFFICE O/P EST MOD 30 MIN: CPT | Performed by: INTERNAL MEDICINE

## 2025-04-30 PROCEDURE — 3008F BODY MASS INDEX DOCD: CPT | Performed by: INTERNAL MEDICINE

## 2025-04-30 PROCEDURE — 3074F SYST BP LT 130 MM HG: CPT | Performed by: INTERNAL MEDICINE

## 2025-04-30 PROCEDURE — 20610 DRAIN/INJ JOINT/BURSA W/O US: CPT | Performed by: INTERNAL MEDICINE

## 2025-04-30 PROCEDURE — 3079F DIAST BP 80-89 MM HG: CPT | Performed by: INTERNAL MEDICINE

## 2025-04-30 RX ORDER — TRIAMCINOLONE ACETONIDE 40 MG/ML
40 INJECTION, SUSPENSION INTRA-ARTICULAR; INTRAMUSCULAR ONCE
Status: COMPLETED | OUTPATIENT
Start: 2025-04-30 | End: 2025-04-30

## 2025-04-30 RX ORDER — NAPROXEN 500 MG/1
500 TABLET ORAL 2 TIMES DAILY WITH MEALS
Qty: 60 TABLET | Refills: 0 | Status: SHIPPED | OUTPATIENT
Start: 2025-04-30

## 2025-04-30 NOTE — PATIENT INSTRUCTIONS
You were seen today for mixed connective tissue disease  Joints overall are better  Continue hydroxychloroquine  Continue Tylenol and naproxen as needed  We could always add azathioprine if you continue to have a lot of pain and stiffness  I injected both knees with cortisone  Make a follow-up in 3 to 4 months  Make sure to get blood work again in 3 to 4-month

## 2025-04-30 NOTE — PROCEDURES
With paitent's consent, I injected pt's B/L knee with 1ml lidocaine 1 % and 1 ml kenalog 40. It was done under sterile technique using iodine and alcohol swabs and ethyl chloride was used as an anaesthetic spray. Pt.  tolerated it well.  Left knee aspirated approximate 10 cc of synovial fluid was removed

## 2025-04-30 NOTE — PROGRESS NOTES
Paula Brooks is a 54 year old female.    HPI:     Chief Complaint   Patient presents with    Mixed Connective Tissue Disease       I had the pleasure of seeing Paula Brooks on 4/30/2025 for follow up for MCTD (+KAREN, RNP, SSA, inflammatory arthritis)    Current Medications:  Tylenol arthritis or aleve as needed    mg daily- started 7/2024  Naproxen 500 mg BID  Prednisone as needed   Previous medications:  Methotrexate 6 pills weekly- started 3/2024-12/2024, had significant oral ulcers, hair loss  Leflunomide 20 mg daily- started 12/2024, had s/e and did not help  Blood work:  +KAREN 1:5120 speckled pattern, +SSA, RNP  Neg RF, CCP, CRP  ESR 30  XR L knee: minimal OA, joint effusion   US hands: Synovial thickening involving the MCP and IP joints of both hands, mild hyperemia involving the right first MCP joint consistent with active synovitis, no erosions  CXR 5/2024: normal      Interval History:  This is a 50 yo F with no medical conditions presents with left knee pain.  She reports left knee pain starting a couple of weeks ago.  She has been walking 3 miles every other day and is noticed some increased knee pain and stiffness.  She also reports intermittent swelling in the suprapatellar region.  She states that the knee is not very painful but stiff and uncomfortable.  Denies any other joint pain or swelling.  Reports stiffness throughout her body in the morning but loosens up in a couple of minutes.  Has been taking Tylenol and Aleve with minimal relief in her knee.  Denies any history of rash, psoriasis, uveitis, dactylitis, enthesitis, GI or  symptoms.    9/8/2021:  She was seen back in July for left knee pain, she received a cortisone injection her symptoms have improved  She reports less stiffness in her knee, no swelling.  She did not do physical therapy  She reports having worsening right wrist pain with numbness and tingling in her hands.  Both hands are affected but right hand is worse than the  left  Symptoms started about 1 week ago  Wrist feels slightly swollen  She wakes up with numbness and tingling in her right hand  She works at a [employer] and is on the computer for most the day  She tried Aleve, Motrin and Tylenol with minimal relief  Denies any history of psoriasis, Crohn's or also colitis or lower back pain    10/8/2021:  She presents for follow-up of her right hand numbness and tingling and pain  EMG was done showing findings consistent with moderate carpal tunnel symptoms  She states her symptoms are more persistent now, has chronic numbness and tingling in her right hand  She has tried prednisone, naproxen and Tylenol with minimal relief  Continues to wear the wrist splints at night  Has noticed some numbness in her right fourth and fifth fingers but very mild    11/12/2021:   She been following with right hand pain due to carpal tunnel symptoms.  She will be having carpal tunnel surgery on December 10  Now having numbness and tingling in her left hand mostly in the fingers  Worse in the morning. Tingling is non-stop  EMG done of R hand but not the left     2/28/2022:   Presents for follow-up of left knee pain secondary to OA  Reports some swelling in her left knee and difficulty ambulating.  She had a cortisone injection back in July 2021 and it helped.  X-ray of the left knee showed minimal OA  She had right carpal tunnel release back in December 2021 and it helped significantly.  Left wrist is doing well too.  No numbness or tingling in the left wrist.  She is also following with allergist for chronic hives.  Sh is on antihistamines and prednisone as needed.  They are try to get her approved for Xolair monthly    3/5/2024:  Presents with worsening joint pain, last seen Feb 2022  Having worsening pain in the hands for the last 1-2 mos  Having swelling across the MCPs, hard to make full fist with both hands  Pain is worse in the morning   Had Right hand carpal tunnel release, it was  good for a year but then started to have pain again  No numbness or tingling in the fingers  Having pain in both knees now, no swelling in the knees  Having stiffness in the wrists and shoulders  No rash, lower back pain  Had a left breast biopsy but was benign     4/23/2024:  Presents for f/u of joint pain, mostly in the hands and the knees  When waking up in the morning hands are stiff  Had surgery in the right hand for carpal tunnel 2021, no n/t now but stiffness  Has swelling in the fingers  When put on prednisone it does help, when on prednisone 5 mg symptoms were stable but still has some pain and swelling  Has pin in both knees, left is worse than right    7/8/2024:  Presents for f/u of joint pain, found to have MCTD (+KAREN, RNP, SSA, inflammatory arthritis, one episode of RP)  She is now on methotrexate 6 pills weekly and FA   Overall the body pains have improved  Still having b/l hand pain, right worse than left   Able to make a fist  Knees are stable, both knees were injected with cortisone which helped   Would also get intermittent rashes but improved on methotrexate, she was on xolar in the past but not on it anymore  Mild dry eyes, no dry mouth    10/23/2024:  Presents for f/u of joint pain, found to have MCTD (+KAREN, RNP, SSA, inflammatory arthritis, one episode of RP)  She is now on methotrexate 6 pills weekly, FA and  mg daily  Has less pain and swelling in the hands  Has some stiffness in the morning   Also some numbness and tingling in left 4th and 5th fingers  Still has dry eyes  Has a rash on left elbow and under right breast, no papules   Had a recent echocardiogram   Having some nodules in the hands, seems like calcinosis cutis     1/15/2025:  Presents for f/u of joint pain, found to have MCTD (+KAERN, RNP, SSA, inflammatory arthritis, one episode of RP)  She was on methotrexate but had significant mouth sores   Now on leflunomide for the past month  Now having more stiffness in the hands,  joints felt better on methotrexate  During her last visit both knees were injected with cortisone, it did helped  No rashes,   Having some pain in right lower back and flank region   CT a/p last month was essentially negative     4/30/2025:  Presents for f/u of joint pain, found to have MCTD (+KAREN, RNP, SSA, inflammatory arthritis, one episode of RP)  She was on methotrexate but had significant mouth sores and hair loss  Leflunomide did not help  No sob  Continues to have GERD and on pantoprazole  No rashes  Stiffness in has improved, calcinosis has also improved   Continues to  have b/l knee pain and some swelling in the left knee   Last knee injection was Oct 2024        HISTORY:  Past Medical History:    Allergy    allergies    Amenorrhea    Asthma (HCC)    Decorative tattoo    Diabetes (HCC)    Essential hypertension    High blood pressure    High cholesterol    Hypercholesteremia    Hyperlipidemia    Unspecified disorder of neck    overactive gland in neck, surgical removal 1991      Social Hx Reviewed   Family Hx Reviewed     Medications (Active prior to today's visit):  Current Outpatient Medications   Medication Sig Dispense Refill    valsartan-hydroCHLOROthiazide 160-12.5 MG Oral Tab Take 1 tablet by mouth daily. 30 tablet 11    amLODIPine 5 MG Oral Tab Take 1 tablet (5 mg total) by mouth daily. 30 tablet 11    methotrexate 2.5 MG Oral Tab Take 6 tablets (15 mg total) by mouth once a week. (Patient not taking: Reported on 4/30/2025) 78 tablet 1    naproxen 500 MG Oral Tab Take 1 tablet (500 mg total) by mouth 2 (two) times daily with meals. 60 tablet 0    Tirzepatide (MOUNJARO) 5 MG/0.5ML Subcutaneous Solution Auto-injector ADMINISTER 5 MG UNDER THE SKIN 1 TIME A WEEK (Patient not taking: Reported on 4/30/2025) 6 mL 1    rosuvastatin 10 MG Oral Tab Take 1 tablet (10 mg total) by mouth nightly. 90 tablet 1    pantoprazole 40 MG Oral Tab EC Take 1 tablet (40 mg total) by mouth daily as needed. 30 tablet 5     zolpidem 10 MG Oral Tab Take 1 tablet (10 mg total) by mouth nightly as needed. 30 tablet 5    folic acid 1 MG Oral Tab Take 1 tablet (1 mg total) by mouth daily. (Patient not taking: Reported on 4/30/2025) 90 tablet 2    albuterol (PROAIR HFA) 108 (90 Base) MCG/ACT Inhalation Aero Soln Inhale 2 puffs into the lungs every 6 (six) hours as needed for Wheezing. 1 each 1    Glucose Blood (ONETOUCH VERIO) In Vitro Strip CHECK SUGARS TWICE DAILY (Patient not taking: Reported on 4/30/2025) 200 strip 0    EPINEPHrine (EPIPEN 2-YANY) 0.3 MG/0.3ML Injection Solution Auto-injector Inject IM in event of  allergic reaction 1 each 0    Blood Glucose Monitoring Suppl (ONETOUCH VERIO FLEX SYSTEM) w/Device Does not apply Kit 1 each As Directed. To check sugars twice a day E 11.65 with insulin use 1 kit 0    OneTouch Delica Lancets 33G Does not apply Misc To check sugars twice a day E 11.65 with insulin use 200 each 0    Blood Glucose Monitoring Suppl Does not apply Kit Use as directed to check blood glucose twice a day - fasting and before dinner 1 kit 0    Blood Glucose Monitoring Suppl Does not apply Device Use as directed to check blood glucose twice a day -fasting and before dinner 200 each 0    Microlet Lancets Does not apply Misc Use as directed to check blood glucose twice a day - fasting and before dinner 200 each 0     .cmed  Allergies:  Allergies   Allergen Reactions    Iodine (Topical) ANAPHYLAXIS, HIVES and UNKNOWN     Eyes swell, welts on body    Shellfish HIVES         ROS:   All other ROS are negative.     PHYSICAL EXAM:   GEN: AAOx3, NAD  HEENT: EOMI, PERRLA, no injection or icterus, oral mucosa moist, no oral lesions. No lymphadenopathy. No facial rash  CVS: RRR, no murmurs rubs or gallops. Equal 2+ distal pulses.   LUNGS: CTAB, no increased work of breathing  ABDOMEN:  soft NT/ND, +BS, no HSM  SKIN: No rashes or skin lesions. No nail findings  MSK:  Cervical spine: FROM  Hands: Swelling noted diffusely in her  fingers, able to make a full fist but not strong  Wrist: FROM, no pain or swelling or warmth on palpation  Elbow: FROM, no pain or swelling or warmth on palpation  Shoulders: FROM, no pain or swelling or warmth on palpation  Hip: normal log roll, no lateral hip pain, CHRIS test negative b/l  Knees: L knee no swelling, nonTTP   Ankles: FROM, no pain or swelling or warmth on palpation  Feet: no pain with MTP squeeze, no toe swelling or pain or warmth on palpation with FROM  Spine: no lumbar or sacral pain on palpation.  NEURO: Cranial nerves II-XII intact grossly. 5/5 strength throughout in both upper and lower extremities, sensation intact.  PSYCH: normal mood       LABS:     Component      Latest Ref Rng & Units 6/17/2020   C-REACTIVE PROTEIN      <0.30 mg/dL 0.34 (H)   C-Citrullinated Peptide IgG AB      0.0 - 6.9 U/mL 1.5   SED RATE      0 - 20 mm/Hr 35 (H)   RHEUMATOID FACTOR      <15 IU/mL <10     Component      Latest Ref Rng 4/23/2024   Anti-SSA Antibody, IGG      <7 U/mL 9.2 (H)    Anti-SSB Antibody, IGG      <7 U/mL 5.0    Anti-Smith Antibody, IGG      <7 U/mL 3.5    Anti-U1RNP Antibody, IGG      <5 U/mL 221.0 (H)    Anti-RNP70 Antibody, IGG      <7 U/mL >218.0 (H)    Anti-Centromere Antibody, IGG      <7 U/mL 1.1    Anti-SCL70 Antibody, IGG      <7 U/mL 2.1    Anti-Alyx-1 Antibody, IGG      <7 U/mL 0.7    Quantiferon TB NIL      IU/mL 0.03    Quantiferon-TB1 Minus NIL      IU/mL 0.01    Quantiferon-TB2 Minus NIL      IU/mL 0.02    Quantiferon TB Mitogen minus NIL      IU/mL >10.00    Quantiferon TB Result      Negative  Negative    KAREN Titer/Pattern      <80  5120 !         Imaging:     US hands 7/2024:  1. Synovial thickening involving the MCP and IP joints of both hands.  There is mild hyperemia involving the right 1st MCP joint consistent with active synovitis.  No periarticular erosions.   2. Bifid right median nerve is otherwise unremarkable.     XR L knee:  CONCLUSION:   1. Minimal osteoarthritis.   2.  Demineralization.   3. Joint effusion.      XR R knee:  Normal examination.     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, improved   - Side effects with both methotrexate and leflunomide  - Remains on hydroxychloroquine 400 mg daily  - She was seen by ophthalmology 3/2025 and no retinal toxicity   - She also takes Tylenol arthritis and naproxen as needed  - Pain and swelling and stiffness in her hands overall improved  - Reviewed blood work with patient today, she will continue to get blood work every 3-4    B/L knee pain 2/2 OA  - X-ray of left knee did show evidence of mild osteoarthritis and joint effusion  - s/p cortisone injection July 2021 and April 2024, 10/2024  - Knee fluid was noninflammatory back in October 2024  - Bilateral knees injected with 1 cc of lidocaine and 40 mg of Kenalog in sterile fashion.  Patient tolerated procedure well.  Left knee also aspirated approximately 10 cc of synovial fluid was removed    R hand carpal tunnel release  - EMG done consistent with moderate carpal tunnel in her right hand  - She has tried prednisone, naproxen and Tylenol with minimal relief  - s/p Right carpal tunnel sheath injection  - She had carpal tunnel release surgery in December 2021    Recent breast biopsy  - This was benign, biopsy showed residual papilloma    Pt will f/u 3-4 mos     There is a longitudinal care relationship with me, the care plan reflects the ongoing nature of the continuous relationship of care, and the medical record indicates that there is ongoing treatment of a serious/complex medical condition which I am currently managing.  is Applicable.     Sandi Bueno MD  4/30/2025  9:40 AM

## 2025-05-02 RX ORDER — NAPROXEN 500 MG/1
500 TABLET ORAL 2 TIMES DAILY WITH MEALS
Qty: 60 TABLET | Refills: 2 | OUTPATIENT
Start: 2025-05-02

## 2025-05-06 ENCOUNTER — HOSPITAL ENCOUNTER (OUTPATIENT)
Dept: MAMMOGRAPHY | Facility: HOSPITAL | Age: 55
Discharge: HOME | End: 2025-05-06
Payer: COMMERCIAL

## 2025-05-06 DIAGNOSIS — R92.8 ABNORMAL MAMMOGRAM: ICD-10-CM

## 2025-05-06 PROCEDURE — 77062 BREAST TOMOSYNTHESIS BI: CPT

## 2025-05-06 PROCEDURE — 77066 DX MAMMO INCL CAD BI: CPT

## 2025-05-12 RX ORDER — PANTOPRAZOLE SODIUM 40 MG/1
40 TABLET, DELAYED RELEASE ORAL DAILY PRN
Qty: 30 TABLET | Refills: 3 | Status: SHIPPED | OUTPATIENT
Start: 2025-05-12

## 2025-05-13 DIAGNOSIS — G47.9 SLEEP DISORDER: ICD-10-CM

## 2025-05-13 NOTE — TELEPHONE ENCOUNTER
04/05/2025  LAST WRITTEN: 10/28/2024  Quantity: 30    Recent Visits  Date Type Provider Dept   04/14/25 Office Visit Wayne Clark DO [facility]   01/23/25 Office Visit Wayne Clark, DO [facility]

## 2025-05-14 RX ORDER — ZOLPIDEM TARTRATE 10 MG/1
10 TABLET ORAL NIGHTLY PRN
Qty: 30 TABLET | Refills: 3 | Status: SHIPPED | OUTPATIENT
Start: 2025-05-14

## 2025-06-24 RX ORDER — AMLODIPINE BESYLATE 5 MG/1
5 TABLET ORAL DAILY
Qty: 30 TABLET | Refills: 11 | OUTPATIENT
Start: 2025-06-24

## 2025-06-24 NOTE — TELEPHONE ENCOUNTER
Disp Refills Start End    amLODIPine 5 MG Oral Tab 30 tablet 11 4/14/2025 --    Sig - Route: Take 1 tablet (5 mg total) by mouth daily. - Oral    Sent to pharmacy as: amLODIPine Besylate 5 MG Oral Tablet (Norvasc)    E-Prescribing Status: Receipt confirmed by pharmacy (4/14/2025  5:48 PM CDT)      Pharmacy    [id number] - [facility] [address] [place] [phone], [phone]

## 2025-07-02 RX ORDER — NAPROXEN 500 MG/1
500 TABLET ORAL 2 TIMES DAILY WITH MEALS
Qty: 60 TABLET | Refills: 0 | Status: SHIPPED | OUTPATIENT
Start: 2025-07-02

## 2025-07-06 ENCOUNTER — PATIENT MESSAGE (OUTPATIENT)
Age: 55
End: 2025-07-06

## 2025-07-07 RX ORDER — HYDROXYCHLOROQUINE SULFATE 200 MG/1
400 TABLET, FILM COATED ORAL DAILY
COMMUNITY
Start: 2025-06-01 | End: 2025-07-07

## 2025-07-07 NOTE — TELEPHONE ENCOUNTER
Please advise.    Requested Prescriptions     Pending Prescriptions Disp Refills    hydroxychloroquine 200 MG Oral Tab 180 tablet 0     Sig: Take 2 tablets (400 mg total) by mouth daily.     LOV: 4/30/25  Future Appointments   Date Time Provider Department Center   9/23/2025  5:20 PM Sandi Bueno MD [facility]     Labs:     Component      Latest Ref Rng 4/25/2025   WBC      4.0 - 11.0 x10(3) uL 4.3    RBC      3.80 - 5.30 x10(6)uL 3.58 (L)    Hemoglobin      12.0 - 16.0 g/dL 11.2 (L)    Hematocrit      35.0 - 48.0 % 34.7 (L)    MCV      80.0 - 100.0 fL 96.9    MCH      26.0 - 34.0 pg 31.3    MCHC      31.0 - 37.0 g/dL 32.3    RDW-SD      35.1 - 46.3 fL 44.8    RDW      11.0 - 15.0 % 12.6    Platelet Count      150.0 - 450.0 10(3)uL 196.0    Prelim Neutrophil Abs      1.50 - 7.70 x10 (3) uL 3.04    Neutrophils Absolute      1.50 - 7.70 x10(3) uL 3.04    Lymphocytes Absolute      1.00 - 4.00 x10(3) uL 0.86 (L)    Monocytes Absolute      0.10 - 1.00 x10(3) uL 0.34    Eosinophils Absolute      0.00 - 0.70 x10(3) uL 0.07    Basophils Absolute      0.00 - 0.20 x10(3) uL 0.01    Immature Granulocyte Absolute      0.00 - 1.00 x10(3) uL 0.01    Neutrophils %      % 70.2    Lymphocytes %      % 19.9    Monocytes %      % 7.9    Eosinophils %      % 1.6    Basophils %      % 0.2    Immature Granulocyte %      % 0.2    CREATININE      0.55 - 1.02 mg/dL 0.82    EGFR      >=60 mL/min/1.73m2 86    Albumin      3.2 - 4.8 g/dL 4.4    ALT (SGPT)      10 - 49 U/L 14    AST (SGOT)      <34 U/L 26    C-REACTIVE PROTEIN      <1.00 mg/dL <0.40    SED RATE      0 - 30 mm/Hr 43 (H)       Legend:  (L) Low  (H) High  Instructions    You were seen today for mixed connective tissue disease  Joints overall are better  Continue hydroxychloroquine  Continue Tylenol and naproxen as needed  We could always add azathioprine if you continue to have a lot of pain and stiffness  I injected both knees with cortisone  Make a follow-up in 3 to  4 months  Make sure to get blood work again in 3 to 4-month

## 2025-07-08 RX ORDER — HYDROXYCHLOROQUINE SULFATE 200 MG/1
400 TABLET, FILM COATED ORAL DAILY
Qty: 180 TABLET | Refills: 0 | Status: SHIPPED | OUTPATIENT
Start: 2025-07-08

## 2025-07-15 NOTE — TELEPHONE ENCOUNTER
FMLA and HIPAA logged. [Fatigue] : fatigue [Diarrhea: Grade 0] : Diarrhea: Grade 0 [Negative] : Allergic/Immunologic

## 2025-08-27 DIAGNOSIS — E11.9 CONTROLLED TYPE 2 DIABETES MELLITUS WITHOUT COMPLICATION, WITHOUT LONG-TERM CURRENT USE OF INSULIN (HCC): ICD-10-CM

## 2025-08-27 RX ORDER — TIRZEPATIDE 5 MG/.5ML
5 INJECTION, SOLUTION SUBCUTANEOUS WEEKLY
Qty: 6 ML | Refills: 1 | Status: SHIPPED | OUTPATIENT
Start: 2025-08-27

## (undated) DIAGNOSIS — R73.9 HYPERGLYCEMIA: Primary | ICD-10-CM

## (undated) DIAGNOSIS — L50.1 CHRONIC IDIOPATHIC URTICARIA: Primary | ICD-10-CM

## (undated) DIAGNOSIS — Z02.9 ENCOUNTERS FOR UNSPECIFIED ADMINISTRATIVE PURPOSE: ICD-10-CM

## (undated) DIAGNOSIS — R73.9 HYPERGLYCEMIA: ICD-10-CM

## (undated) DEVICE — SYRINGE MED 12ML STD LUERLOCK NDL BOLD GRAD

## (undated) DEVICE — MINOR GENERAL: Brand: MEDLINE INDUSTRIES, INC.

## (undated) DEVICE — SUTURE ETHILON 4-0 699G

## (undated) DEVICE — BANDAGE ROLL,100% COTTON, 6 PLY, SMALL: Brand: KERLIX

## (undated) DEVICE — DISPOSABLE TOURNIQUET CUFF SINGLE BLADDER, DUAL PORT AND QUICK CONNECT CONNECTOR: Brand: COLOR CUFF

## (undated) DEVICE — DRAPE PACK CHEST

## (undated) DEVICE — ECTRA II PROCEDURE KIT,                                    SINGLE-USE, DISPOSABLE. CONTENTS                                    PROBE KNIFE, RETROGRADE KNIFE,                                    TRIANGLE KNIFE, HAND PAD, SWABS: Brand: ECTRA

## (undated) DEVICE — DERMABOND LIQUID ADHESIVE

## (undated) DEVICE — GAMMEX® PI HYBRID SIZE 7, STERILE POWDER-FREE SURGICAL GLOVE, POLYISOPRENE AND NEOPRENE BLEND: Brand: GAMMEX

## (undated) DEVICE — INTENDED FOR TISSUE SEPARATION, AND OTHER PROCEDURES THAT REQUIRE A SHARP SURGICAL BLADE TO PUNCTURE OR CUT.: Brand: BARD-PARKER ® STAINLESS STEEL BLADES

## (undated) DEVICE — GAMMEX® PI HYBRID SIZE 7.5, STERILE POWDER-FREE SURGICAL GLOVE, POLYISOPRENE AND NEOPRENE BLEND: Brand: GAMMEX

## (undated) DEVICE — PACK CDS MINOR GENERAL

## (undated) DEVICE — SOL  .9 1000ML BTL

## (undated) DEVICE — DRAPE SHEET LAPAROTOMY

## (undated) DEVICE — GOWN SURG AERO BLUE PERF LG

## (undated) DEVICE — CAUTERY BLADE 2IN INS E1455

## (undated) DEVICE — HANDLE SUCT REG CAP FLANGETIP SMOOTH YANK

## (undated) DEVICE — GLOVE GAMMEX PI HYBRID LF 6.5

## (undated) DEVICE — SUTURE COAT VCRL 3-0 27IN ABSRB UD 26MM SH

## (undated) DEVICE — CLIP SUR SM TI HRT SHP WIRE HORZ LIG SYS

## (undated) DEVICE — UNDYED BRAIDED (POLYGLACTIN 910), SYNTHETIC ABSORBABLE SUTURE: Brand: COATED VICRYL

## (undated) DEVICE — STERILE TETRA-FLEX CF, ELASTIC BANDAGE, 4" X 5.5YD: Brand: TETRA-FLEX™CF

## (undated) DEVICE — CLIP LIG M BLU TI HRT SHP WIRE HORZ

## (undated) DEVICE — SUTURE MCRYL 4-0 18IN ABSRB UD 19MM PS-2 3/8

## (undated) DEVICE — TOWEL SURG OR 17X30IN BLUE

## (undated) DEVICE — SUTURE VICRYL 3-0 SH

## (undated) DEVICE — PAD ABD W7.5XL8IN SFT NWVN OTR LAYR

## (undated) DEVICE — BRA SURG ELIZ PINK M

## (undated) DEVICE — SOLUTION IRRIG 1000ML 0.9% NACL USP BTL

## (undated) DEVICE — SUTURE PERMA- 2-0 18IN NABSRB BLK 26MM FS

## (undated) DEVICE — NEEDLE HPO 22GA 1.5IN EDG

## (undated) DEVICE — SUTURE PDS II 3-0 L18IN ABSRB UD L24MM PS-1

## (undated) DEVICE — PLASTIC HAND: Brand: MEDLINE INDUSTRIES, INC.

## (undated) DEVICE — TAPE DRAPE SRG 18X4IN STRL LF

## (undated) DEVICE — ADHESIVE LIQ 2/3ML VI MASTISOL

## (undated) NOTE — LETTER
[facility]  [address], [place], [place]  Authorization for Surgical Operation and Procedure                                                                                           I hereby authorize                                      , my physician and his/her assistants (if applicable), which may include medical students, residents, and/or fellows, to perform the following surgical operation/ procedure and administer such anesthesia as may be determined necessary by my physician: Operation/Procedure name (s) Left breast wire localization on Paula Todd   2.   I recognize that during the surgical operation/procedure, unforeseen conditions may necessitate additional or different procedures than those listed above.  I, therefore, further authorize and request that the above-named surgeon, assistants, or designees perform such procedures as are, in their judgment, necessary and desirable.    3.   My surgeon/physician has discussed prior to my surgery the potential benefits, risks and side effects of this procedure; the likelihood of achieving goals; and potential problems that might occur during recuperation.  They also discussed reasonable alternatives to the procedure, including risks, benefits, and side effects related to the alternatives and risks related to not receiving this procedure.  I have had all my questions answered and I acknowledge that no guarantee has been made as to the result that may be obtained.    4.   Should the need arise during my operation/procedure, which includes change of level of care prior to discharge, I also consent to the administration of blood and/or blood products.  Further, I understand that despite careful testing and screening of blood or blood products by collecting agencies, I may still be subject to ill effects as a result of receiving a blood transfusion and/or blood products.  The following are some, but not all, of the potential risks that can  occur: fever and allergic reactions, hemolytic reactions, transmission of diseases such as Hepatitis, AIDS and Cytomegalovirus (CMV) and fluid overload.  In the event that I wish to have an autologous transfusion of my own blood, or a directed donor transfusion, I will discuss this with my physician.  Check only if Refusing Blood or Blood Products  I understand refusal of blood or blood products as deemed necessary by my physician may have serious consequences to my condition to include possible death. I hereby assume responsibility for my refusal and release the hospital, its personnel, and my physicians from any responsibility for the consequences of my refusal.    o  Refuse   5.   I authorize the use of any specimen, organs, tissues, body parts or foreign objects that may be removed from my body during the operation/procedure for diagnosis, research or teaching purposes and their subsequent disposal by hospital authorities.  I also authorize the release of specimen test results and/or written reports to my treating physician on the hospital medical staff or other referring or consulting physicians involved in my care, at the discretion of the Pathologist or my treating physician.    6.   I consent to the photographing or videotaping of the operations or procedures to be performed, including appropriate portions of my body for medical, scientific, or educational purposes, provided my identity is not revealed by the pictures or by descriptive texts accompanying them.  If the procedure has been photographed/videotaped, the surgeon will obtain the original picture, image, videotape or CD.  The hospital will not be responsible for storage, release or maintenance of the picture, image, tape or CD.    7.   I consent to the presence of a  or observers in the operating room as deemed necessary by my physician or their designees.    8.   I recognize that in the event my procedure results in extended  X-Ray/fluoroscopy time, I may develop a skin reaction.    9. If I have a Do Not Attempt Resuscitation (DNAR) order in place, that status will be suspended while in the operating room, procedural suite, and during the recovery period unless otherwise explicitly stated by me (or a person authorized to consent on my behalf). The surgeon or my attending physician will determine when the applicable recovery period ends for purposes of reinstating the DNAR order.  10. Patients having a sterilization procedure: I understand that if the procedure is successful the results will be permanent and it will therefore be impossible for me to inseminate, conceive, or bear children.  I also understand that the procedure is intended to result in sterility, although the result has not been guaranteed.   11. I acknowledge that my physician has explained sedation/analgesia administration to me including the risk and benefits I consent to the administration of sedation/analgesia as may be necessary or desirable in the judgment of my physician.    I CERTIFY THAT I HAVE READ AND FULLY UNDERSTAND THE ABOVE CONSENT TO OPERATION and/or OTHER PROCEDURE.     _________________________________________ _________________________________     ___________________________________  Signature of Patient     Signature of Responsible Person                   Printed Name of Responsible Person                              _________________________________________ ______________________________        ___________________________________  Signature of Witness         Date  Time         Relationship to Patient    STATEMENT OF PHYSICIAN My signature below affirms that prior to the time of the procedure; I have explained to the patient and/or his/her legal representative, the risks and benefits involved in the proposed treatment and any reasonable alternative to the proposed treatment. I have also explained the risks and benefits involved in refusal of the  proposed treatment and alternatives to the proposed treatment and have answered the patient's questions. If I have a significant financial interest in a co-management agreement or a significant financial interest in any product or implant, or other significant relationship used in this procedure/surgery, I have disclosed this and had a discussion with my patient.     _______________________________________________________________ _____________________________  (Signature of Physician)                                                                                         (Date)                                   (Time)  Patient Name: Paula Brooks    : 1970   Printed: 2024      Medical Record #: [id number]                                              Page 1 of 1

## (undated) NOTE — LETTER
21      Patient: Bossman Bernal  : 1970 Visit date: 2021    Dear Stella Massey,      I examined your patient in consultation today.     She has a right carpal tunnel syndrome, which has not responded to injection and anti-infla

## (undated) NOTE — LETTER
[facility]  [address] [address]  PH: [phone]  FAX: [phone]        24  Paula Brooks, :  1970  [address] [address]      This is the [facility] office of Dr. Silvino Clark.    Thank you for putting your trust in [facility].  Our goal is to deliver the highest quality healthcare and an exceptional patient experience. Review of your medical record shows you are due for the following:        Colonoscopy  Blood pressure follow up   Diabetic Eye Exam       Please call [phone] to schedule your appointment or schedule online via [employer].     If you changed to a new provider at another facility, please notify the clinic to update your records.    If you had any recent testing at another facility, please have your results faxed to our office at [phone].     Thank you and have a great day!

## (undated) NOTE — MR AVS SNAPSHOT
[place]  [address]  [phone]               Thank you for choosing us for your health care visit with Olimpia Alfonso MD.  We are glad to serve you and happy to provide you with this summar may be held responsible for payment in full if proper authorization is not acquired. Please contact the Patient Business Office at [phone] if you have any questions related to insurance coverage.                Reason for Today's Visit     Gyn Exam

## (undated) NOTE — LETTER
Date & Time: 12/7/2024, 8:34 AM  Patient: Paula Brooks  Encounter Provider(s):    Kiley Braswell MD       To Whom It May Concern:    Paula Brooks was seen and treated in our department on 12/7/2024. She can return to work.    If you have any questions or concerns, please do not hesitate to call.        _____________________________  Physician/APC Signature

## (undated) NOTE — LETTER
9/25/2020          To Whom It May Concern:    Sol Espinoza is currently under my medical care and may return to work on 9/25/2020. Please excuse Kayla Nahtan from September 22 until September 24, 2020. She may return to work on September 25, 2020. Activity is restricted as follows: Light duty until seen for her post operative visit. If you require additional information please contact our office.         Sincerely,    Bety Koehler MD

## (undated) NOTE — LETTER
[facility]  [address], [place], [place]  Authorization for Surgical Operation and Procedure                                                                                           I hereby authorize Sahra Dunn MD, my physician and his/her assistants (if applicable), which may include medical students, residents, and/or fellows, to perform the following surgical operation/ procedure and administer such anesthesia as may be determined necessary by my physician: Operation/Procedure name (s) Left breast excisional biopsy on Paula Brooks   2.   I recognize that during the surgical operation/procedure, unforeseen conditions may necessitate additional or different procedures than those listed above.  I, therefore, further authorize and request that the above-named surgeon, assistants, or designees perform such procedures as are, in their judgment, necessary and desirable.    3.   My surgeon/physician has discussed prior to my surgery the potential benefits, risks and side effects of this procedure; the likelihood of achieving goals; and potential problems that might occur during recuperation.  They also discussed reasonable alternatives to the procedure, including risks, benefits, and side effects related to the alternatives and risks related to not receiving this procedure.  I have had all my questions answered and I acknowledge that no guarantee has been made as to the result that may be obtained.    4.   Should the need arise during my operation/procedure, which includes change of level of care prior to discharge, I also consent to the administration of blood and/or blood products.  Further, I understand that despite careful testing and screening of blood or blood products by collecting agencies, I may still be subject to ill effects as a result of receiving a blood transfusion and/or blood products.  The following are some, but not all, of the potential risks that can occur: fever  and allergic reactions, hemolytic reactions, transmission of diseases such as Hepatitis, AIDS and Cytomegalovirus (CMV) and fluid overload.  In the event that I wish to have an autologous transfusion of my own blood, or a directed donor transfusion, I will discuss this with my physician.  Check only if Refusing Blood or Blood Products  I understand refusal of blood or blood products as deemed necessary by my physician may have serious consequences to my condition to include possible death. I hereby assume responsibility for my refusal and release the hospital, its personnel, and my physicians from any responsibility for the consequences of my refusal.    o  Refuse   5.   I authorize the use of any specimen, organs, tissues, body parts or foreign objects that may be removed from my body during the operation/procedure for diagnosis, research or teaching purposes and their subsequent disposal by hospital authorities.  I also authorize the release of specimen test results and/or written reports to my treating physician on the hospital medical staff or other referring or consulting physicians involved in my care, at the discretion of the Pathologist or my treating physician.    6.   I consent to the photographing or videotaping of the operations or procedures to be performed, including appropriate portions of my body for medical, scientific, or educational purposes, provided my identity is not revealed by the pictures or by descriptive texts accompanying them.  If the procedure has been photographed/videotaped, the surgeon will obtain the original picture, image, videotape or CD.  The hospital will not be responsible for storage, release or maintenance of the picture, image, tape or CD.    7.   I consent to the presence of a  or observers in the operating room as deemed necessary by my physician or their designees.    8.   I recognize that in the event my procedure results in extended X-Ray/fluoroscopy  time, I may develop a skin reaction.    9. If I have a Do Not Attempt Resuscitation (DNAR) order in place, that status will be suspended while in the operating room, procedural suite, and during the recovery period unless otherwise explicitly stated by me (or a person authorized to consent on my behalf). The surgeon or my attending physician will determine when the applicable recovery period ends for purposes of reinstating the DNAR order.  10. Patients having a sterilization procedure: I understand that if the procedure is successful the results will be permanent and it will therefore be impossible for me to inseminate, conceive, or bear children.  I also understand that the procedure is intended to result in sterility, although the result has not been guaranteed.   11. I acknowledge that my physician has explained sedation/analgesia administration to me including the risk and benefits I consent to the administration of sedation/analgesia as may be necessary or desirable in the judgment of my physician.    I CERTIFY THAT I HAVE READ AND FULLY UNDERSTAND THE ABOVE CONSENT TO OPERATION and/or OTHER PROCEDURE.     _________________________________________ _________________________________     ___________________________________  Signature of Patient     Signature of Responsible Person                   Printed Name of Responsible Person                              _________________________________________ ______________________________        ___________________________________  Signature of Witness         Date  Time         Relationship to Patient    STATEMENT OF PHYSICIAN My signature below affirms that prior to the time of the procedure; I have explained to the patient and/or his/her legal representative, the risks and benefits involved in the proposed treatment and any reasonable alternative to the proposed treatment. I have also explained the risks and benefits involved in refusal of the proposed treatment and  alternatives to the proposed treatment and have answered the patient's questions. If I have a significant financial interest in a co-management agreement or a significant financial interest in any product or implant, or other significant relationship used in this procedure/surgery, I have disclosed this and had a discussion with my patient.     _______________________________________________________________ _____________________________  (Signature of Physician)                                                                                         (Date)                                   (Time)  Patient Name: Paula Brooks    : 1970   Printed: 2024      Medical Record #: [id number]                                              Page 1 of 1

## (undated) NOTE — LETTER
[address] [address] [address]  [address] [address]  [phone]  Authorization for Imaging Procedure    I hereby authorize VERONIQUE Khan MD, my physician and his/her assistants (if applicable), which may include medical students, residents, and/or fellows, to perform the following procedure and administer such anesthesia as may be determined necessary by my physician: STEREOTACTIC GUIDED BIOPSY WITH TOMOSYNTHESIS OF THE LEFT BREAST WITH CLIP PLACEMENT on Maggie Balint. 2.  I recognize that during the procedure, unforeseen conditions may necessitate additional or different procedures than those listed above. I, therefore, further authorize and request that the above-named physician, assistants, or designees perform such procedures as are, in their judgment, necessary and desirable. 3.  My physician has discussed prior to my procedure the potential benefits, risks and side effects of this procedure; the likelihood of achieving goals; and potential problems that might occur during recuperation. They also discussed reasonable alternatives to the procedure, including risks, benefits, and side effects related to the alternatives and risks related to not receiving this procedure. I have had all my questions answered and I acknowledge that no guarantee has been made as to the result that may be obtained. 4.  Should the need arise during my procedure, which includes change of level of care prior to discharge, I also consent to the administration of blood and/or blood products. Further, I understand that despite careful testing and screening of blood or blood products by collecting agencies, I may still be subject to ill effects as a result of receiving a blood transfusion and/or blood products.  The following are some, but not all, of the potential risks that can occur: fever and allergic reactions, hemolytic reactions, transmission of diseases such as Hepatitis, AIDS and Cytomegalovirus (CMV) and fluid overload. In the event that I wish to have an autologous transfusion of my own blood, or a directed donor transfusion, I will discuss this with my physician. Check only if Refusing Blood or Blood Products  I understand refusal of blood or blood products as deemed necessary by my physician may have serious consequences to my condition to include possible death. I hereby assume responsibility for my refusal and release the hospital, its personnel, and my physicians from any responsibility for the consequences of my refusal.   [  ] Patient Refuses Blood      5. I authorize the use of any specimen, organs, tissues, body parts or foreign objects that may be removed from my body during the procedure for diagnosis, research or teaching purposes and their subsequent disposal by hospital authorities. I also authorize the release of specimen test results and/or written reports to my treating physician on the hospital medical staff or other referring or consulting physicians involved in my care, at the discretion of the Pathologist or my treating physician. 6.  I consent to the photographing or videotaping of the procedures to be performed, including appropriate portions of my body for medical, scientific, or educational purposes, provided my identity is not revealed by the pictures or by descriptive texts accompanying them. If the procedure has been photographed/videotaped, the physician will obtain the original picture, image, videotape or CD. The hospital will not be responsible for storage, release or maintenance of the picture, image, tape or CD.   7.  I consent to the presence of a  or observers in the operating room as deemed necessary by my physician or their designees. 8.  I recognize that in the event my procedure results in extended X-Ray/fluoroscopy time, I may develop a skin reaction. 9.   If I have a Do Not Attempt Resuscitation (DNAR) order in place, that status will be suspended while in the operating room, procedural suite, and during the recovery period unless otherwise explicitly stated by me (or a person authorized to consent on my behalf). The performing physician or my attending physician will determine when the applicable recovery period ends for purposes of reinstating the DNAR order. 10.  I acknowledge that my physician has explained sedation/analgesia administration to me including the risk and benefits I consent to the administration of sedation/analgesia as may be necessary or desirable in the judgment of my physician. I CERTIFY THAT I HAVE READ AND FULLY UNDERSTAND THE ABOVE CONSENT FOR THE PROCEDURE. Signature of Patient: _____________________________________________________________  Responsible person in case of minor, unconscious: ____________________________________  Relationship to patient:  __________________________________________________________  Signature of Witness: _______________________________Date: _________Time: __________    Statement of Physician: My signature below affirms that prior to the time of the procedure, I have explained to the patient and/or her guardian, the risks and benefits involved in the proposed treatment and any reasonable alternative to the proposed treatment. I have also explained the risks and benefits involved in the refusal of the proposed treatment and have answered the patient's questions. If I have a significant financial interest in a co-management agreement or a significant financial interest in any product or implant, or other significant relationship used in the procedure/surgery, I have disclosed this and had a discussion with my patient.   Signature of Physician:   _________________________________Date:_____________Time:________    Patient Name: Brandi Tiradocharlie : 1970  Printed: 2023   Medical Record #: [id number]

## (undated) NOTE — MR AVS SNAPSHOT
[facility] SPECIALTY [facility] - [address] [address] [address] [address] [phone]               Thank you for choosing us for your health care visit with Siena Pink DO.   We are glad to serve you and happy to provide you with this summary of Assoc Dx:  Routine general medical examination at a health care facility [Z00.00]           TSH [E]    Complete by:   Feb 03, 2017 (Approximate)    Assoc Dx:  Routine general medical examination at a health care facility [Z00.00]                 Dl Enjoy your food, but eat less. Fully enjoy your food when eating. Don’t eat while distracted and slow down. Avoid over sized portions. Don’t eat while when you’re bored.      EAT THESE FOODS MORE OFTEN: EAT THESE FOODS LESS OFTEN:   Make half your pl

## (undated) NOTE — LETTER
10/25/2023              [address] [place] [address],     Our records indicate you are due for the following:      Mammogram   Colonoscopy or FIT KIT (if getting Fit kit, call clinic to notify to have Fit kit ready for )  Diabetic A1C follow up   Diabetic Eye Exam   Diabetic Foot Exam   Blood Pressure follow up   (Referrals for testing will be ordered at time of physical visit)     If changed to a new doctor at another facility please notify the clinic to update in system. Please call [phone] to schedule your appointment or schedule a physical through [employer]. It's very important to have your routine check ups and testing for the care of your health. If you had any recent testing at another facility, please call to have their office fax results to [phone]. Thank you have a great day.      Sincerely,     Williams Chin, [address] ,    [facility], [place], [address]  [facility] [address] [phone]

## (undated) NOTE — Clinical Note
TCM outreach completed. Sent TE to PCP office as only res24 slots are open when pt can come in for appt. Thank you!

## (undated) NOTE — MR AVS SNAPSHOT
[facility] SPECIALTY [facility] - [address] [place] [address] [address] [address] [phone]               Thank you for choosing us for your health care visit with Antoni Snider MD.  We are glad to serve you and happy to provide you with this summary o Commonly known as:  XYZAL           Montelukast Sodium 10 MG Tabs   Take 1 tablet (10 mg total) by mouth nightly. Commonly known as:  SINGULAIR           Olopatadine HCl 0.7 % Soln   Apply 1 drop to eye daily.    Commonly known as:  Laisha Flemingmer

## (undated) NOTE — LETTER
[address]  [facility] [address] [address]  [phone]  Authorization for Imaging Procedure    I hereby authorize Dr. Tonja Smith, my physician and his/her assistants (if applicable), which may include medical students, residents, and/or fellows, to perform the following procedure and administer such anesthesia as may be determined necessary by my physician: ULTRASOUND GUIDED LEFT BREAST BIOPSY WITH CLIP PLACEMENT  on Panfilo Embs. 2.  I recognize that during the procedure, unforeseen conditions may necessitate additional or different procedures than those listed above. I, therefore, further authorize and request that the above-named physician, assistants, or designees perform such procedures as are, in their judgment, necessary and desirable. 3.  My physician has discussed prior to my procedure the potential benefits, risks and side effects of this procedure; the likelihood of achieving goals; and potential problems that might occur during recuperation. They also discussed reasonable alternatives to the procedure, including risks, benefits, and side effects related to the alternatives and risks related to not receiving this procedure. I have had all my questions answered and I acknowledge that no guarantee has been made as to the result that may be obtained. 4.  Should the need arise during my procedure, which includes change of level of care prior to discharge, I also consent to the administration of blood and/or blood products. Further, I understand that despite careful testing and screening of blood or blood products by collecting agencies, I may still be subject to ill effects as a result of receiving a blood transfusion and/or blood products.  The following are some, but not all, of the potential risks that can occur: fever and allergic reactions, hemolytic reactions, transmission of diseases such as Hepatitis, AIDS and Cytomegalovirus (CMV) and fluid overload. In the event that I wish to have an autologous transfusion of my own blood, or a directed donor transfusion, I will discuss this with my physician. Check only if Refusing Blood or Blood Products  I understand refusal of blood or blood products as deemed necessary by my physician may have serious consequences to my condition to include possible death. I hereby assume responsibility for my refusal and release the hospital, its personnel, and my physicians from any responsibility for the consequences of my refusal.   [  ] Patient Refuses Blood      5. I authorize the use of any specimen, organs, tissues, body parts or foreign objects that may be removed from my body during the procedure for diagnosis, research or teaching purposes and their subsequent disposal by hospital authorities. I also authorize the release of specimen test results and/or written reports to my treating physician on the hospital medical staff or other referring or consulting physicians involved in my care, at the discretion of the Pathologist or my treating physician. 6.  I consent to the photographing or videotaping of the procedures to be performed, including appropriate portions of my body for medical, scientific, or educational purposes, provided my identity is not revealed by the pictures or by descriptive texts accompanying them. If the procedure has been photographed/videotaped, the physician will obtain the original picture, image, videotape or CD. The hospital will not be responsible for storage, release or maintenance of the picture, image, tape or CD.   7.  I consent to the presence of a  or observers in the operating room as deemed necessary by my physician or their designees. 8.  I recognize that in the event my procedure results in extended X-Ray/fluoroscopy time, I may develop a skin reaction. 9.   If I have a Do Not Attempt Resuscitation (DNAR) order in place, that status will be suspended while in the operating room, procedural suite, and during the recovery period unless otherwise explicitly stated by me (or a person authorized to consent on my behalf). The performing physician or my attending physician will determine when the applicable recovery period ends for purposes of reinstating the DNAR order. 10.  I acknowledge that my physician has explained sedation/analgesia administration to me including the risk and benefits I consent to the administration of sedation/analgesia as may be necessary or desirable in the judgment of my physician. I CERTIFY THAT I HAVE READ AND FULLY UNDERSTAND THE ABOVE CONSENT FOR THE PROCEDURE. Signature of Patient: _____________________________________________________________  Responsible person in case of minor, unconscious: ____________________________________  Relationship to patient:  __________________________________________________________  Signature of Witness: _______________________________Date: _________Time: __________    Statement of Physician: My signature below affirms that prior to the time of the procedure, I have explained to the patient and/or her guardian, the risks and benefits involved in the proposed treatment and any reasonable alternative to the proposed treatment. I have also explained the risks and benefits involved in the refusal of the proposed treatment and have answered the patient's questions. If I have a significant financial interest in a co-management agreement or a significant financial interest in any product or implant, or other significant relationship used in the procedure/surgery, I have disclosed this and had a discussion with my patient.   Signature of Physician:   _________________________________Date:_____________Time:________    Patient Name: Sol Olga : 1970  Printed: 2023   Medical Record #: [id number]

## (undated) NOTE — MR AVS SNAPSHOT
After Visit Summary   9/30/2019    Terence Gomez    MRN: [id number]           Visit Information     Date & Time  9/30/2019  1:00 PM Provider  Kedar Hicks MD [address] [place] [address], [facility]/[employer].  Phone  [phone] [facility] MIQUEL 2D+3D SCREENING BILAT (CPT=77067/73868) [COMBO CPT(R)]  9/30/2019 9/30/2020    T PALLIDUM SCREENING CASCADE [3552995 CPT(R)]  9/30/2019 (Approximate) 9/29/2020    THINPREP PAP SMEAR B [[id number] CUSTOM]  9/30/2019 9/30/2020    THINPREP PAP SMEAR ON Eat plenty of low-fat dairy products High fat meats and dairy   Choose whole grain products Foods high in sodium   Water is best for hydration Fast food.    Eat at home when possible     Tips for increasing your physical activity – Adults who are physically Visit face-to-face with a [facility] physician or [facility]  using your mobile device or computer   using [employer]      e-VISITS  Communicate with a [facility] physician or [facility]  online. The physician will respond and provide a treatment plan within a few hours.            [place]

## (undated) NOTE — LETTER
9/25/2018              Milvia Young        [address] [facility], [place] [id number] [facility] [address]         Dear Isaac Christie,    It was a pleasure to see you. Your mammogram was normal. Next in one year. Encouraged to do monthly self breast exams.   There is no

## (undated) NOTE — LETTER
Date & Time: 12/7/2024, 8:34 AM  Patient: Paula Brooks  Encounter Provider(s):    Kiley Braswell MD       To Whom It May Concern:    Paula Brooks was seen and treated in our department on 12/7/2024. She should not return to work until 12/9/2024 .    If you have any questions or concerns, please do not hesitate to call.        _____________________________  Physician/[facility] Signature

## (undated) NOTE — LETTER
22      Patient: Panfilo Grey  : 1970 Visit date: 2022    Dear Giorgio Molina,      I examined your patient in follow-up today. She is 1 month post right endoscopic carpal tunnel release.     Her preoperative symptoms are go